# Patient Record
Sex: FEMALE | Race: WHITE | NOT HISPANIC OR LATINO | Employment: FULL TIME | URBAN - METROPOLITAN AREA
[De-identification: names, ages, dates, MRNs, and addresses within clinical notes are randomized per-mention and may not be internally consistent; named-entity substitution may affect disease eponyms.]

---

## 2017-01-29 ENCOUNTER — COMMUNICATION - HEALTHEAST (OUTPATIENT)
Dept: FAMILY MEDICINE | Facility: CLINIC | Age: 27
End: 2017-01-29

## 2017-01-29 DIAGNOSIS — G89.29 CHRONIC BACK PAIN: ICD-10-CM

## 2017-01-29 DIAGNOSIS — M54.9 CHRONIC BACK PAIN: ICD-10-CM

## 2017-02-03 ENCOUNTER — COMMUNICATION - HEALTHEAST (OUTPATIENT)
Dept: FAMILY MEDICINE | Facility: CLINIC | Age: 27
End: 2017-02-03

## 2017-02-13 ENCOUNTER — COMMUNICATION - HEALTHEAST (OUTPATIENT)
Dept: FAMILY MEDICINE | Facility: CLINIC | Age: 27
End: 2017-02-13

## 2017-02-17 ENCOUNTER — RECORDS - HEALTHEAST (OUTPATIENT)
Dept: GENERAL RADIOLOGY | Facility: CLINIC | Age: 27
End: 2017-02-17

## 2017-02-17 ENCOUNTER — OFFICE VISIT - HEALTHEAST (OUTPATIENT)
Dept: FAMILY MEDICINE | Facility: CLINIC | Age: 27
End: 2017-02-17

## 2017-02-17 DIAGNOSIS — M41.9 SCOLIOSIS OF THORACOLUMBAR SPINE: ICD-10-CM

## 2017-02-17 DIAGNOSIS — M54.9 BACK PAIN: ICD-10-CM

## 2017-02-17 DIAGNOSIS — M54.9 DORSALGIA, UNSPECIFIED: ICD-10-CM

## 2017-02-21 ENCOUNTER — OFFICE VISIT - HEALTHEAST (OUTPATIENT)
Dept: FAMILY MEDICINE | Facility: CLINIC | Age: 27
End: 2017-02-21

## 2017-02-21 DIAGNOSIS — R41.840 ATTENTION AND CONCENTRATION DEFICIT: ICD-10-CM

## 2017-02-21 DIAGNOSIS — F33.0 MILD EPISODE OF RECURRENT MAJOR DEPRESSIVE DISORDER (H): ICD-10-CM

## 2017-02-21 ASSESSMENT — MIFFLIN-ST. JEOR: SCORE: 1200.55

## 2017-02-21 NOTE — ASSESSMENT & PLAN NOTE
She tells me that Celexa, Lexapro and Cymbalta have all not worked for her.  I will order a psychology and psychiatry consultation since she has had some much trouble with medications.  She also has some attention issues for which they can evaluate her too.

## 2017-02-22 ENCOUNTER — COMMUNICATION - HEALTHEAST (OUTPATIENT)
Dept: FAMILY MEDICINE | Facility: CLINIC | Age: 27
End: 2017-02-22

## 2017-02-23 ENCOUNTER — COMMUNICATION - HEALTHEAST (OUTPATIENT)
Dept: FAMILY MEDICINE | Facility: CLINIC | Age: 27
End: 2017-02-23

## 2017-02-23 DIAGNOSIS — G89.29 CHRONIC BACK PAIN: ICD-10-CM

## 2017-02-23 DIAGNOSIS — M54.9 CHRONIC BACK PAIN: ICD-10-CM

## 2017-03-06 ENCOUNTER — COMMUNICATION - HEALTHEAST (OUTPATIENT)
Dept: FAMILY MEDICINE | Facility: CLINIC | Age: 27
End: 2017-03-06

## 2017-03-06 DIAGNOSIS — M54.50 CHRONIC BILATERAL LOW BACK PAIN WITHOUT SCIATICA: ICD-10-CM

## 2017-03-06 DIAGNOSIS — G89.29 CHRONIC BILATERAL LOW BACK PAIN WITHOUT SCIATICA: ICD-10-CM

## 2017-03-20 ENCOUNTER — COMMUNICATION - HEALTHEAST (OUTPATIENT)
Dept: FAMILY MEDICINE | Facility: CLINIC | Age: 27
End: 2017-03-20

## 2017-04-11 ENCOUNTER — COMMUNICATION - HEALTHEAST (OUTPATIENT)
Dept: SCHEDULING | Facility: CLINIC | Age: 27
End: 2017-04-11

## 2018-02-08 ENCOUNTER — OFFICE VISIT - HEALTHEAST (OUTPATIENT)
Dept: FAMILY MEDICINE | Facility: CLINIC | Age: 28
End: 2018-02-08

## 2018-02-08 DIAGNOSIS — F11.90 CHRONIC, CONTINUOUS USE OF OPIOIDS: ICD-10-CM

## 2018-02-08 DIAGNOSIS — M41.9 SCOLIOSIS OF THORACOLUMBAR SPINE, UNSPECIFIED SCOLIOSIS TYPE: ICD-10-CM

## 2018-02-08 RX ORDER — MONTELUKAST SODIUM 10 MG/1
10 TABLET ORAL PRN
Qty: 90 TABLET | Refills: 3 | Status: SHIPPED | OUTPATIENT
Start: 2018-02-08 | End: 2024-03-19

## 2018-02-08 ASSESSMENT — MIFFLIN-ST. JEOR: SCORE: 1156.55

## 2018-02-08 NOTE — ASSESSMENT & PLAN NOTE
With chronic pain.  Patient has had for quite some time.  Notes from when patient used to block the clinic were reviewed and there was no advancement of the scoliosis.  It has been stable.

## 2018-02-08 NOTE — ASSESSMENT & PLAN NOTE
Currently with pain contract Wilfredo Hawkins at [facility].  Discussed with patient that in order for us to establish care as wellas take over the pain contract, will need to get a drug screen prior, as well as retrieve the records from her current PCP for review.  Patient understands and she has been getting medication from her current PCP.  She will complete the test, NEISHA was completed,  was reviewed and noted that patient has been receiving 90 tabs approximately every 30 days from one provider and one pharmacy for the past year.    Also, patient is interested in weaning off and using different modalities for control of the pain.  She has not been to a pain clinic.  Consult was placed as well as packet given to complete to send back to the pain clinic for evaluation.

## 2018-07-11 ENCOUNTER — RECORDS - HEALTHEAST (OUTPATIENT)
Dept: LAB | Facility: CLINIC | Age: 28
End: 2018-07-11

## 2018-07-13 LAB
CODEINE-BY LC-MS/MS: NEGATIVE NG/ML
DIHYDROCODEINE-BY LC-MS/MS: 235 NG/ML
HYDROCODONE-BY LC-MS/MS: 1150 NG/ML
HYDROMORPHONE-BY LC MS/MS: 643 NG/ML
MORPHINE-BY LC MS/MS: NEGATIVE NG/ML
NALOXONE-BY LC-MS/MS: NEGATIVE NG/ML
NORHYDROCODONE-BY LC-MS/MS: 1842 NG/ML
NOROXYCODONE-BY LC-MS/MS: NEGATIVE NG/ML
NOROXYMORPHONE-BY LC-MS/MS: NEGATIVE NG/ML
OXYCODONE-BY LC MS/MS: NEGATIVE NG/ML
OXYMORPHONE-BY LC MS/MS: 32 NG/ML
TOXICOLOGIST REVIEW: NORMAL

## 2018-07-15 LAB
AMPHETAMINE GC/MS CONF: 32 NG/ML
INTERPRETATION: NORMAL
MDA (ECSTASY METABOLITE)-BY GC/MS: NEGATIVE NG/ML
MDMA (ECSTASY)-BY GC/MS: NEGATIVE NG/ML
METHAMPHETAMINE GC/MS CONF: NEGATIVE NG/ML
PHENTERMINE-BY GC/MS: NEGATIVE NG/ML
PSEUDOEPHEDRINE/EPHEDRINE-BY GC/MS: PRESENT NG/ML

## 2018-10-03 ENCOUNTER — AMBULATORY - HEALTHEAST (OUTPATIENT)
Dept: NURSING | Facility: CLINIC | Age: 28
End: 2018-10-03

## 2019-04-25 ENCOUNTER — AMBULATORY - HEALTHEAST (OUTPATIENT)
Dept: PALLIATIVE MEDICINE | Facility: OTHER | Age: 29
End: 2019-04-25

## 2019-04-25 DIAGNOSIS — G89.4 CHRONIC PAIN SYNDROME: ICD-10-CM

## 2019-05-16 ENCOUNTER — OFFICE VISIT - HEALTHEAST (OUTPATIENT)
Dept: FAMILY MEDICINE | Facility: CLINIC | Age: 29
End: 2019-05-16

## 2019-05-16 DIAGNOSIS — N30.00 ACUTE CYSTITIS WITHOUT HEMATURIA: ICD-10-CM

## 2019-05-16 LAB
ALBUMIN UR-MCNC: ABNORMAL MG/DL
APPEARANCE UR: ABNORMAL
BACTERIA #/AREA URNS HPF: ABNORMAL HPF
BILIRUB UR QL STRIP: ABNORMAL
COLOR UR AUTO: YELLOW
GLUCOSE UR STRIP-MCNC: NEGATIVE MG/DL
HGB UR QL STRIP: ABNORMAL
KETONES UR STRIP-MCNC: ABNORMAL MG/DL
LEUKOCYTE ESTERASE UR QL STRIP: ABNORMAL
NITRATE UR QL: NEGATIVE
PH UR STRIP: 6 [PH] (ref 5–8)
RBC #/AREA URNS AUTO: ABNORMAL HPF
SP GR UR STRIP: >1.03 (ref 1–1.03)
SQUAMOUS #/AREA URNS AUTO: ABNORMAL LPF
TRANS CELLS #/AREA URNS HPF: ABNORMAL LPF
UROBILINOGEN UR STRIP-ACNC: ABNORMAL
WBC #/AREA URNS AUTO: ABNORMAL HPF
WBC CLUMPS #/AREA URNS HPF: PRESENT /[HPF]

## 2019-05-16 RX ORDER — DEXTROAMPHETAMINE SACCHARATE, AMPHETAMINE ASPARTATE, DEXTROAMPHETAMINE SULFATE AND AMPHETAMINE SULFATE 2.5; 2.5; 2.5; 2.5 MG/1; MG/1; MG/1; MG/1
10 TABLET ORAL DAILY
Status: SHIPPED | COMMUNITY
Start: 2019-05-16 | End: 2024-03-19

## 2019-05-16 RX ORDER — DEXTROAMPHETAMINE SACCHARATE, AMPHETAMINE ASPARTATE MONOHYDRATE, DEXTROAMPHETAMINE SULFATE AND AMPHETAMINE SULFATE 7.5; 7.5; 7.5; 7.5 MG/1; MG/1; MG/1; MG/1
30 CAPSULE, EXTENDED RELEASE ORAL DAILY
Status: SHIPPED | COMMUNITY
Start: 2019-05-16 | End: 2024-03-19

## 2019-05-16 RX ORDER — METHADONE HYDROCHLORIDE 10 MG/1
100 TABLET ORAL DAILY
Status: SHIPPED | COMMUNITY
Start: 2019-05-16 | End: 2024-03-19

## 2019-05-16 RX ORDER — CITALOPRAM HYDROBROMIDE 10 MG/1
10 TABLET ORAL DAILY
Status: SHIPPED | COMMUNITY
Start: 2019-05-16 | End: 2024-03-19

## 2019-05-16 ASSESSMENT — MIFFLIN-ST. JEOR: SCORE: 1207.36

## 2019-05-17 ENCOUNTER — COMMUNICATION - HEALTHEAST (OUTPATIENT)
Dept: FAMILY MEDICINE | Facility: CLINIC | Age: 29
End: 2019-05-17

## 2019-05-17 LAB — BACTERIA SPEC CULT: NO GROWTH

## 2019-07-26 ENCOUNTER — RECORDS - HEALTHEAST (OUTPATIENT)
Dept: LAB | Facility: CLINIC | Age: 29
End: 2019-07-26

## 2019-07-31 LAB
AMPHET UR-MCNC: >5000 NG/ML
MDA UR-MCNC: <200 NG/ML
MDEA UR-MCNC: <200 NG/ML
MDMA UR-MCNC: <200 NG/ML
METHAMPHET UR-MCNC: <200 NG/ML
PHENTERMINE UR CFM-MCNC: <200 NG/ML

## 2019-11-27 ENCOUNTER — RECORDS - HEALTHEAST (OUTPATIENT)
Dept: LAB | Facility: CLINIC | Age: 29
End: 2019-11-27

## 2019-11-27 LAB
ALBUMIN SERPL-MCNC: 3.9 G/DL (ref 3.5–5)
ALP SERPL-CCNC: 80 U/L (ref 45–120)
ALT SERPL W P-5'-P-CCNC: 19 U/L (ref 0–45)
ANION GAP SERPL CALCULATED.3IONS-SCNC: 8 MMOL/L (ref 5–18)
AST SERPL W P-5'-P-CCNC: 20 U/L (ref 0–40)
BILIRUB SERPL-MCNC: 0.7 MG/DL (ref 0–1)
BUN SERPL-MCNC: 12 MG/DL (ref 8–22)
CALCIUM SERPL-MCNC: 9 MG/DL (ref 8.5–10.5)
CHLORIDE BLD-SCNC: 105 MMOL/L (ref 98–107)
CHOLEST SERPL-MCNC: 165 MG/DL
CO2 SERPL-SCNC: 27 MMOL/L (ref 22–31)
CREAT SERPL-MCNC: 0.73 MG/DL (ref 0.6–1.1)
FASTING STATUS PATIENT QL REPORTED: NORMAL
GFR SERPL CREATININE-BSD FRML MDRD: >60 ML/MIN/1.73M2
GLUCOSE BLD-MCNC: 87 MG/DL (ref 70–125)
HDLC SERPL-MCNC: 50 MG/DL
LDLC SERPL CALC-MCNC: 92 MG/DL
POTASSIUM BLD-SCNC: 3.9 MMOL/L (ref 3.5–5)
PROT SERPL-MCNC: 6.6 G/DL (ref 6–8)
SODIUM SERPL-SCNC: 140 MMOL/L (ref 136–145)
T4 FREE SERPL-MCNC: 0.8 NG/DL (ref 0.7–1.8)
TRIGL SERPL-MCNC: 114 MG/DL
TSH SERPL DL<=0.005 MIU/L-ACNC: 0.5 UIU/ML (ref 0.3–5)

## 2021-05-28 ENCOUNTER — RECORDS - HEALTHEAST (OUTPATIENT)
Dept: ADMINISTRATIVE | Facility: CLINIC | Age: 31
End: 2021-05-28

## 2021-05-28 NOTE — TELEPHONE ENCOUNTER
----- Message from Sang Redding MD sent at 5/17/2019  9:12 AM CDT -----  Please call patient and let him/her know the urine culture did not show any bacterial growth.  The symptoms are not related to a bacterial bladder infection.  I recommend that she drink fluids.  She can consider returning to clinic next week if her symptoms have continued.    Thank you  Sang

## 2021-05-28 NOTE — PROGRESS NOTES
Assessment/Plan:        Diagnoses and all orders for this visit:    Acute cystitis without hematuria: Symptoms and urinalysis are consistent with bladder infection.  Urine culture will be obtained to guide completion of therapy.  -     Urinalysis-UC if Indicated  -     nitrofurantoin, macrocrystal-monohydrate, (MACROBID) 100 MG capsule; Take 1 capsule (100 mg total) by mouth 2 (two) times a day for 5 days.  Dispense: 10 capsule; Refill: 0    Other orders  -     dextroamphetamine-amphetamine (ADDERALL XR) 30 MG 24 hr capsule; Take 30 mg by mouth daily.  -     dextroamphetamine-amphetamine (ADDERALL) 10 mg Tab tablet; Take 10 mg by mouth daily.  -     methadone (DOLOPHINE) 10 MG tablet; Take 100 mg by mouth daily.  -     citalopram (CELEXA) 10 MG tablet; Take 10 mg by mouth daily.          Subjective:    Patient ID: Savi Kramer is a 29 y.o. female.    Dysuria    This is a new problem. The current episode started 1 to 4 weeks ago. The problem occurs every urination. The problem has been gradually worsening. The quality of the pain is described as burning. The pain is moderate. There has been no fever. She is not sexually active. There is no history of pyelonephritis. Associated symptoms include frequency, hematuria and urgency. She has tried nothing for the symptoms. Her past medical history is significant for recurrent UTIs. There is no history of catheterization.       The following portions of the patient's history were reviewed and updated as appropriate: allergies, current medications, past medical history and problem list.    Review of Systems   Constitutional: Negative.    HENT: Negative.    Eyes: Negative.    Respiratory: Negative.    Cardiovascular: Negative.    Gastrointestinal: Negative.    Genitourinary: Positive for dysuria, frequency, hematuria and urgency.   Musculoskeletal: Negative.    Skin: Negative.    Neurological: Negative.              Objective:    Physical Exam   Constitutional: She appears  well-developed. No distress.   Eyes: No scleral icterus.   Cardiovascular: Normal rate and regular rhythm. Exam reveals no gallop and no friction rub.   No murmur heard.  Pulmonary/Chest: Effort normal and breath sounds normal. She has no wheezes. She has no rales.   Abdominal: Soft. Bowel sounds are normal. There is no tenderness. There is no rebound.   No CVA tenderness.   Musculoskeletal: She exhibits no edema.   Vitals reviewed.

## 2021-05-30 VITALS — WEIGHT: 111 LBS | HEIGHT: 64 IN | BODY MASS INDEX: 18.95 KG/M2

## 2021-05-30 VITALS — WEIGHT: 114.9 LBS

## 2021-05-31 ENCOUNTER — RECORDS - HEALTHEAST (OUTPATIENT)
Dept: ADMINISTRATIVE | Facility: CLINIC | Age: 31
End: 2021-05-31

## 2021-05-31 VITALS — WEIGHT: 101.3 LBS | HEIGHT: 64 IN | BODY MASS INDEX: 17.29 KG/M2

## 2021-06-02 VITALS — BODY MASS INDEX: 18.16 KG/M2 | HEIGHT: 65 IN | WEIGHT: 109 LBS

## 2021-06-08 NOTE — PROGRESS NOTES
Assessment:     1. Back pain  XR Lumbar Spine 2 or 3 VWS    XR Thoracic Spine 2 VWS   2. Scoliosis of thoracolumbar spine         Plan:     I recommended a referral to the [facility] for a formal consultation. I also discussed the use of pain medications. The patient would like to continue to try Tramadol, but will try to limit it to 2 daily.     Subjective:       26 y.o. female presents for evaluation of ongoing back pain. The patient has had this patient for the past about 5 years. She describes pain in her lower and mid back. She denies any radiation of the pain. She participated in physical therapy and had some good response with decreased pain, however, the pain has since gradually returned. She has been prescribed Tramadol for her pain as OTC pain medications were ineffective. She was advised to take this PRN initially, but found that taking 2 tablets twice daily helped with her pain. However, she started noticing that she felt nauseated the next morning. She stopped the Tramadol in the past week and the nausea has resolved. However, she is still having quite a bit of pain. She feels a lot of pain in the morning and then again later in the day.     The following portions of the patient's history were reviewed and updated as appropriate: allergies, current medications, past family history, past medical history, past social history, past surgical history and problem list.    Review of Systems  Pertinent items are noted in HPI.     History   Smoking Status     Current Every Day Smoker   Smokeless Tobacco     Never Used     Comment: 6 per day       Objective:        Visit Vitals     /88 (Patient Site: Left Arm, Patient Position: Sitting, Cuff Size: Adult Regular)     Pulse (!) 108     Temp 98.1  F (36.7  C) (Oral)     Resp 16     Wt 114 lb 14.4 oz (52.1 kg)     LMP 01/21/2017     General appearance: alert, appears stated age and cooperative  Back: mild tenderness over lower back with a curve noted in  the lower lumbar region     Xray thoracic and lumbar spine reviewed with patient and rather moderate curve of lumbar spine noted but with good preservation of joint spaces and no degenerative changes seen.     This note has been dictated using voice recognition software. Any grammatical or context distortions are unintentional and inherent to the software

## 2021-06-09 NOTE — PROGRESS NOTES
ASSESSMENT AND PLAN:  Problem List Items Addressed This Visit     Major Depression, Recurrent - Primary     She tells me that Celexa, Lexapro and Cymbalta have all not worked for her.  I will order a psychology and psychiatry consultation since she has had some much trouble with medications.  She also has some attention issues for which they can evaluate her too.         Relevant Orders    Ambulatory referral to Psychology    Ambulatory referral to Psychiatry    Attention and concentration deficit     She tells me that Celexa, Lexapro and Cymbalta have all not worked for her.  I will order a psychology and psychiatry consultation since she has had some much trouble with medications.  She also has some attention issues for which they can evaluate her too.           Relevant Orders    Ambulatory referral to Psychology    Ambulatory referral to Psychiatry         The following are part of a depression follow up plan for the patient: under care of mental health counselor      Chief Complaint   Patient presents with     ADHD     Patient was diagnosed with ADHD as a teenager, and now is struggling with these symptoms again.      HPI Comments: Savi Kramer is a 26 y.o. female comes in for concern that she might have attention deficit disorder.  She says that she was told that she might have this when she was 15 and went to [facility] in [place], but they never treated her and she is not sure they did a full evaluation.  She has been googling sx on web MD.     She also mentions that she has had several ssri meds that were not effective.  Celexa, lexapro and cymbalta.     She wonders what can be done.   She has three kids and is very busy as she is a single mom.   She states her brother has attention issues too.     She also mentions she smokes, but declined wanting to discuss quitting right now.    History   Smoking Status     Current Every Day Smoker   Smokeless Tobacco     Never Used     Comment: 6 per day  "     Current Outpatient Prescriptions   Medication Sig Dispense Refill     ibuprofen (ADVIL,MOTRIN) 800 MG tablet Take 1 tablet (800 mg total) by mouth every 8 (eight) hours as needed for pain. 60 tablet 0     No current facility-administered medications for this visit.      No Known Allergies  Review of Systems - 10 point ros is negative, except as noted above.       OBJECTIVE:   Visit Vitals     /70     Pulse (!) 120     Resp 16     Ht 5' 3.5\" (1.613 m)     Wt 111 lb (50.3 kg)     LMP 02/21/2017 (Exact Date)     Breastfeeding No     BMI 19.35 kg/m2     General: friendly 26 y.o. female.   Cv: regular rate and rhythm  Lungs: normal respiratory effort.   Psych: alert ,fluent    Little interest or pleasure in doing things: Several days  Feeling down, depressed, or hopeless: More than half the days  Trouble falling or staying asleep, or sleeping too much: Not at all  Feeling tired or having little energy: Several days  Poor appetite or overeating: Not at all  Feeling bad about yourself - or that you are a failure or have let yourself or your family down: Several days  Trouble concentrating on things, such as reading the newspaper or watching television: Nearly every day  Moving or speaking so slowly that other people could have noticed. Or the opposite - being so fidgety or restless that you have been moving around a lot more than usual: More than half the days  Thoughts that you would be better off dead, or of hurting yourself in some way: Not at all  PHQ-9 Total Score: 10         "

## 2021-06-15 PROBLEM — M41.9 SCOLIOSIS OF THORACOLUMBAR SPINE: Status: ACTIVE | Noted: 2017-02-22

## 2021-06-15 PROBLEM — R41.840 ATTENTION AND CONCENTRATION DEFICIT: Status: ACTIVE | Noted: 2017-02-21

## 2021-06-15 NOTE — PROGRESS NOTES
Assessment:     1. Scoliosis of thoracolumbar spine, unspecified scoliosis type  Drug Abuse 1+, Urine    Ambulatory referral to Pain Clinic   2. Chronic, continuous use of opioids  Drug Abuse 1+, Urine    Ambulatory referral to Pain Clinic       Return in about 2 weeks (around 2/22/2018) for Establish care.    Plan:     Headaches, sinus pressure  Patient has been receiving Singulair for the past year and it is actually improved the sinus pressure significantly.  She is low on the medication and requesting a refill.  Refill was sent to the pharmacy on file.    Scoliosis of thoracolumbar spine  With chronic pain.  Patient has had for quite some time.  Notes from when patient used to block the clinic were reviewed and there was no advancement of the scoliosis.  It has been stable.    Chronic, continuous use of opioids  Currently with pain contract through Dr. Hawkins at [facility].  Discussed with patient that in order for us to establish care as well as take over the pain contract, will need to get a drug screen prior, as well as retrieve the records from her current PCP for review.  Patient understands and she has been getting medication from her current PCP.  She will complete the test, NEISHA was completed,  was reviewed and noted that patient has been receiving 90 tabs approximately every 30 days from one provider and one pharmacy for the past year.      The following low BMI interventions were performed this visit: nutrition/feeding management    Subjective:       27 y.o. female presents for evaluation chronic pain, sinus pressure that is chronic, and possibly establish care.  Patient was under the care of this clinic just over a year ago.  At that time she chose to move to a different clinic for her primary care.  At the time she left this clinic she was taking tramadol for her chronic pain and attempting to bring it down from 3 times a day to 2 tabs per day.  She comes to the office today with her to mail  "toddler's.  Patient I were able to hold conversation, but it is noted that she was distracted on and off to the visit secondary to the children that did not want to sit still.  Her pain she states has been from her scoliosis.  It is constant in nature.  She denies any illicit drug use.  She does state she was come off the Vicodin as she states she has read about it and finds that is kind of a scary drug.  She has used ibuprofen in the past however it does not seem to keep the pain at a level that she can fully manage.  Denies any homicidal or suicidal ideation.  Denies any radiation of pain in the back.  It is noted that at the time she decided to depart from clinic, there was a pending order/referral for the spine clinic that was closed after the patient decided to change primary care providers.  Her chronic sinus pain/headaches have been around for a few years.  She has worked with ENT to try and minimize.  They found with the use of Singulair and actually brings the pressure down and decreases the number of infections.    The following portions of the patient's history were reviewed and updated as appropriate: allergies, current medications, past family history, past medical history, past social history, past surgical history and problem list.    Review of Systems  A 12 point comprehensive review of systems was negative except as noted.     History   Smoking Status     Current Every Day Smoker     Types: Cigarettes   Smokeless Tobacco     Never Used     Comment: 6 per day         Objective:      /76 (Patient Site: Left Arm, Patient Position: Sitting, Cuff Size: Adult Large)  Pulse 68  Temp 98.1  F (36.7  C) (Oral)   Resp 12  Ht 5' 3.5\" (1.613 m)  Wt 101 lb 4.8 oz (45.9 kg)  LMP 12/13/2017 (Exact Date)  Breastfeeding? No  BMI 17.66 kg/m2  General appearance: alert, appears stated age and cooperative  Head: Normocephalic, without obvious abnormality, atraumatic  Neurologic: Mental status: Alert, " oriented, thought content appropriate       This note has been dictated using voice recognition software. Any grammatical or context distortions are unintentional and inherent to the software

## 2021-06-16 PROBLEM — F11.90 CHRONIC, CONTINUOUS USE OF OPIOIDS: Status: ACTIVE | Noted: 2018-02-08

## 2021-07-03 NOTE — ADDENDUM NOTE
Addendum Note by Alex Samayoa RT (R) at 2/8/2018  5:20 PM     Author: Alxe Samayoa RT (R) Service: -- Author Type: Radiologic Technologist    Filed: 2/8/2018  5:20 PM Encounter Date: 2/8/2018 Status: Signed    : Alex Samayoa RT (R) (Radiologic Technologist)    Addended by: ALEX CAVAZOS on: 2/8/2018 05:20 PM        Modules accepted: Orders

## 2021-08-21 ENCOUNTER — HEALTH MAINTENANCE LETTER (OUTPATIENT)
Age: 31
End: 2021-08-21

## 2021-10-16 ENCOUNTER — HEALTH MAINTENANCE LETTER (OUTPATIENT)
Age: 31
End: 2021-10-16

## 2022-09-25 ENCOUNTER — HEALTH MAINTENANCE LETTER (OUTPATIENT)
Age: 32
End: 2022-09-25

## 2023-10-02 ENCOUNTER — OFFICE VISIT (OUTPATIENT)
Dept: FAMILY MEDICINE | Facility: CLINIC | Age: 33
End: 2023-10-02
Payer: COMMERCIAL

## 2023-10-02 VITALS
OXYGEN SATURATION: 96 % | BODY MASS INDEX: 22.57 KG/M2 | TEMPERATURE: 98.4 F | DIASTOLIC BLOOD PRESSURE: 85 MMHG | HEART RATE: 78 BPM | HEIGHT: 63 IN | WEIGHT: 127.4 LBS | SYSTOLIC BLOOD PRESSURE: 125 MMHG | RESPIRATION RATE: 16 BRPM

## 2023-10-02 DIAGNOSIS — B00.9 RECURRENT HERPES SIMPLEX: Primary | ICD-10-CM

## 2023-10-02 PROCEDURE — 99203 OFFICE O/P NEW LOW 30 MIN: CPT

## 2023-10-02 RX ORDER — VALACYCLOVIR HYDROCHLORIDE 1 G/1
1000 TABLET, FILM COATED ORAL DAILY
Qty: 90 TABLET | Refills: 1 | Status: SHIPPED | OUTPATIENT
Start: 2023-10-02 | End: 2023-10-02

## 2023-10-02 RX ORDER — GABAPENTIN 800 MG/1
300 TABLET ORAL DAILY
COMMUNITY
Start: 2023-09-07

## 2023-10-02 RX ORDER — VALACYCLOVIR HYDROCHLORIDE 1 G/1
1000 TABLET, FILM COATED ORAL DAILY
Qty: 90 TABLET | Refills: 1 | Status: SHIPPED | OUTPATIENT
Start: 2023-10-02 | End: 2024-07-16

## 2023-10-02 RX ORDER — MIRTAZAPINE 45 MG/1
45 TABLET, FILM COATED ORAL AT BEDTIME
COMMUNITY
Start: 2022-07-01

## 2023-10-02 RX ORDER — VALACYCLOVIR HYDROCHLORIDE 1 G/1
1000 TABLET, FILM COATED ORAL DAILY
COMMUNITY
Start: 2023-09-14 | End: 2023-10-02

## 2023-10-02 RX ORDER — BUPRENORPHINE HYDROCHLORIDE, NALOXONE HYDROCHLORIDE 12; 3 MG/1; MG/1
1 FILM, SOLUBLE BUCCAL; SUBLINGUAL 2 TIMES DAILY
COMMUNITY

## 2023-10-02 RX ORDER — VALACYCLOVIR HYDROCHLORIDE 500 MG/1
500 TABLET, FILM COATED ORAL 2 TIMES DAILY
Qty: 6 TABLET | Refills: 0 | Status: SHIPPED | OUTPATIENT
Start: 2023-10-02 | End: 2024-03-19

## 2023-10-02 ASSESSMENT — ANXIETY QUESTIONNAIRES
2. NOT BEING ABLE TO STOP OR CONTROL WORRYING: MORE THAN HALF THE DAYS
6. BECOMING EASILY ANNOYED OR IRRITABLE: SEVERAL DAYS
7. FEELING AFRAID AS IF SOMETHING AWFUL MIGHT HAPPEN: SEVERAL DAYS
1. FEELING NERVOUS, ANXIOUS, OR ON EDGE: SEVERAL DAYS
5. BEING SO RESTLESS THAT IT IS HARD TO SIT STILL: SEVERAL DAYS
GAD7 TOTAL SCORE: 9
4. TROUBLE RELAXING: SEVERAL DAYS
3. WORRYING TOO MUCH ABOUT DIFFERENT THINGS: MORE THAN HALF THE DAYS
GAD7 TOTAL SCORE: 9
IF YOU CHECKED OFF ANY PROBLEMS ON THIS QUESTIONNAIRE, HOW DIFFICULT HAVE THESE PROBLEMS MADE IT FOR YOU TO DO YOUR WORK, TAKE CARE OF THINGS AT HOME, OR GET ALONG WITH OTHER PEOPLE: SOMEWHAT DIFFICULT

## 2023-10-02 ASSESSMENT — PATIENT HEALTH QUESTIONNAIRE - PHQ9
SUM OF ALL RESPONSES TO PHQ QUESTIONS 1-9: 6
10. IF YOU CHECKED OFF ANY PROBLEMS, HOW DIFFICULT HAVE THESE PROBLEMS MADE IT FOR YOU TO DO YOUR WORK, TAKE CARE OF THINGS AT HOME, OR GET ALONG WITH OTHER PEOPLE: SOMEWHAT DIFFICULT
SUM OF ALL RESPONSES TO PHQ QUESTIONS 1-9: 6

## 2023-10-02 NOTE — PROGRESS NOTES
Assessment & Plan   Problem List Items Addressed This Visit          Infectious/Inflammatory    Recurrent herpes simplex - Primary     With recurrent symptoms, seems reasonable to switch to a suppressive therapy. Will have patient complete burst therapy and then start 1,000mg daily for suppression. 90 day supply. Discussed other supportive cares including immunity health and symptom management. Instructed patient to follow up with PCP if still not having success; could consider swab at that time or referral to Infectious Disease. Patient expressed an understanding of and agreement with this plan. All questions were answered.         Relevant Medications    valACYclovir (VALTREX) 500 MG tablet    valACYclovir (VALTREX) 1000 mg tablet      KATHLEEN Bal CNP  [facility] JANNET Hou is a 33 year old, presenting for the following health issues:    Mouth Lesions (2 months)        10/2/2023     1:29 PM   Additional Questions   Roomed by ac   Accompanied by self       Recurrent cold sores  Has had her current sore for 2 months   Has done a burst therapy one week ago. Minimal improvement  Wonders about suppressive therapy    History of Present Illness       Reason for visit:  Cold sore  Symptom onset:  More than a month  Symptom intensity:  Moderate  Symptom progression:  Staying the same  Had these symptoms before:  Yes  Has tried/received treatment for these symptoms:  Yes  Previous treatment was successful:  Yes    She eats 0-1 servings of fruits and vegetables daily.She consumes 3 sweetened beverage(s) daily.She exercises with enough effort to increase her heart rate 10 to 19 minutes per day.  She exercises with enough effort to increase her heart rate 3 or less days per week.   She is taking medications regularly.     Review of Systems   HENT:  Positive for mouth sores.           Objective    /85 (BP Location: Left arm, Patient Position: Sitting, Cuff Size: Adult  "Regular)   Pulse 78   Temp 98.4  F (36.9  C) (Oral)   Resp 16   Ht 1.594 m (5' 2.75\")   Wt 57.8 kg (127 lb 6.4 oz)   SpO2 96%   BMI 22.75 kg/m    Body mass index is 22.75 kg/m .    Physical Exam  Vitals and nursing note reviewed.   Constitutional:       General: She is not in acute distress.     Appearance: Normal appearance.   Skin:     Comments: Ulcerated lesion to right sided lower lip with no drainage or evidence of cellulitis   Neurological:      Mental Status: She is alert.                  "

## 2023-10-02 NOTE — COMMUNITY RESOURCES LIST (ENGLISH)
10/02/2023   [facility] - Outpatient Clinics  N/A  For additional resource needs, please contact your health insurance member services or your primary care team.  Phone: [phone]   Email: N/A   Address: [address] [place] [address]   Hours: N/A        Financial Stability       Rent and mortgage payment assistance  1  [place] [facility] Distance: 0.66 miles      [address] [place] [address]  Language: English, Togolese  Hours: Mon 9:30 AM - 11:30 AM , Tue 1:30 PM - 7:30 PM , Thu 9:00 AM - 7:00 PM , Fri 9:30 AM - 11:30 AM   Phone: (736) 864-4833 Email: info@[employer]ang[employer] Website: http://[employer].org/     2  [facility] Distance: 5.18 miles      In-Person, Phone/[employer]   [address] [place] [address]  Language: English, Togolese  Hours: Mon - Thu 8:00 AM - 4:00 PM  Fees: Free   Phone: (759) 335-8928 Email: center@[facility] Website: https://www.saintandrews.Jangl SMS          Food and Nutrition       Food pantry  3  [place] [facility] - Food Shelf Distance: 0.66 miles      [facility]   [address] [place] [address]  Language: English, Togolese  Hours: Mon 9:30 AM - 11:30 AM , Wed 9:30 AM - 11:30 AM , Thu 1:30 PM - 6:30 PM , Fri 9:30 AM - 11:30 AM  Fees: Free   Phone: (940) 361-1952 Email: info@[employer] Website: http://[employer].org/     4  [facility] [place] [facility] Distance: 2.04 miles      In-Person, Pickup   [address] [place] [address]  Language: English  Hours: Mon - Thu 9:00 AM - 10:00 AM  Fees: Free   Phone: (429) 271-9385 Email: communication@"[employer].org Website: http://www.[facility].org     SNAP application assistance  5  [employer] [place] Distance: 13.86 miles      Phone/[employer]   [address] [place] [address]  Language: English, Hmong, Greenlandic, Venezuelan, Togolese  Hours: Mon - Fri 8:30 AM - 4:30 PM  Fees: Free   Phone: (617) 117-2994 Email:  helpline@[employer]All [employer] Home.org Website: https://www.hungersoToroleoions.org/programs/[place]-food-helpline/     6  [facility] - Economic Support - [place] Distance: 2.05 miles      In-Person, Phone/Virtual   [address] [place] [address]  Language: English  Hours: Mon - Fri 8:00 AM - 4:30 PM  Fees: Free   Phone: (705) 262-8357 Email: sam@[facility]. Website: https://www.[facility]./787/Economic-Support     Soup kitchen or free meals  7  [facility] Family [facility] - Thursday Night Community Meal Distance: 5.18 miles      In-Person   [address] [place] [address]  Language: English, Italian  Hours: Thu 6:00 PM - 7:00 PM  Fees: Free   Phone: (396) 918-2639 Email: center@[employer] Website: https://www.saintandrews.org     8  [facility] - Homeless Outreach Services Team - Food Assistance Distance: 5.2 miles      Pickup   [address] [place] [address]  Language: English  Hours: Mon - Thu 9:30 AM - 3:30 PM  Fees: Free   Phone: (428) 892-6416 Email: office@saintE[employer] Website: https://www.saintandrewsNominum/community-resource-center/          Important Numbers & Websites       [address].org  Poison Control   (233) 861-9780 [place]  Suicide and Crisis Lifeline   [address] [place], [place] [address]line.org  Childhelp National Child Abuse Hotline   437.777.1477 Childhelphotline.org  National Sexual Assault Hotline   (214) 752-3635 ([place]) Rainn.org  National Runaway Safeline   (722) 585-6299 (RUNAWAY) [facility] Shawanorunaway.org  Pregnancy & Postpartum Support [place]   Call/text 303-724-6325 Ppsupportmn.org  Substance Abuse National Helpline ([facility]   640-581-HELP (4178) Findtreatment.gov  Emergency Services   911

## 2023-10-02 NOTE — ASSESSMENT & PLAN NOTE
With recurrent symptoms, seems reasonable to switch to a suppressive therapy. Will have patient complete burst therapy and then start 1,000mg daily for suppression. 90 day supply. Discussed other supportive cares including immunity health and symptom management. Instructed patient to follow up with PCP if still not having success; could consider swab at that time or referral to Infectious Disease. Patient expressed an understanding of and agreement with this plan. All questions were answered.

## 2023-10-02 NOTE — COMMUNITY RESOURCES LIST (ENGLISH)
10/02/2023   [facility]  N/A  For questions about this resource list or additional care needs, please contact your primary care clinic or care manager.  Phone: [phone]   Email: N/A   Address: [address] [place] [address]   Hours: N/A        Financial Stability       Rent and mortgage payment assistance  1  [place] [facility] Distance: 0.66 miles      [address] [place] [address]  Language: English, Yemeni  Hours: Mon 9:30 AM - 11:30 AM , Tue 1:30 PM - 7:30 PM , Thu 9:00 AM - 7:00 PM , Fri 9:30 AM - 11:30 AM   Phone: (320) 702-2543 Email: info@[employer] Website: http://Fetch MD.MyWebGrocer/     2  [facility] Distance: 5.18 miles      In-Person, Phone/[address] [address]  Language: English, Yemeni  Hours: Mon - Thu 8:00 AM - 4:00 PM  Fees: Free   Phone: (390) 728-8127 Email: center@[facility] Website: https://www.saintandrews.MyWebGrocer          Food and Nutrition       Food pantry  3  [place] [facility] - Food Shelf Distance: 0.66 miles      [facility]   [address] [place] [address]  Language: English, Yemeni  Hours: Mon 9:30 AM - 11:30 AM , Wed 9:30 AM - 11:30 AM , Thu 1:30 PM - 6:30 PM , Fri 9:30 AM - 11:30 AM  Fees: Free   Phone: (641) 590-1830 Email: info@[employer].MyWebGrocer Website: http://[facility]ch/     4  [facility] Distance: 2.04 miles      In-Person, Pickup   [address] [place] [address]  Language: English  Hours: Mon - Thu 9:00 AM - 10:00 AM  Fees: Free   Phone: (567) 751-9171 Email: communication@[employer].org Website: http://www.[facility].org     SNAP application assistance  5  [facility] - Economic Support - [place] Distance: 2.05 miles      In-Person, Phone/Virtual   [address] [address] [address]  Language: English  Hours: Mon - Fri 8:00 AM - 4:30 PM  Fees: Free   Phone: (973)  249-4889 Email: sam@[facility]. Website: https://www.[facility]/787/Economic-Support     6  [facility] - Economic Support [facility] Distance: 9.57 miles      In-Person, Phone/Virtual   [address] [place] [address]  Language: English  Hours: Mon - Fri 8:00 AM - 4:30 PM  Fees: Free   Phone: (687) 253-5229 Email: sam@[facility] Website: https://www.[facility]/787/Economic-Support     Soup kitchen or free meals  7  [facility] Family long-term - Thursday Night Community Meal Distance: 5.18 miles      In-Person   [address] [place] [address]  Language: English, Yakut  Hours: Thu 6:00 PM - 7:00 PM  Fees: Free   Phone: (881) 318-4827 Email: center@[employer] Website: https://www.saintandrews.org     8  [facility] - Homeless Outreach Services Team - Food Assistance Distance: 5.2 miles      Pickup   [address] [place] [address]  Language: English  Hours: Mon - Thu 9:30 AM - 3:30 PM  Fees: Free   Phone: (538) 864-1868 Email: office@[facility] Website: https://www.s[facility]andrewsSupplyBetter/community-resource-center/          Important Numbers & Websites       Emergency Services   [address] [place] [address]  Poison Control   (722) 356-3881  Suicide Prevention Lifeline   (635) 636-2976 (TALK)  Child Abuse Hotline   (236) 224-9543 (4-A-Child)  Sexual Assault Hotline   (291) 173-8663 (HOPE)  National Runaway Safeline   (516) 893-7452 (RUNAWAY)  All-Options Talkline   (792) 279-3153  Substance Abuse Referral   (648) 776-3292 (HELP)

## 2023-10-14 ENCOUNTER — HEALTH MAINTENANCE LETTER (OUTPATIENT)
Age: 33
End: 2023-10-14

## 2023-10-17 ENCOUNTER — NURSE TRIAGE (OUTPATIENT)
Dept: NURSING | Facility: CLINIC | Age: 33
End: 2023-10-17
Payer: COMMERCIAL

## 2023-10-17 NOTE — TELEPHONE ENCOUNTER
"Pt calls to report no success with recent herpes simplex treatment from recent OV of 10/2/23.  States \"It's just worsening.\"  Pt actually wants the provider \"to just send something else over to the pharmacy.\"  However, per provider advice from OV chart notes of 10/2/23, pt was advised as follows:  Instructed patient to follow up with PCP if still not having success; could consider swab at that time or referral to Infectious Disease. Patient expressed an understanding of and agreement with this plan.     Explained pt will require a fresh evaluation per provider advice given and current status of \"worsening\".  Pt reports having an upcoming appt to establish care with [facility], however this appt is reportedly in two weeks.  Therefore advised pt to seek urgent care eval.  Pt reports knowing a conveniently located urgent care in [place].    Port[facility] Nurse Advisor     Reason for Disposition   Follow-up information-only call to recent contact, no triage required    Protocols used: Information Only Call - No Triage-A-OH    "

## 2024-02-29 ENCOUNTER — TELEPHONE (OUTPATIENT)
Dept: FAMILY MEDICINE | Facility: CLINIC | Age: 34
End: 2024-02-29
Payer: COMMERCIAL

## 2024-02-29 NOTE — TELEPHONE ENCOUNTER
Detailed vm left for patient notifying [facility] clinic does not have any availability today. She should present to urgent care or contact PCP office to check their availability. Encouraged patient to contact clinic back with questions/concerns.     Yung BLACKWOOD RN  [facility]

## 2024-02-29 NOTE — TELEPHONE ENCOUNTER
Reason for Call:  Appointment Request    Patient requesting this type of appt: Chronic Diease Management/Medication/Follow-Up    Requested provider:  Any provider    Reason patient unable to be scheduled: Not within requested timeframe    When does patient want to be seen/preferred time: Same day    Comments: Patient was seen two days ago at outside organization for chest congestion and has been experiencing new shortness of breath symptoms and and continued chest congestion. She has some concern with pneumonia or bronchitis and would like to schedule an appointment for today if possible. Triage denied.     Could we send this information to you in [facility] or would you prefer to receive a phone call?:   Patient would prefer a phone call   Okay to leave a detailed message?: Yes at Home number on file [phone] (home)    Call taken on 2/29/2024 at 8:07 AM by Velma Frey

## 2024-03-14 ENCOUNTER — NURSE TRIAGE (OUTPATIENT)
Dept: NURSING | Facility: CLINIC | Age: 34
End: 2024-03-14

## 2024-03-14 NOTE — TELEPHONE ENCOUNTER
Pt calling that he is short of breath for a couple weeks and heart burn and wheezing.  Pt states shortness of breath is off and on. Pt does not want to be seen in ED.  Pt states she has anxiety which this is not.  Pt wanting to be seen at [facility] for heartburn and on and off short of breath; pt is aware to go to ED for respiratory distress.  Kindra Avery RN  [facility] Nurse Advisor    Reason for Disposition   MILD longstanding difficulty breathing (e.g., speaks in phrases, SOB even at rest, pulse 100-120) and SAME as normal    Additional Information   Negative: SEVERE difficulty breathing (e.g., struggling for each breath, speaks in single words, pulse > 120)   Negative: Breathing stopped and hasn't returned   Negative: Choking on something   Negative: Bluish (or gray) lips or face   Negative: Difficult to awaken or acting confused (e.g., disoriented, slurred speech)   Negative: Passed out (i.e., fainted, collapsed and was not responding)   Negative: Wheezing started suddenly after medicine, an allergic food, or bee sting   Negative: Stridor (harsh sound while breathing in)   Negative: Slow, shallow and weak breathing   Negative: Sounds like a life-threatening emergency to the triager   Negative: Chest pain   Negative: Wheezing (high pitched whistling sound) and previous asthma attacks or use of asthma medicines   Negative: Difficulty breathing and within 14 days of COVID-19 Exposure   Negative: Difficulty breathing and only present when coughing   Negative: Difficulty breathing and only from stuffy nose   Negative: Difficulty breathing and only from stuffy nose or runny nose from common cold   Negative: MODERATE difficulty breathing (e.g., speaks in phrases, SOB even at rest, pulse 100-120) of new-onset or worse than normal   Negative: Oxygen level (e.g., pulse oximetry) 90% or lower   Negative: Wheezing can be heard across the room   Negative: Drooling or spitting out saliva (because can't swallow)    "Negative: Any history of prior \"blood clot\" in leg or lungs   Negative: Illness requiring prolonged bedrest in past month (e.g., immobilization, long hospital stay)   Negative: Hip or leg fracture (broken bone) in past month (or had cast on leg or ankle in past month)   Negative: Major surgery in the past month   Negative: Long-distance travel in past month (e.g., car, bus, train, plane; with trip lasting 6 or more hours)   Negative: Cancer treatment in past six months (or has cancer now)   Negative: Extra heartbeats, irregular heart beating, or heart is beating very fast (i.e., 'palpitations')   Negative: Fever > 103 F (39.4 C)   Negative: Fever > 101 F (38.3 C) and over 60 years of age   Negative: Fever > 100.0 F (37.8 C) and bedridden (e.g., nursing home patient, stroke, chronic illness, recovering from surgery)   Negative: Fever > 100.0 F (37.8 C) and diabetes mellitus or weak immune system (e.g., HIV positive, cancer chemo, splenectomy, organ transplant, chronic steroids)   Negative: Periods where breathing stops and then resumes normally and bedridden (e.g., nursing home patient, CVA)   Negative: Pregnant or postpartum (from 0 to 6 weeks after delivery)   Negative: Patient sounds very sick or weak to the triager   Negative: MILD difficulty breathing (e.g., minimal/no SOB at rest, SOB with walking, pulse < 100) of new-onset or worse than normal   Negative: Longstanding difficulty breathing (e.g., CHF, COPD, emphysema) and worse than normal   Negative: Longstanding difficulty breathing and not responding to usual therapy   Negative: Continuous (nonstop) coughing   Negative: Patient wants to be seen   Negative: Oxygen level (e.g., pulse oximetry) 91 to 94%   Negative: MODERATE longstanding difficulty breathing (e.g., speaks in phrases, SOB even at rest, pulse 100-120) and SAME as normal    Protocols used: Breathing Difficulty-A-OH    "

## 2024-03-19 ENCOUNTER — OFFICE VISIT (OUTPATIENT)
Dept: FAMILY MEDICINE | Facility: CLINIC | Age: 34
End: 2024-03-19
Payer: COMMERCIAL

## 2024-03-19 VITALS
WEIGHT: 126.1 LBS | HEART RATE: 88 BPM | OXYGEN SATURATION: 97 % | BODY MASS INDEX: 22.34 KG/M2 | SYSTOLIC BLOOD PRESSURE: 128 MMHG | DIASTOLIC BLOOD PRESSURE: 82 MMHG | TEMPERATURE: 98.2 F | HEIGHT: 63 IN | RESPIRATION RATE: 16 BRPM

## 2024-03-19 DIAGNOSIS — R10.13 EPIGASTRIC PAIN: Primary | ICD-10-CM

## 2024-03-19 DIAGNOSIS — F11.20: ICD-10-CM

## 2024-03-19 LAB
ALBUMIN SERPL BCG-MCNC: 4.2 G/DL (ref 3.5–5.2)
ALP SERPL-CCNC: 50 U/L (ref 40–150)
ALT SERPL W P-5'-P-CCNC: 20 U/L (ref 0–50)
ANION GAP SERPL CALCULATED.3IONS-SCNC: 9 MMOL/L (ref 7–15)
AST SERPL W P-5'-P-CCNC: 21 U/L (ref 0–45)
BILIRUB SERPL-MCNC: 0.2 MG/DL
BUN SERPL-MCNC: 13.7 MG/DL (ref 6–20)
CALCIUM SERPL-MCNC: 8.9 MG/DL (ref 8.6–10)
CHLORIDE SERPL-SCNC: 106 MMOL/L (ref 98–107)
CREAT SERPL-MCNC: 0.67 MG/DL (ref 0.51–0.95)
DEPRECATED HCO3 PLAS-SCNC: 26 MMOL/L (ref 22–29)
EGFRCR SERPLBLD CKD-EPI 2021: >90 ML/MIN/1.73M2
ERYTHROCYTE [DISTWIDTH] IN BLOOD BY AUTOMATED COUNT: 12.7 % (ref 10–15)
GLUCOSE SERPL-MCNC: 93 MG/DL (ref 70–99)
HCT VFR BLD AUTO: 38.8 % (ref 35–47)
HGB BLD-MCNC: 11.9 G/DL (ref 11.7–15.7)
MCH RBC QN AUTO: 25.2 PG (ref 26.5–33)
MCHC RBC AUTO-ENTMCNC: 30.7 G/DL (ref 31.5–36.5)
MCV RBC AUTO: 82 FL (ref 78–100)
PLATELET # BLD AUTO: 223 10E3/UL (ref 150–450)
POTASSIUM SERPL-SCNC: 4.1 MMOL/L (ref 3.4–5.3)
PROT SERPL-MCNC: 6.7 G/DL (ref 6.4–8.3)
RBC # BLD AUTO: 4.73 10E6/UL (ref 3.8–5.2)
SODIUM SERPL-SCNC: 141 MMOL/L (ref 135–145)
WBC # BLD AUTO: 3.1 10E3/UL (ref 4–11)

## 2024-03-19 PROCEDURE — 85027 COMPLETE CBC AUTOMATED: CPT | Performed by: FAMILY MEDICINE

## 2024-03-19 PROCEDURE — 99214 OFFICE O/P EST MOD 30 MIN: CPT | Performed by: FAMILY MEDICINE

## 2024-03-19 PROCEDURE — 36415 COLL VENOUS BLD VENIPUNCTURE: CPT | Performed by: FAMILY MEDICINE

## 2024-03-19 PROCEDURE — 80053 COMPREHEN METABOLIC PANEL: CPT | Performed by: FAMILY MEDICINE

## 2024-03-19 RX ORDER — CITALOPRAM HYDROBROMIDE 40 MG/1
1 TABLET ORAL
COMMUNITY
Start: 2024-03-04 | End: 2024-04-23

## 2024-03-19 RX ORDER — SUCRALFATE 1 G/1
1 TABLET ORAL 4 TIMES DAILY PRN
Qty: 60 TABLET | Refills: 0 | Status: SHIPPED | OUTPATIENT
Start: 2024-03-19 | End: 2024-04-12

## 2024-03-19 RX ORDER — OMEPRAZOLE 40 MG/1
40 CAPSULE, DELAYED RELEASE ORAL DAILY
Qty: 30 CAPSULE | Refills: 1 | Status: SHIPPED | OUTPATIENT
Start: 2024-03-19 | End: 2024-04-12

## 2024-03-19 ASSESSMENT — PATIENT HEALTH QUESTIONNAIRE - PHQ9
SUM OF ALL RESPONSES TO PHQ QUESTIONS 1-9: 20
10. IF YOU CHECKED OFF ANY PROBLEMS, HOW DIFFICULT HAVE THESE PROBLEMS MADE IT FOR YOU TO DO YOUR WORK, TAKE CARE OF THINGS AT HOME, OR GET ALONG WITH OTHER PEOPLE: EXTREMELY DIFFICULT
SUM OF ALL RESPONSES TO PHQ QUESTIONS 1-9: 20

## 2024-03-19 NOTE — PROGRESS NOTES
"  Assessment & Plan   Problem List Items Addressed This Visit       Abstinence syndrome on maintenance opioid agonist therapy, no symptoms (H)    Epigastric pain - Primary     Patient presents with epigastric pain.  Worse with certain types of food.  Progressively getting worse.  She associates this with decreased appetite and weight loss.  History of substance abuse although she has been on a stable dose of Suboxone as prescribed by her maintenance absence therapy team for the past 40 days.  Exam is reassuring.  We will treat with PPI and Carafate as though this is gastritis.  If she responds this therapy she will decrease the dose of these medicines and taper over the next few months.  Check H. pylori.  If she does not respond I also ordered upper endoscopy to evaluate structural changes.  Labs as ordered.  Anticipate mild microcytic anemia.         Relevant Medications    omeprazole (PRILOSEC) 40 MG DR capsule    sucralfate (CARAFATE) 1 GM tablet    Other Relevant Orders    Comprehensive metabolic panel (BMP + Alb, Alk Phos, ALT, AST, Total. Bili, TP)    CBC with platelets    Helicobacter pylori Antigen Stool    Adult GI  Referral - Procedure Only      Subjective   Savi is a 34 year old, presenting for the following health issues:  Abdominal Pain (Stomach and throat pain with burning sensation for more than a month )        3/19/2024     7:21 AM   Additional Questions   Roomed by WILFRID     History of addiction.    Pills and then suboxone.  Now with pain in her stomach.  Appetite affected.   \"Something is going on.\"  No dark or bloody stools.       History of Present Illness       Reason for visit:  Stomach and throat pain  Symptom onset:  More than a month    She eats 0-1 servings of fruits and vegetables daily.She consumes 1 sweetened beverage(s) daily.She exercises with enough effort to increase her heart rate 9 or less minutes per day.  She exercises with enough effort to increase her heart rate 3 or " "less days per week.   She is taking medications regularly.        Objective    /82 (BP Location: Left arm, Patient Position: Sitting, Cuff Size: Adult Regular)   Pulse 88   Temp 98.2  F (36.8  C) (Oral)   Resp 16   Ht 1.594 m (5' 2.75\")   Wt 57.2 kg (126 lb 1.6 oz)   LMP  (LMP Unknown)   SpO2 97%   BMI 22.52 kg/m    Body mass index is 22.52 kg/m .  Physical Exam  Nursing note reviewed.   Constitutional:       General: She is not in acute distress.     Appearance: Normal appearance. She is not ill-appearing.   HENT:      Head: Normocephalic and atraumatic.   Eyes:      Extraocular Movements: Extraocular movements intact.      Conjunctiva/sclera: Conjunctivae normal.   Pulmonary:      Effort: Pulmonary effort is normal.   Abdominal:      General: There is no distension.      Palpations: Abdomen is soft. There is no mass.      Tenderness: There is abdominal tenderness. There is no guarding.   Neurological:      Mental Status: She is alert and oriented to person, place, and time.   Psychiatric:         Attention and Perception: Attention normal.         Mood and Affect: Mood normal.         Speech: Speech normal.         Thought Content: Thought content normal.           Signed Electronically by: Sang Redding MD    "

## 2024-03-19 NOTE — ASSESSMENT & PLAN NOTE
Patient presents with epigastric pain.  Worse with certain types of food.  Progressively getting worse.  She associates this with decreased appetite and weight loss.  History of substance abuse although she has been on a stable dose of Suboxone as prescribed by her maintenance absence therapy team for the past 40 days.  Exam is reassuring.  We will treat with PPI and Carafate as though this is gastritis.  If she responds this therapy she will decrease the dose of these medicines and taper over the next few months.  Check H. pylori.  If she does not respond I also ordered upper endoscopy to evaluate structural changes.  Labs as ordered.  Anticipate mild microcytic anemia.

## 2024-04-10 PROCEDURE — 87338 HPYLORI STOOL AG IA: CPT | Performed by: FAMILY MEDICINE

## 2024-04-11 ENCOUNTER — TELEPHONE (OUTPATIENT)
Dept: FAMILY MEDICINE | Facility: CLINIC | Age: 34
End: 2024-04-11
Payer: COMMERCIAL

## 2024-04-11 ENCOUNTER — MYC MEDICAL ADVICE (OUTPATIENT)
Dept: FAMILY MEDICINE | Facility: CLINIC | Age: 34
End: 2024-04-11
Payer: COMMERCIAL

## 2024-04-11 DIAGNOSIS — A04.8 H. PYLORI INFECTION: Primary | ICD-10-CM

## 2024-04-11 LAB — H PYLORI AG STL QL IA: POSITIVE

## 2024-04-11 RX ORDER — DOXYCYCLINE 100 MG/1
100 CAPSULE ORAL 2 TIMES DAILY
Qty: 28 CAPSULE | Refills: 0 | Status: SHIPPED | OUTPATIENT
Start: 2024-04-11 | End: 2024-04-25

## 2024-04-11 RX ORDER — BISMUTH SUBSALICYLATE 262 MG/1
1 TABLET, CHEWABLE ORAL
Qty: 56 TABLET | Refills: 0 | Status: SHIPPED | OUTPATIENT
Start: 2024-04-11 | End: 2024-04-19

## 2024-04-11 RX ORDER — BISMUTH SUBCITRATE POTASSIUM, METRONIDAZOLE, TETRACYCLINE HYDROCHLORIDE 140; 125; 125 MG/1; MG/1; MG/1
3 CAPSULE ORAL
Qty: 168 CAPSULE | Refills: 0 | Status: SHIPPED | OUTPATIENT
Start: 2024-04-11 | End: 2024-04-11

## 2024-04-11 RX ORDER — METRONIDAZOLE 500 MG/1
500 TABLET ORAL 3 TIMES DAILY
Qty: 42 TABLET | Refills: 0 | Status: SHIPPED | OUTPATIENT
Start: 2024-04-11 | End: 2024-04-25

## 2024-04-11 NOTE — TELEPHONE ENCOUNTER
Incoming call from [facility] pharmacy stating that they Pylera is not covered by patient's insurance and patient said she needs it as soon as possible. No time for PA , requesting alternative send to the pharmacy. Please advise

## 2024-04-12 ENCOUNTER — VIRTUAL VISIT (OUTPATIENT)
Dept: FAMILY MEDICINE | Facility: CLINIC | Age: 34
End: 2024-04-12
Payer: COMMERCIAL

## 2024-04-12 DIAGNOSIS — F33.9 EPISODE OF RECURRENT MAJOR DEPRESSIVE DISORDER, UNSPECIFIED DEPRESSION EPISODE SEVERITY (H): ICD-10-CM

## 2024-04-12 DIAGNOSIS — A04.8 H. PYLORI INFECTION: Primary | ICD-10-CM

## 2024-04-12 PROCEDURE — 99214 OFFICE O/P EST MOD 30 MIN: CPT | Mod: 95 | Performed by: FAMILY MEDICINE

## 2024-04-12 RX ORDER — CITALOPRAM HYDROBROMIDE 20 MG/1
30 TABLET ORAL DAILY
Qty: 45 TABLET | Refills: 0 | Status: SHIPPED | OUTPATIENT
Start: 2024-04-12 | End: 2024-05-05

## 2024-04-12 NOTE — ASSESSMENT & PLAN NOTE
Ongoing abdominal pain.  She submitted stool sample which was positive for H. pylori.  Over the past couple days, during my absence, my partners sent in a therapy regimen including doxycycline (presumably tetracycline not available), metronidazole, bismuth and 20 mg twice daily omeprazole.  The patient was able to  this regimen although she has not started yet.  She is concerned about the serotonergic effects of omeprazole on her regimen for mental health which include citalopram, mirtazapine.  - She does need treatment for H. pylori.  - Reduce temporarily citalopram dosing from 40 mg down to 30 mg.  This is managed by her psychiatrist in general.  This will mitigate the serotonergic concern.  We did review that her plan does have multiple serotonergic medications.  - If not improving, contact clinic.

## 2024-04-12 NOTE — PROGRESS NOTES
Savi is a 34 year old who is being evaluated via a billable video visit.    How would you like to obtain your AVS? MyChart  If the video visit is dropped, the invitation should be resent by: Send to e-mail at: [email or web address]  Will anyone else be joining your video visit? No      Assessment & Plan   Problem List Items Addressed This Visit       H. pylori infection - Primary     Ongoing abdominal pain.  She submitted stool sample which was positive for H. pylori.  Over the past couple days, during my absence, my partners sent in a therapy regimen including doxycycline (presumably tetracycline not available), metronidazole, bismuth and 20 mg twice daily omeprazole.  The patient was able to  this regimen although she has not started yet.  She is concerned about the serotonergic effects of omeprazole on her regimen for mental health which include citalopram, mirtazapine.  - She does need treatment for H. pylori.  - Reduce temporarily citalopram dosing from 40 mg down to 30 mg.  This is managed by her psychiatrist in general.  This will mitigate the serotonergic concern.  We did review that her plan does have multiple serotonergic medications.  - If not improving, contact clinic.         Major Depression, Recurrent    Relevant Medications    citalopram (CELEXA) 20 MG tablet       Subjective   Savi is a 34 year old, presenting for the following health issues:  RECHECK (Follow-up )        4/12/2024     6:52 AM   Additional Questions   Roomed by gladys     Via the Health Maintenance questionnaire, the patient has reported the following services have been completed -Cervical Cancer Screening, this information has been sent to the abstraction team.  Video Start Time: 7:05 AM    History of Present Illness       Reason for visit:  Positive h pylori test questions    She eats 0-1 servings of fruits and vegetables daily.She consumes 0 sweetened beverage(s) daily.She exercises with enough effort to increase her heart  rate 9 or less minutes per day.  She exercises with enough effort to increase her heart rate 3 or less days per week.   She is taking medications regularly.         Objective           Vitals:  No vitals were obtained today due to virtual visit.    Physical Exam   GENERAL: alert and no distress  EYES: Eyes grossly normal to inspection.  No discharge or erythema, or obvious scleral/conjunctival abnormalities.  RESP: No audible wheeze, cough, or visible cyanosis.    SKIN: Visible skin clear. No significant rash, abnormal pigmentation or lesions.  NEURO: Cranial nerves grossly intact.  Mentation and speech appropriate for age.  PSYCH: Appropriate affect, tone, and pace of words        Video-Visit Details    Type of service:  Video Visit   Video End Time:7:23 AM  Originating Location (pt. Location): Home    Distant Location (provider location):  On-site  Platform used for Video Visit: Matilda  Signed Electronically by: Sang Redding MD

## 2024-04-12 NOTE — TELEPHONE ENCOUNTER
Patient requesting change in Pepto Bismol prescription from chewable tabs to suspension.      Rx prepped below for review/approval, if appropriate.        Lynda Paulson RN  [facility]

## 2024-04-15 ENCOUNTER — NURSE TRIAGE (OUTPATIENT)
Dept: FAMILY MEDICINE | Facility: CLINIC | Age: 34
End: 2024-04-15
Payer: COMMERCIAL

## 2024-04-15 DIAGNOSIS — R82.998 DARK BROWN-COLORED URINE: Primary | ICD-10-CM

## 2024-04-15 NOTE — TELEPHONE ENCOUNTER
Called patient and relayed message from Dr. Redding as written below.  Patient appreciative of call back and agreeable to having labs done as recommended.  Patient scheduled for lab only appointment 4/16/24 at 9am, for recommended/ordered labs.  Patient had no further questions/concerns.      Lynda Paulson RN  [facility]

## 2024-04-15 NOTE — TELEPHONE ENCOUNTER
"Patient sent the following Backplane message today, 4/15/24 at 11:10am, as copied/pasted below from Backplane encounter dated 4/11/24:    \"Hey could you ask Dr. Reid if it's normal with my antibiotics that I'm taking to turn my urine like and orange orangeish light red color. Just wondering if I should be concerned thanks so much\"      Nurse Triage SBAR    Is this a 2nd Level Triage? YES, LICENSED PRACTITIONER REVIEW IS REQUIRED    Situation:   \"Hey could you ask Dr. Reid if it's normal with my antibiotics that I'm taking to turn my urine like and orange orangeish light red color. Just wondering if I should be concerned thanks so much\"    Background:   Patient currently on ATBx (doxycycline and metronidazole) for H.Pylori (started 4/11/24).  Over the weekend started noticing discolored urine, more-so in the mornings describing urine as \"orangish red tint\" as well as feelings of achiness and \"sluggish\" off/on since yesterday.      Assessment:   Patient reports over the weekend started noticing discolored urine, more-so in the mornings describing urine as \"orangish red tint\" as well as feelings of achiness and \"sluggish\" off/on since yesterday.  States last night felt \"shivery\" and has had times she feels feverish, \"really hot and weird shivering feeling\" but unsure if that was more due to her decreased citalopram dose from \"45 mg to 30 mg daily, stating \"I'm sensitive to medications.\"      This morning urine was an orange/red tint - denies burning, pain on voiding.  Endorses some lower abdominal cramping, \"not pain, but it comes before I urinate, more in the morning.\"  Denies flank/low back pain.  Not currently menstruating.      RN concerned about possible UTI.      Protocol Recommended Disposition:   See in Office Today or Tomorrow    Recommendation:     Patient requesting Dr. Reid input \"I trust him and feel comfortable with his opinion; I only want to see him.\"  Asking if she can come in for a lab " appointment for UA/UC only?    Routed to provider    Does the patient meet one of the following criteria for ADS visit consideration? 16+ years old, with an [facility] PCP     TIP  Providers, please consider if this condition is appropriate for management at one of our Acute and Diagnostic Services sites.     If patient is a good candidate, please use dotphrase <dot>triageresponse and select Refer to ADS to document.       Lynda Paulson RN  [facility]     Reason for Disposition   All other patients with blood in urine  (Exception: Could be normal menstrual bleeding.)    Additional Information   Negative: Shock suspected (e.g., cold/pale/clammy skin, too weak to stand, low BP, rapid pulse)   Negative: Sounds like a life-threatening emergency to the triager   Negative: Urinary catheter, questions about   Negative: Recent back or abdominal injury   Negative: Recent genital injury   Negative: Unable to urinate (or only a few drops) > 4 hours and bladder feels very full (e.g., palpable bladder or strong urge to urinate)   Negative: Diffuse (all over) muscle pains in the shoulders, arms, legs, and back and dark (cola or tea-colored) or red-colored urine   Negative: Passing pure blood or large blood clots (i.e., larger than a dime or grape)   Negative: Fever > 100.4 F (38.0 C)   Negative: Shock suspected (e.g., cold/pale/clammy skin, too weak to stand, low BP, rapid pulse)   Negative: Sounds like a life-threatening emergency to the triager   Negative: Followed a female genital area injury (e.g., labia, vagina, vulva)   Negative: Followed a male genital area injury (penis, scrotum)   Negative: Vaginal discharge   Negative: Pus (white, yellow) or bloody discharge from end of penis   Negative: Pain or burning with passing urine (urination) and pregnant   Negative: Patient sounds very sick or weak to the triager   Negative: Known sickle cell disease   Negative: Taking Coumadin (warfarin) or other  strong blood thinner, or known bleeding disorder (e.g., thrombocytopenia)   Negative: Side (flank) or back pain present   Negative: Pain or burning with passing urine (urination)   Negative: Patient wants to be seen    Protocols used: Urinary Symptoms-A-OH, Urine - Blood In-A-OH

## 2024-04-15 NOTE — TELEPHONE ENCOUNTER
Metronidazole does have the potential to cause discolored urine.  Given that she is currently on antibiotics, any urinalysis and urine culture would be unreliable.  Recommend that she continue to observe and continue therapy.  I would check a comprehensive metabolic panel blood test to make sure liver labs are normal.

## 2024-04-16 ENCOUNTER — LAB (OUTPATIENT)
Dept: LAB | Facility: CLINIC | Age: 34
End: 2024-04-16
Payer: COMMERCIAL

## 2024-04-16 DIAGNOSIS — R82.998 DARK BROWN-COLORED URINE: ICD-10-CM

## 2024-04-16 LAB
ERYTHROCYTE [DISTWIDTH] IN BLOOD BY AUTOMATED COUNT: 13.9 % (ref 10–15)
HCT VFR BLD AUTO: 37.7 % (ref 35–47)
HGB BLD-MCNC: 11.6 G/DL (ref 11.7–15.7)
MCH RBC QN AUTO: 24.9 PG (ref 26.5–33)
MCHC RBC AUTO-ENTMCNC: 30.8 G/DL (ref 31.5–36.5)
MCV RBC AUTO: 81 FL (ref 78–100)
PLATELET # BLD AUTO: 246 10E3/UL (ref 150–450)
RBC # BLD AUTO: 4.66 10E6/UL (ref 3.8–5.2)
WBC # BLD AUTO: 2.7 10E3/UL (ref 4–11)

## 2024-04-16 PROCEDURE — 85027 COMPLETE CBC AUTOMATED: CPT

## 2024-04-16 PROCEDURE — 36415 COLL VENOUS BLD VENIPUNCTURE: CPT

## 2024-04-16 PROCEDURE — 80053 COMPREHEN METABOLIC PANEL: CPT

## 2024-04-16 NOTE — TELEPHONE ENCOUNTER
Appears that the triage was completed on 4/15/24 and patient came in for labs this morning 4/16/24

## 2024-04-16 NOTE — TELEPHONE ENCOUNTER
Left message to call back for: Savi  Information to relay to patient: Please schedule with Dr Reid or transfer to RN.

## 2024-04-17 LAB
ALBUMIN SERPL BCG-MCNC: 4.2 G/DL (ref 3.5–5.2)
ALP SERPL-CCNC: 49 U/L (ref 40–150)
ALT SERPL W P-5'-P-CCNC: 17 U/L (ref 0–50)
ANION GAP SERPL CALCULATED.3IONS-SCNC: 9 MMOL/L (ref 7–15)
AST SERPL W P-5'-P-CCNC: 19 U/L (ref 0–45)
BILIRUB SERPL-MCNC: 0.4 MG/DL
BUN SERPL-MCNC: 10.7 MG/DL (ref 6–20)
CALCIUM SERPL-MCNC: 8.9 MG/DL (ref 8.6–10)
CHLORIDE SERPL-SCNC: 103 MMOL/L (ref 98–107)
CREAT SERPL-MCNC: 0.68 MG/DL (ref 0.51–0.95)
DEPRECATED HCO3 PLAS-SCNC: 26 MMOL/L (ref 22–29)
EGFRCR SERPLBLD CKD-EPI 2021: >90 ML/MIN/1.73M2
GLUCOSE SERPL-MCNC: 89 MG/DL (ref 70–99)
POTASSIUM SERPL-SCNC: 4.2 MMOL/L (ref 3.4–5.3)
PROT SERPL-MCNC: 6.6 G/DL (ref 6.4–8.3)
SODIUM SERPL-SCNC: 138 MMOL/L (ref 135–145)

## 2024-04-18 ENCOUNTER — MYC MEDICAL ADVICE (OUTPATIENT)
Dept: FAMILY MEDICINE | Facility: CLINIC | Age: 34
End: 2024-04-18

## 2024-04-19 ENCOUNTER — VIRTUAL VISIT (OUTPATIENT)
Dept: FAMILY MEDICINE | Facility: CLINIC | Age: 34
End: 2024-04-19
Payer: COMMERCIAL

## 2024-04-19 ENCOUNTER — MYC MEDICAL ADVICE (OUTPATIENT)
Dept: FAMILY MEDICINE | Facility: CLINIC | Age: 34
End: 2024-04-19

## 2024-04-19 ENCOUNTER — LAB (OUTPATIENT)
Dept: LAB | Facility: CLINIC | Age: 34
End: 2024-04-19
Payer: COMMERCIAL

## 2024-04-19 DIAGNOSIS — R30.0 DYSURIA: ICD-10-CM

## 2024-04-19 DIAGNOSIS — A04.8 H. PYLORI INFECTION: ICD-10-CM

## 2024-04-19 DIAGNOSIS — R30.0 DYSURIA: Primary | ICD-10-CM

## 2024-04-19 LAB
ALBUMIN UR-MCNC: NEGATIVE MG/DL
APPEARANCE UR: CLEAR
BACTERIA #/AREA URNS HPF: ABNORMAL /HPF
BILIRUB UR QL STRIP: ABNORMAL
COLOR UR AUTO: YELLOW
GLUCOSE UR STRIP-MCNC: NEGATIVE MG/DL
HGB UR QL STRIP: ABNORMAL
KETONES UR STRIP-MCNC: NEGATIVE MG/DL
LEUKOCYTE ESTERASE UR QL STRIP: ABNORMAL
NITRATE UR QL: NEGATIVE
PH UR STRIP: 6.5 [PH] (ref 5–8)
RBC #/AREA URNS AUTO: ABNORMAL /HPF
SP GR UR STRIP: 1.02 (ref 1–1.03)
SQUAMOUS #/AREA URNS AUTO: ABNORMAL /LPF
UROBILINOGEN UR STRIP-ACNC: 1 E.U./DL
WBC #/AREA URNS AUTO: ABNORMAL /HPF

## 2024-04-19 PROCEDURE — 81001 URINALYSIS AUTO W/SCOPE: CPT

## 2024-04-19 PROCEDURE — 87086 URINE CULTURE/COLONY COUNT: CPT

## 2024-04-19 PROCEDURE — 99214 OFFICE O/P EST MOD 30 MIN: CPT | Mod: 95 | Performed by: FAMILY MEDICINE

## 2024-04-19 RX ORDER — PHENAZOPYRIDINE HYDROCHLORIDE 200 MG/1
200 TABLET, FILM COATED ORAL 3 TIMES DAILY PRN
Qty: 30 TABLET | Refills: 1 | Status: SHIPPED | OUTPATIENT
Start: 2024-04-19 | End: 2024-04-23

## 2024-04-19 NOTE — PROGRESS NOTES
Savi is a 34 year old who is being evaluated via a billable video visit.    How would you like to obtain your AVS? MyChart  If the video visit is dropped, the invitation should be resent by: Text to cell phone: [phone]  Will anyone else be joining your video visit? No      Assessment & Plan   Problem List Items Addressed This Visit       H. pylori infection     Ongoing abdominal discomfort now with urinary burning.  I suspect that the urinary symptoms are actually a consequence of the antibiotics that she has been taking.  She did not take the bismuth initially.  This makes me wonder if she is struggling with symptoms of gastritis.  - Continue antibiotics.  - Urine: Markers of contamination.  Will proceed with urine culture.  We discussed that the urine culture is potentially unreliable given that she is currently on antibiotics.  - Increase bismuth.  Take omeprazole twice daily as prescribed.  - If not dramatically improved by early/mid next week we will plan to refer to gastroenterology.         Relevant Orders    Adult GI  Referral - Consult Only     Other Visit Diagnoses       Dysuria    -  Primary    Relevant Medications    phenazopyridine (PYRIDIUM) 200 MG tablet    Other Relevant Orders    UA Macroscopic with reflex to Microscopic and Culture (Completed)    Urine Culture Aerobic Bacterial - lab collect             Subjective   Savi is a 34 year old, presenting for the following health issues:  Results (Dx with H. Pylori. Discuss RX changes.) and UTI (C/o UTI sx-burning with urination and pressure in lower abdomen started 4-5 days ago. No fever but does not feel good.)        4/12/2024     6:52 AM   Additional Questions   Roomed by gladys     Via the Health Maintenance questionnaire, the patient has reported the following services have been completed -Cervical Cancer Screening, this information has been sent to the abstraction team.    Video Start Time:  10:17    Abdominal pain:   - she wonders  "about UTI.     - she is in quadruple therapy for h. Pylori.     - she has burning sensation.  \"Heart burn in my belly.\"     - BMs: no change.      History of Present Illness       Reason for visit:  Positive h pylori test questions    She eats 0-1 servings of fruits and vegetables daily.She consumes 0 sweetened beverage(s) daily.She exercises with enough effort to increase her heart rate 9 or less minutes per day.  She exercises with enough effort to increase her heart rate 3 or less days per week.   She is taking medications regularly.           Objective           Vitals:  No vitals were obtained today due to virtual visit.    Physical Exam  Nursing note reviewed.   Constitutional:       General: She is not in acute distress.     Appearance: Normal appearance. She is not ill-appearing.   HENT:      Head: Normocephalic and atraumatic.   Eyes:      Extraocular Movements: Extraocular movements intact.      Conjunctiva/sclera: Conjunctivae normal.   Pulmonary:      Effort: Pulmonary effort is normal.   Neurological:      Mental Status: She is alert and oriented to person, place, and time.   Psychiatric:         Attention and Perception: Attention normal.         Mood and Affect: Mood normal.         Speech: Speech normal.         Thought Content: Thought content normal.                Video-Visit Details    Type of service:  Video Visit   Video End Time:10:29 AM  Originating Location (pt. Location): Home    Distant Location (provider location):  On-site  Platform used for Video Visit: Matilda  Signed Electronically by: Sang Redding MD    "

## 2024-04-19 NOTE — PROGRESS NOTES
Savi is a 34 year old who is being evaluated via a billable video visit.    How would you like to obtain your AVS? MyChart  If the video visit is dropped, the invitation should be resent by: Text to cell phone: [phone]  Will anyone else be joining your video visit? No  {If patient encounters technical issues they should call [phone] :130407}    {PROVIDER CHARTING PREFERENCE:933271}    Subjective   Savi is a 34 year old, presenting for the following health issues:  Results (Dx with H. Pylori.) and UTI (C/o UTI sx-burning with urination and pressure in lower abdomen started 4-5 days ago. No fever but does not feel good.)  {(!) Visit Details have not yet been documented.  Please enter Visit Details and then use this list to pull in documentation. (Optional):176001}  Via the Health Maintenance questionnaire, the patient has reported the following services have been completed -Cervical Cancer Screening, this information has been sent to the abstraction team.    Video Start Time: {video visit start/end time for provider to select:794156}    UTI    History of Present Illness       Reason for visit:  Positive h pylori test questions    She eats 0-1 servings of fruits and vegetables daily.She consumes 0 sweetened beverage(s) daily.She exercises with enough effort to increase her heart rate 9 or less minutes per day.  She exercises with enough effort to increase her heart rate 3 or less days per week.   She is taking medications regularly.       {SUPERLIST (Optional):355273}  {additonal problems for provider to add (Optional):809544}    {ROS Picklists (Optional):613615}      Objective           Vitals:  No vitals were obtained today due to virtual visit.    Physical Exam   {video visit exam brief selected:906416}    {Diagnostic Test Results (Optional):667262}      Video-Visit Details    Type of service:  Video Visit   Video End Time:{video visit start/end time for provider to select:538828}  Originating Location  "(pt. Location): {video visit patient location:015664::\"Home\"}  {PROVIDER LOCATION On-site should be selected for visits conducted from your clinic location or adjoining [facility] hospital, academic office, or other nearby [facility] building. Off-site should be selected for all other provider locations, including home:726551}  Distant Location (provider location):  {virtual location provider:744044}  Platform used for Video Visit: {Virtual Visit Platforms:103309::\"Green Clean\"}  Signed Electronically by: Sang Redding MD  {Email feedback regarding this note to [email or web address]   :936802}  "

## 2024-04-19 NOTE — CONFIDENTIAL NOTE
Please call patient.  I sent her a note with her results.  Her urine sample was contaminated with vaginal secretions.  It is not consistent with UTI.  I did ask the lab to run a urine culture.

## 2024-04-19 NOTE — TELEPHONE ENCOUNTER
"Left message to call back for: Savi  Information to relay to patient: Please relay message below form Dr Redding. (Patient also told on voicemail to review provider comments in MyChart results.section.)     Dr Redding' request:  \"  Sang Redding MD7 minutes ago (1:36 PM)     Please call patient.  I sent her a note with her results.  Her urine sample was contaminated with vaginal secretions.  It is not consistent with UTI.  I did ask the lab to run a urine culture.      \"      "

## 2024-04-19 NOTE — ASSESSMENT & PLAN NOTE
Ongoing abdominal discomfort now with urinary burning.  I suspect that the urinary symptoms are actually a consequence of the antibiotics that she has been taking.  She did not take the bismuth initially.  This makes me wonder if she is struggling with symptoms of gastritis.  - Continue antibiotics.  - Urine: Markers of contamination.  Will proceed with urine culture.  We discussed that the urine culture is potentially unreliable given that she is currently on antibiotics.  - Increase bismuth.  Take omeprazole twice daily as prescribed.  - If not dramatically improved by early/mid next week we will plan to refer to gastroenterology.

## 2024-04-21 LAB — BACTERIA UR CULT: NO GROWTH

## 2024-04-23 ENCOUNTER — OFFICE VISIT (OUTPATIENT)
Dept: FAMILY MEDICINE | Facility: CLINIC | Age: 34
End: 2024-04-23
Payer: COMMERCIAL

## 2024-04-23 ENCOUNTER — TELEPHONE (OUTPATIENT)
Dept: FAMILY MEDICINE | Facility: CLINIC | Age: 34
End: 2024-04-23

## 2024-04-23 VITALS
OXYGEN SATURATION: 98 % | WEIGHT: 123 LBS | BODY MASS INDEX: 21.79 KG/M2 | HEART RATE: 99 BPM | RESPIRATION RATE: 18 BRPM | HEIGHT: 63 IN | TEMPERATURE: 98.4 F | DIASTOLIC BLOOD PRESSURE: 97 MMHG | SYSTOLIC BLOOD PRESSURE: 144 MMHG

## 2024-04-23 DIAGNOSIS — R10.9 ABDOMINAL DISCOMFORT: Primary | ICD-10-CM

## 2024-04-23 DIAGNOSIS — A04.8 H. PYLORI INFECTION: ICD-10-CM

## 2024-04-23 PROCEDURE — 99214 OFFICE O/P EST MOD 30 MIN: CPT | Performed by: FAMILY MEDICINE

## 2024-04-23 ASSESSMENT — PATIENT HEALTH QUESTIONNAIRE - PHQ9
SUM OF ALL RESPONSES TO PHQ QUESTIONS 1-9: 24
10. IF YOU CHECKED OFF ANY PROBLEMS, HOW DIFFICULT HAVE THESE PROBLEMS MADE IT FOR YOU TO DO YOUR WORK, TAKE CARE OF THINGS AT HOME, OR GET ALONG WITH OTHER PEOPLE: EXTREMELY DIFFICULT
SUM OF ALL RESPONSES TO PHQ QUESTIONS 1-9: 24

## 2024-04-23 NOTE — PROGRESS NOTES
"  Assessment & Plan   Problem List Items Addressed This Visit          Infectious/Inflammatory    H. pylori infection     H. Pylori infection dx 4/10/2024, she has been treated for that.   Now feels general stomach upset, also taking suboxone (was over using it)   Abdominal exam reveals general abdominal tenderness and suspect constipation.   She does endorse hard stool and unsatisfactory stools.   Tests reviewed ua, cbc and cmp were unremarkable for cause.   Recommend xr abdomen and on exam her exam is consistent with constipation.   Incase her sx do not get better contingency plan is gi referral which was placed today to reassure her that she has back up plan.           Other Visit Diagnoses       Abdominal discomfort    -  Primary    Relevant Orders    Adult GI  Referral - Consult Only    XR Abdomen Upright Only           Subjective   Savi is a 34 year old, presenting for the following health issues:  Mouth/Lip Problem and h-pylori    Mouth/Lip Problem           Objective    BP (!) 144/97 (BP Location: Left arm, Patient Position: Left side, Cuff Size: Adult Regular)   Pulse 99   Temp 98.4  F (36.9  C) (Oral)   Resp 18   Ht 1.594 m (5' 2.75\")   Wt 55.8 kg (123 lb)   LMP  (LMP Unknown)   SpO2 98%   BMI 21.96 kg/m    Body mass index is 21.96 kg/m .  Physical Exam  Constitutional:       Appearance: Normal appearance.   HENT:      Head: Normocephalic and atraumatic.      Mouth/Throat:      Comments: No sign of thrush.   Cardiovascular:      Rate and Rhythm: Normal rate and regular rhythm.      Heart sounds: Normal heart sounds.   Pulmonary:      Effort: Pulmonary effort is normal.      Breath sounds: Normal breath sounds.   Abdominal:      General: Bowel sounds are normal. There is distension (she does appear bloated.).      Palpations: Abdomen is soft. There is no mass.      Tenderness: There is abdominal tenderness (diffuse tenderness, more in the periumbilical area than other areas.). There is no " right CVA tenderness, left CVA tenderness, guarding or rebound.      Hernia: No hernia is present.   Musculoskeletal:         General: Normal range of motion.      Cervical back: Normal range of motion and neck supple.   Neurological:      General: No focal deficit present.      Mental Status: She is alert and oriented to person, place, and time.                Signed Electronically by: Armida Cordero MD  Answers submitted by the patient for this visit:  Patient Health Questionnaire (Submitted on 4/23/2024)  If you checked off any problems, how difficult have these problems made it for you to do your work, take care of things at home, or get along with other people?: Extremely difficult  PHQ9 TOTAL SCORE: 24

## 2024-04-23 NOTE — ASSESSMENT & PLAN NOTE
H. Pylori infection dx 4/10/2024, she has been treated for that.   Now feels general stomach upset, also taking suboxone (was over using it)   Abdominal exam reveals general abdominal tenderness and suspect constipation.   She does endorse hard stool and unsatisfactory stools.   Tests reviewed ua, cbc and cmp were unremarkable for cause.   Recommend xr abdomen and on exam her exam is consistent with constipation.   Incase her sx do not get better contingency plan is gi referral which was placed today to reassure her that she has back up plan.

## 2024-04-24 ENCOUNTER — MYC MEDICAL ADVICE (OUTPATIENT)
Dept: FAMILY MEDICINE | Facility: CLINIC | Age: 34
End: 2024-04-24
Payer: COMMERCIAL

## 2024-04-24 NOTE — TELEPHONE ENCOUNTER
General Call    Contacts         Type Contact Phone/Fax    04/23/2024 06:04 PM CDT Phone (Incoming) Savi Karmer (Self) [phone] (M)          Reason for Call:  Order Update    What are your questions or concerns:  Patient presents at clinic to request XR abdomen order be sent to [facility]. Fax number [phone].    Date of last appointment with provider: Today    Could we send this information to you in [employer] or would you prefer to receive a phone call?:   Patient would prefer a phone call   Okay to leave a detailed message?: Yes at Cell number on file:    Telephone Information:   Mobile [phone]       
Order needs to be physically signed for outside imaging to accept it. Printed and placed in provider mailbox for signature.  
Signed and faxed  
none

## 2024-04-25 ENCOUNTER — TELEPHONE (OUTPATIENT)
Dept: GASTROENTEROLOGY | Facility: CLINIC | Age: 34
End: 2024-04-25

## 2024-04-25 NOTE — TELEPHONE ENCOUNTER
[facility] Call Center    Phone Message    May a detailed message be left on voicemail: Yes    Reason for Call: Other: Patient is currently scheduled on 06/05/2024, as visit type New GI Urgent. This is outside the expected timeline for this referral. Patient has been added to the waitlist.      Action Taken: Message routed to:  Other: GI REFERRAL TRIAGE POOL     Travel Screening: Not Applicable

## 2024-04-29 ENCOUNTER — MYC MEDICAL ADVICE (OUTPATIENT)
Dept: FAMILY MEDICINE | Facility: CLINIC | Age: 34
End: 2024-04-29

## 2024-04-29 DIAGNOSIS — A04.8 H. PYLORI INFECTION: ICD-10-CM

## 2024-04-29 DIAGNOSIS — B37.31 VAGINAL CANDIDIASIS: Primary | ICD-10-CM

## 2024-04-29 RX ORDER — FLUCONAZOLE 150 MG/1
150 TABLET ORAL ONCE
Qty: 1 TABLET | Refills: 0 | Status: SHIPPED | OUTPATIENT
Start: 2024-04-29 | End: 2024-04-29

## 2024-05-02 ENCOUNTER — MYC MEDICAL ADVICE (OUTPATIENT)
Dept: FAMILY MEDICINE | Facility: CLINIC | Age: 34
End: 2024-05-02
Payer: COMMERCIAL

## 2024-05-03 ENCOUNTER — E-VISIT (OUTPATIENT)
Dept: FAMILY MEDICINE | Facility: CLINIC | Age: 34
End: 2024-05-03
Payer: COMMERCIAL

## 2024-05-03 ENCOUNTER — MYC MEDICAL ADVICE (OUTPATIENT)
Dept: FAMILY MEDICINE | Facility: CLINIC | Age: 34
End: 2024-05-03

## 2024-05-03 DIAGNOSIS — F33.9 EPISODE OF RECURRENT MAJOR DEPRESSIVE DISORDER, UNSPECIFIED DEPRESSION EPISODE SEVERITY (H): ICD-10-CM

## 2024-05-03 DIAGNOSIS — F17.200 NICOTINE DEPENDENCE, UNCOMPLICATED, UNSPECIFIED NICOTINE PRODUCT TYPE: Primary | ICD-10-CM

## 2024-05-03 PROCEDURE — 99421 OL DIG E/M SVC 5-10 MIN: CPT | Performed by: FAMILY MEDICINE

## 2024-05-03 RX ORDER — NICOTINE 21 MG/24HR
1 PATCH, TRANSDERMAL 24 HOURS TRANSDERMAL EVERY 24 HOURS
Qty: 42 PATCH | Refills: 0 | Status: SHIPPED | OUTPATIENT
Start: 2024-05-03 | End: 2024-06-14

## 2024-05-03 RX ORDER — NICOTINE 21 MG/24HR
1 PATCH, TRANSDERMAL 24 HOURS TRANSDERMAL EVERY 24 HOURS
Qty: 14 PATCH | Refills: 0 | Status: SHIPPED | OUTPATIENT
Start: 2024-06-14 | End: 2024-05-14

## 2024-05-03 NOTE — PATIENT INSTRUCTIONS
Nicotine Transdermal System   Habitrol, Nicoderm C-Q    Uses  For quitting smoking.    Instructions  DO NOT take this medicine by mouth.    Avoid placing the patch near the breast.    Remove the patch after 24 hours.    Keep the medicine at room temperature. Avoid heat and direct light.    This patch should not be cut.    Wash your hands before and after handling this medicine.    Remove old patch before applying new one. Change the location of the new patch.    If you have vivid dreams or trouble sleeping, you may remove the patch before going to sleep.    Ask your doctor or pharmacist about locations on your body where this patch can be used.    Remove the plastic liner that protects the sticky side of the patch before applying to the skin.    Be sure the area of skin is clean and dry before putting on a new patch.    Apply the patch to a clean, dry, hairless area.    Press the patch firmly for a few seconds to make sure it stays in place.    After removing the patch, fold it together and discard it out of reach of children and pets.    Please ask your doctor or pharmacist how you can safely dispose of used patches.    If the skin under the patch becomes irritated, remove the patch. Do not apply a new patch to the area until the skin feels better.    To avoid irritating your skin, use a different location for a new patch.    Apply the patch only to normal looking skin. Avoid areas of the skin that are red, have scrapes, or damaged.    If the patch falls off, apply a new a patch on a different location of the body.    Please tell your doctor and pharmacist about all the medicines you take. Include both prescription and over-the-counter medicines. Also tell them about any vitamins, herbal medicines, or anything else you take for your health.    If you need to stop this medicine, your doctor may wish to gradually reduce the dosage before stopping.    Do not use more than 1 patch at any one time.    Cautions  Tell  your doctor and pharmacist if you ever had an allergic reaction to a medicine. Symptoms of an allergic reaction can include trouble breathing, skin rash, itching, swelling, or severe dizziness.    Do not use the medication any more than instructed.    Avoid smoking while on this medicine. Smoking may increase your risk for stroke, heart attack, blood clots, high blood pressure, and other diseases of the heart and blood vessels.    Tell the doctor or pharmacist if you are pregnant, planning to be pregnant, or breastfeeding.    Ask your pharmacist if this medicine can interact with any of your other medicines. Be sure to tell them about all the medicines you take.    Please tell all your doctors and dentists that you are on this medicine before they provide care.    Side Effects  The following is a list of some common side effects from this medicine. Please speak with your doctor about what you should do if you experience these or other side effects.    skin irritation where medicine is applied    If you have any of the following side effects, you may be getting too much medicine. Please contact your doctor to let them know about these side effects.    diarrhea  dizziness  nausea  rapid heartbeat  vomiting    A few people may have an allergic reaction to this medicine. Symptoms can include difficulty breathing, skin rash, itching, swelling, or severe dizziness. If you notice any of these symptoms, seek medical help quickly.    Extra  Please speak with your doctor, nurse, or pharmacist if you have any questions about this medicine.      [email or web address]  IMPORTANT NOTE: This document tells you briefly how to take your medicine, but it does not tell you all there is to know about it. Your doctor or pharmacist may give you other documents about your medicine. Please talk to them if you have any questions. Always follow their advice. There is a more complete description of this medicine  available in English. Scan this code on your smartphone or tablet or use the web address below. You can also ask your pharmacist for a printout. If you have any questions, please ask your pharmacist.   2021 [employer].      9619-2598 [employer]. All rights reserved. This information is not intended as a substitute for professional medical care. Always follow your healthcare professional's instructions.    I sent NicoDerm patches to your pharmacy with a taper.  You should do a few weeks of 21 mg per 24 hours and then decrease to 14 and later to 7 mg.    Please reply/call with questions.    Sang Redding MD    (The above note was created using dictation software.  Despite my best efforts, irregularities of text may appear in my messages.  Please excuse these errors.  The software will frequently confuse pronouns such as he, she, you, etc.)

## 2024-05-05 RX ORDER — CITALOPRAM HYDROBROMIDE 20 MG/1
30 TABLET ORAL DAILY
Qty: 45 TABLET | Refills: 0 | Status: SHIPPED | OUTPATIENT
Start: 2024-05-05

## 2024-05-09 ENCOUNTER — VIRTUAL VISIT (OUTPATIENT)
Dept: FAMILY MEDICINE | Facility: CLINIC | Age: 34
End: 2024-05-09
Payer: COMMERCIAL

## 2024-05-09 DIAGNOSIS — A04.8 H. PYLORI INFECTION: Primary | ICD-10-CM

## 2024-05-09 PROCEDURE — 99213 OFFICE O/P EST LOW 20 MIN: CPT | Mod: 95

## 2024-05-09 RX ORDER — FLUCONAZOLE 150 MG/1
TABLET ORAL
COMMUNITY
Start: 2024-04-29 | End: 2024-05-14

## 2024-05-09 RX ORDER — CHOLECALCIFEROL (VITAMIN D3) 50 MCG
1 TABLET ORAL
COMMUNITY
Start: 2023-12-12 | End: 2024-05-14

## 2024-05-09 ASSESSMENT — ENCOUNTER SYMPTOMS: DIARRHEA: 1

## 2024-05-09 NOTE — PROGRESS NOTES
Savi is a 34 year old who is being evaluated via a billable video visit.    How would you like to obtain your AVS? MyChart  If the video visit is dropped, the invitation should be resent by: Text to cell phone: [phone]  Will anyone else be joining your video visit? No      Assessment & Plan   Problem List Items Addressed This Visit       H. pylori infection - Primary     Patient presents via virtual visit for follow up on her recent H. pylori infection.  She was diagnosed 4/10/2024 and began treatment 4/12/2024, with the last day of antibiotics being 4/26/2024.  We discussed that clearance needs to be tested at minimum 4 weeks after completion of therapy, so she should not to have repeat H. pylori antigen completed prior to/20 4/2024.  She does have an active order in the system and I instructed her to stop by the clinic to obtain a kit which she can bring back for testing.  Symptoms have improved today, which is reassuring.  She was requesting continuing omeprazole, which seems reasonable.  I would like her to decrease her dose to 20 mg at some point with continued improvement in symptoms.  She should then transition to famotidine to avoid rebound reflux.  Should consider referral to gastroenterology if symptoms return for antigen returns positive.  Patient expressed understanding of and agreement with this plan.  All questions were answered.         Relevant Medications    omeprazole (PRILOSEC) 20 MG DR sunil Stokes   Savi is a 34 year old, presenting for the following health issues:  Diarrhea (Has cleared up and wants to make sure it is gone request re test)        5/9/2024    12:36 PM   Additional Questions   Roomed by ac   Accompanied by self     Video Start Time: 12:45 PM    Requesting re-testing for H Pylori  Last day of treatment was 4/26. She tolerated treatment fine. Is interested in continuing with omeprazole as she has found it quite helpful with reflux  Has lost weight with the  infection. Working with nutrition to improve this. Motivated.  Within the last few days she feels like symptoms have turned a corner. Appetite and tolerance of PO intake has improved. Abdominal pain has improved.    History of Present Illness       Reason for visit:  Diarrhea    She eats 2-3 servings of fruits and vegetables daily.She consumes 1 sweetened beverage(s) daily.She exercises with enough effort to increase her heart rate 20 to 29 minutes per day.  She exercises with enough effort to increase her heart rate 4 days per week.   She is taking medications regularly.         Objective    Vitals - Patient Reported  Weight (Patient Reported): 52.2 kg (115 lb)    Physical Exam   GENERAL: alert and no distress  EYES: Eyes grossly normal to inspection.  No discharge or erythema, or obvious scleral/conjunctival abnormalities.  RESP: No audible wheeze, cough, or visible cyanosis.    SKIN: Visible skin clear. No significant rash, abnormal pigmentation or lesions.  NEURO: Cranial nerves grossly intact.  Mentation and speech appropriate for age.  PSYCH: Appropriate affect, tone, and pace of words      Video-Visit Details    Type of service:  Video Visit   Video End Time:12:54 PM  Originating Location (pt. Location): Home    Distant Location (provider location):  On-site  Platform used for Video Visit: Matilda  Signed Electronically by: KATHLEEN Bal CNP

## 2024-05-09 NOTE — ASSESSMENT & PLAN NOTE
Patient presents via virtual visit for follow up on her recent H. pylori infection.  She was diagnosed 4/10/2024 and began treatment 4/12/2024, with the last day of antibiotics being 4/26/2024.  We discussed that clearance needs to be tested at minimum 4 weeks after completion of therapy, so she should not to have repeat H. pylori antigen completed prior to/20 4/2024.  She does have an active order in the system and I instructed her to stop by the clinic to obtain a kit which she can bring back for testing.  Symptoms have improved today, which is reassuring.  She was requesting continuing omeprazole, which seems reasonable.  I would like her to decrease her dose to 20 mg at some point with continued improvement in symptoms.  She should then transition to famotidine to avoid rebound reflux.  Should consider referral to gastroenterology if symptoms return for antigen returns positive.  Patient expressed understanding of and agreement with this plan.  All questions were answered.

## 2024-05-10 ENCOUNTER — MYC MEDICAL ADVICE (OUTPATIENT)
Dept: FAMILY MEDICINE | Facility: CLINIC | Age: 34
End: 2024-05-10
Payer: COMMERCIAL

## 2024-05-10 DIAGNOSIS — R11.0 NAUSEA: Primary | ICD-10-CM

## 2024-05-10 DIAGNOSIS — A04.8 H. PYLORI INFECTION: ICD-10-CM

## 2024-05-10 RX ORDER — SUCRALFATE 1 G/1
1 TABLET ORAL 4 TIMES DAILY
Qty: 60 TABLET | Refills: 0 | Status: SHIPPED | OUTPATIENT
Start: 2024-05-10 | End: 2024-05-14

## 2024-05-10 NOTE — TELEPHONE ENCOUNTER
"Per Virtual Visit note from 5/9/24:    \"H. pylori infection - Primary        Patient presents via virtual visit for follow up on her recent H. pylori infection.  She was diagnosed 4/10/2024 and began treatment 4/12/2024, with the last day of antibiotics being 4/26/2024.  We discussed that clearance needs to be tested at minimum 4 weeks after completion of therapy, so she should not to have repeat H. pylori antigen completed prior to/20 4/2024.  She does have an active order in the system and I instructed her to stop by the clinic to obtain a kit which she can bring back for testing.  Symptoms have improved today, which is reassuring.  She was requesting continuing omeprazole, which seems reasonable.  I would like her to decrease her dose to 20 mg at some point with continued improvement in symptoms.  She should then transition to famotidine to avoid rebound reflux.  Should consider referral to gastroenterology if symptoms return for antigen returns positive.  Patient expressed understanding of and agreement with this plan.  All questions were answered.           Relevant Medications     omeprazole (PRILOSEC) 20 MG DR capsule\"         Lynda Paulson RN  [facility]   "

## 2024-05-14 ENCOUNTER — VIRTUAL VISIT (OUTPATIENT)
Dept: FAMILY MEDICINE | Facility: CLINIC | Age: 34
End: 2024-05-14
Payer: COMMERCIAL

## 2024-05-14 DIAGNOSIS — F17.200 NICOTINE DEPENDENCE, UNCOMPLICATED, UNSPECIFIED NICOTINE PRODUCT TYPE: ICD-10-CM

## 2024-05-14 DIAGNOSIS — A04.8 H. PYLORI INFECTION: Primary | ICD-10-CM

## 2024-05-14 DIAGNOSIS — F11.20: ICD-10-CM

## 2024-05-14 PROCEDURE — 99214 OFFICE O/P EST MOD 30 MIN: CPT | Mod: 95 | Performed by: FAMILY MEDICINE

## 2024-05-14 RX ORDER — ESOMEPRAZOLE MAGNESIUM 40 MG/1
40 CAPSULE, DELAYED RELEASE ORAL 2 TIMES DAILY
Qty: 90 CAPSULE | Refills: 0 | Status: SHIPPED | OUTPATIENT
Start: 2024-05-14 | End: 2024-06-26

## 2024-05-14 RX ORDER — NICOTINE 21 MG/24HR
1 PATCH, TRANSDERMAL 24 HOURS TRANSDERMAL EVERY 24 HOURS
Qty: 28 PATCH | Refills: 1 | Status: SHIPPED | OUTPATIENT
Start: 2024-06-14 | End: 2024-07-30

## 2024-05-14 NOTE — PROGRESS NOTES
Savi is a 34 year old who is being evaluated via a billable video visit.    How would you like to obtain your AVS? MyChart  If the video visit is dropped, the invitation should be resent by: Text to cell phone: [phone]  Will anyone else be joining your video visit? No      Assessment & Plan   Problem List Items Addressed This Visit          Infectious/Inflammatory    H. pylori infection - Primary     H. Pylori - she is having increased gerd, so endoscopy ordered.   Tums recommended, to help with gerd sx.   Continue omeprazole 20 mg po bid.   She was very concerned about recognize if the H. pylori was worsening.  I told her to look for coffee-ground emesis or black tarry stools which could indicate GI bleed.  She will contact us if any of these type of symptoms are current.  She has appoint with a GI doctor on June 5.         Relevant Medications    esomeprazole (NEXIUM) 40 MG DR capsule    Other Relevant Orders    Adult GI  Referral - Procedure Only       Other    Abstinence syndrome on maintenance opioid agonist therapy, no symptoms (H)     She is quitting cigarettes and has been off cigarettes for 11 days.           Other Visit Diagnoses       Nicotine dependence, uncomplicated, unspecified nicotine product type        Relevant Medications    nicotine (NICODERM CQ) 14 MG/24HR 24 hr patch (Start on 6/14/2024)             Subjective   Savi is a 34 year old, presenting for the following health issues:  h pylori questions (Was feeling better but started vaping again and sx got worse/)      Video Start Time: 5:15 PM    History of Present Illness       Reason for visit:  Diarrhea    She eats 2-3 servings of fruits and vegetables daily.She consumes 1 sweetened beverage(s) daily.She exercises with enough effort to increase her heart rate 20 to 29 minutes per day.  She exercises with enough effort to increase her heart rate 4 days per week.   She is taking medications regularly.           Objective            Vitals:  No vitals were obtained today due to virtual visit.    Physical Exam   GENERAL: alert and no distress  EYES: Eyes grossly normal to inspection.  No discharge or erythema, or obvious scleral/conjunctival abnormalities.  RESP: No audible wheeze, cough, or visible cyanosis.    SKIN: Visible skin clear. No significant rash, abnormal pigmentation or lesions.  NEURO: Cranial nerves grossly intact.  Mentation and speech appropriate for age.  PSYCH: Appropriate affect, tone, and pace of words          Video-Visit Details    Type of service:  Video Visit   Video End Time:5:16 PM  Originating Location (pt. Location): Home  Distant Location (provider location):  On-site  Platform used for Video Visit: Matilda  Signed Electronically by: Armida Cordero MD

## 2024-05-14 NOTE — ASSESSMENT & PLAN NOTE
H. Pylori - she is having increased gerd, so endoscopy ordered.   Tums recommended, to help with gerd sx.   Continue omeprazole 20 mg po bid.   She was very concerned about recognize if the H. pylori was worsening.  I told her to look for coffee-ground emesis or black tarry stools which could indicate GI bleed.  She will contact us if any of these type of symptoms are current.  She has appoint with a GI doctor on June 5.

## 2024-05-31 ENCOUNTER — MYC MEDICAL ADVICE (OUTPATIENT)
Dept: FAMILY MEDICINE | Facility: CLINIC | Age: 34
End: 2024-05-31
Payer: COMMERCIAL

## 2024-05-31 DIAGNOSIS — F17.200 NICOTINE DEPENDENCE, UNCOMPLICATED, UNSPECIFIED NICOTINE PRODUCT TYPE: Primary | ICD-10-CM

## 2024-06-05 ENCOUNTER — VIRTUAL VISIT (OUTPATIENT)
Dept: GASTROENTEROLOGY | Facility: CLINIC | Age: 34
End: 2024-06-05
Attending: FAMILY MEDICINE
Payer: COMMERCIAL

## 2024-06-05 DIAGNOSIS — K59.00 CONSTIPATION, UNSPECIFIED CONSTIPATION TYPE: ICD-10-CM

## 2024-06-05 DIAGNOSIS — A04.8 H. PYLORI INFECTION: Primary | ICD-10-CM

## 2024-06-05 DIAGNOSIS — R10.9 ABDOMINAL DISCOMFORT: ICD-10-CM

## 2024-06-05 DIAGNOSIS — R11.0 NAUSEA: ICD-10-CM

## 2024-06-05 PROCEDURE — 99204 OFFICE O/P NEW MOD 45 MIN: CPT | Mod: 95 | Performed by: INTERNAL MEDICINE

## 2024-06-05 RX ORDER — POLYETHYLENE GLYCOL 3350 17 G/17G
1 POWDER, FOR SOLUTION ORAL 2 TIMES DAILY
Qty: 578 G | Refills: 3 | Status: SHIPPED | OUTPATIENT
Start: 2024-06-05

## 2024-06-05 NOTE — PATIENT INSTRUCTIONS
Please start taking miralax two times a day. If that isn't working let me know and we can try something else.   Please call [phone] to schedule an endoscopy. We should hold the esomeprazole for 5-7 days before the procedure - you can use tums, mylanta or gaviscon as needed during that time.

## 2024-06-05 NOTE — PROGRESS NOTES
"Virtual Visit Details    Type of service:  Video Visit   Video Start Time: {video visit start/end time for provider to select:462968}  Video End Time:{video visit start/end time for provider to select:978848}    Originating Location (pt. Location): {video visit patient location:607843::\"Home\"}  {PROVIDER LOCATION On-site should be selected for visits conducted from your clinic location or adjoining [facility] hospital, academic office, or other nearby [facility] building. Off-site should be selected for all other provider locations, including home:294843}  Distant Location (provider location):  {virtual location provider:544120}  Platform used for Video Visit: {Virtual Visit Platforms:748379::\"What They Like\"}    "

## 2024-06-05 NOTE — NURSING NOTE
Is the patient currently in the state of [place]? YES    Visit mode:VIDEO    If the visit is dropped, the patient can be reconnected by: VIDEO VISIT: Text to cell phone:   Telephone Information:   Mobile [phone]       Will anyone else be joining the visit? NO  (If patient encounters technical issues they should call [phone])    How would you like to obtain your AVS? MyChart    Are changes needed to the allergy or medication list? No    Are refills needed on medications prescribed by this physician? NO    Reason for visit: Consult    Ev HENDERSON

## 2024-06-05 NOTE — PROGRESS NOTES
HPI:    Savi presents today for a video visit to discuss abdominal pain, acid reflux and weight loss.  Was found to have H. Pylori - was treated and abdominal pain and weight loss improved but heartburn has persisted.  Has also developed a pressure sensation in her abdomen that happens after eating or drinking coffee - feels it in her lower stomach and then goes all the way down to her lower abdomen. Also feels like she maybe constipated - has bowel movements but has a lot of pressure with them - this persists even after she goes.  Is taking stool softeners to have a bowel movement every day after imaging shows she was constipated but isn't working    Hasn't been checked for eradication of H. Pylori - whenever she comes off her nexium she gets sick.    Did have x-rays which showed large stool burden - as did CT scan in 2017.    Thinks she has been gaining weight now after treatment for H. Pylori    Is very worried she might have cancer - understandably has a lot of anxiety over her recent illness    Social History     Tobacco Use    Smoking status: Former     Types: Cigarettes    Smokeless tobacco: Never    Tobacco comments:     6 per day   Substance Use Topics    Alcohol use: Not on file        O:    Gen: no acute distress  HEENT: NCAT  Neck: normal ROM  Resp: nonlabored breathing  Neuro: no gross deficits  Psych: appropriate mood and affect    Assessment and Plan:    # GERD, epigastric pain, history of H. Pylori   - Somewhat improved but persistent despite treatment for H. Pylori.  Will plan for EGD to better assess - can continue esomeprazole for now but would hold for a few days prior to EGD    # constipation, generalized abdominal discomfort   - will start miralax BID - if no improvement - will have patient reach out and can try something different    # weight loss   - resolved at present - now gaining - if returns and EGD unrevealing, consider cross sectional imaging    RTC 3 months with GREG    Jyoti  DO Katerin     Video-Visit Details     Type of service:  Video Visit     Video Start Time: 8:30 AM  Video End Time (time video stopped): 8:43 AM    Originating Location (pt. Location): car (not driving)     Distant Location (provider location):  remote     Mode of Communication:  Video Conference via Atilekt

## 2024-06-06 ENCOUNTER — TELEPHONE (OUTPATIENT)
Dept: GASTROENTEROLOGY | Facility: CLINIC | Age: 34
End: 2024-06-06
Payer: COMMERCIAL

## 2024-06-06 ENCOUNTER — HOSPITAL ENCOUNTER (OUTPATIENT)
Facility: AMBULATORY SURGERY CENTER | Age: 34
End: 2024-06-06
Attending: INTERNAL MEDICINE
Payer: COMMERCIAL

## 2024-06-06 NOTE — TELEPHONE ENCOUNTER
"Endoscopy Scheduling Screen    Have you had a positive Covid test in the last 14 days?  No    What is your communication preference for Instructions and/or Bowel Prep?   MyChart    What insurance is in the chart?  Other:      Ordering/Referring Provider: Jyoti Conklin   (If ordering provider performs procedure, schedule with ordering provider unless otherwise instructed. )    BMI: Estimated body mass index is 21.96 kg/m  as calculated from the following:    Height as of 4/23/24: 1.594 m (5' 2.75\").    Weight as of 4/23/24: 55.8 kg (123 lb).     Sedation Ordered  MAC/deep sedation.   BMI<= 45 45 < BMI <= 48 48 < BMI < = 50  BMI > 50   No Restrictions No [facility]  No [facility]  [facility] with exceptions Hospital Only OR Only       Do you have a history of malignant hyperthermia?  No    (Females) Are you currently pregnant?   No     Have you been diagnosed or told you have pulmonary hypertension?   No    Do you have an LVAD?  No    Have you been told you have moderate to severe sleep apnea?  No    Have you been told you have COPD, asthma, or any other lung disease?  Yes     What breathing problems do you have?  Asthma     Do you use home oxygen?  No    Have your breathing problems required an ED visit or hospitalization in the last year?  No    Do you have any heart conditions?  No     Have you ever had or are you waiting for an organ transplant?  No. Continue scheduling, no site restrictions.    Have you had a stroke or transient ischemic attack (TIA aka \"mini stroke\" in the last 6 months?   No    Have you been diagnosed with or been told you have cirrhosis of the liver?   No    Are you currently on dialysis?   No    Do you need assistance transferring?   No    BMI: Estimated body mass index is 21.96 kg/m  as calculated from the following:    Height as of 4/23/24: 1.594 m (5' 2.75\").    Weight as of 4/23/24: 55.8 kg (123 lb).     Is patients BMI > 40 and scheduling location [facility]?  No    Do you take an injectable " medication for weight loss or diabetes (excluding insulin)?  No    Do you take the medication Naltrexone?  No    Do you take blood thinners?  No       Prep   Are you currently on dialysis or do you have chronic kidney disease?  No    Do you have a diagnosis of diabetes?  No    Do you have a diagnosis of cystic fibrosis (CF)?  No    On a regular basis do you go 3 -5 days between bowel movements?  No    BMI > 40?  No    Preferred Pharmacy:    [id number] - [place], [place] - [address] AT [facility] [address]  [address]  [facility] [address]  Phone: [phone] Fax: [phone]    [id number] - [place], [place] - [address]  [address] [facility] [address]  Phone: [phone] Fax: [phone]      Final Scheduling Details     Procedure scheduled  Upper endoscopy (EGD)    Surgeon:  Katerin     Date of procedure:  10/29     Pre-OP / PAC:   No - Not required for this site.    Location  [facility] - [facility] - Per order.    Sedation   MAC/Deep Sedation - Per order.      Patient Reminders:   You will receive a call from a Nurse to review instructions and health history.  This assessment must be completed prior to your procedure.  Failure to complete the Nurse assessment may result in the procedure being cancelled.      On the day of your procedure, please designate an adult(s) who can drive you home stay with you for the next 24 hours. The medicines used in the exam will make you sleepy. You will not be able to drive.      You cannot take public transportation, ride share services, or non-medical taxi service without a responsible caregiver.  Medical transport services are allowed with the requirement that a responsible caregiver will receive you at your destination.  We require that drivers and caregivers are confirmed prior to your procedure.

## 2024-06-17 ENCOUNTER — MYC MEDICAL ADVICE (OUTPATIENT)
Dept: GASTROENTEROLOGY | Facility: CLINIC | Age: 34
End: 2024-06-17
Payer: COMMERCIAL

## 2024-06-17 DIAGNOSIS — R10.9 ABDOMINAL DISCOMFORT: Primary | ICD-10-CM

## 2024-06-18 NOTE — TELEPHONE ENCOUNTER
Jyoti Conklin DO Heckt, MACKENZIE Stafford  Phone Number: [phone]     Stomach is still healing. She can trial gaviscon.  Start miralax every other day. Should keep upcoming appointment with Yue Ramirez and for EGD in October.  If interested - could see health psychology      MyChart message sent to patient with the above info from provider.    Nydia Marx RN

## 2024-06-18 NOTE — TELEPHONE ENCOUNTER
Jyoti Conklin DO Heckt, Angie, RN  Phone Number: [phone]     Yes - its a great sign that she has gained the weight back. Can you put in a referral to health psychology?    MyChart message sent to patient with the above from provider. Referral entered.    Nydia Marx RN

## 2024-06-24 NOTE — TELEPHONE ENCOUNTER
Jyoti Conklin DO Heckt, Angie, RN  Phone Number: [phone]     I don't see any indication to order blood work at this time, her normal labs 2 months ago are reassuring - it is normal to not be feeling 100% back to normal as I suspect she has some underlying irritable bowel syndrome.      [employer] message sent to patient with the above from provider.  Nydia Marx RN

## 2024-06-25 NOTE — TELEPHONE ENCOUNTER
Jyoti Conklin DO Heckt, MACKENZIE Stafford  Phone Number: [phone]     It can - it is unlikely and she has been treated properly for it - we have also already planned for follow-up testing with the EGD she has scheduled. I think she will greatly benefit from seeing health psychology so would encourage her again to make an appointment.      [employer] message sent to patient with the above info from provider.  Nydia Marx RN

## 2024-06-26 ENCOUNTER — MYC REFILL (OUTPATIENT)
Dept: FAMILY MEDICINE | Facility: CLINIC | Age: 34
End: 2024-06-26
Payer: COMMERCIAL

## 2024-06-26 DIAGNOSIS — A04.8 H. PYLORI INFECTION: ICD-10-CM

## 2024-06-26 RX ORDER — ESOMEPRAZOLE MAGNESIUM 40 MG/1
40 CAPSULE, DELAYED RELEASE ORAL 2 TIMES DAILY
Qty: 90 CAPSULE | Refills: 0 | Status: SHIPPED | OUTPATIENT
Start: 2024-06-26

## 2024-06-26 NOTE — CONFIDENTIAL NOTE
Notes reviewed from gastroenterology virtual visit dated 6/5.  There is a plan for EGD.  This has been scheduled for October.  Will refill.

## 2024-06-27 NOTE — TELEPHONE ENCOUNTER
Jyoti Conklin DO Heckt, MACKENZIE Stafford  Phone Number: [phone]     I think she should discuss with her PCP, doesn't sound GI related    MyChart message sent to patient with the above from provider.    Nydia Marx, MACKENZIE

## 2024-06-28 ENCOUNTER — OFFICE VISIT (OUTPATIENT)
Dept: FAMILY MEDICINE | Facility: CLINIC | Age: 34
End: 2024-06-28
Payer: COMMERCIAL

## 2024-06-28 VITALS
DIASTOLIC BLOOD PRESSURE: 86 MMHG | OXYGEN SATURATION: 99 % | WEIGHT: 127.1 LBS | HEART RATE: 74 BPM | TEMPERATURE: 98.6 F | BODY MASS INDEX: 22.52 KG/M2 | SYSTOLIC BLOOD PRESSURE: 132 MMHG | RESPIRATION RATE: 14 BRPM | HEIGHT: 63 IN

## 2024-06-28 DIAGNOSIS — N39.45 CONTINUOUS LEAKAGE OF URINE: Primary | ICD-10-CM

## 2024-06-28 DIAGNOSIS — R14.0 ABDOMINAL BLOATING: ICD-10-CM

## 2024-06-28 PROCEDURE — 99214 OFFICE O/P EST MOD 30 MIN: CPT | Performed by: FAMILY MEDICINE

## 2024-06-28 RX ORDER — AMOXICILLIN 500 MG/1
500 CAPSULE ORAL 3 TIMES DAILY
Qty: 30 CAPSULE | Refills: 0 | Status: SHIPPED | OUTPATIENT
Start: 2024-06-28 | End: 2024-07-08

## 2024-06-28 ASSESSMENT — PATIENT HEALTH QUESTIONNAIRE - PHQ9
SUM OF ALL RESPONSES TO PHQ QUESTIONS 1-9: 8
10. IF YOU CHECKED OFF ANY PROBLEMS, HOW DIFFICULT HAVE THESE PROBLEMS MADE IT FOR YOU TO DO YOUR WORK, TAKE CARE OF THINGS AT HOME, OR GET ALONG WITH OTHER PEOPLE: EXTREMELY DIFFICULT
SUM OF ALL RESPONSES TO PHQ QUESTIONS 1-9: 8

## 2024-06-28 NOTE — PROGRESS NOTES
Assessment & Plan   Problem List Items Addressed This Visit          Other    Abdominal bloating     Savi complains of 2 weeks of pelvic abdominal bloating and she is also had some urinary leakage which is new for the past 2 weeks.  She feels some genital discomfort as well.  UA/UC is ordered today.  The patient is unable to leave a sample.  She is treated empirically with amoxicillin due to symptoms.  She states she will come back in on Monday and leave a sample.    I did inform her that would be ideal to get the urine before she starts the antibiotic and she understands.  She also complains of constipation which does put her at higher risk of urinary tract infection as well, making this diagnosis more likely.    If her bloating continues despite antibiotics I would recommend consideration of pelvic ultrasound.         Relevant Orders    Wet prep - Clinic Collect     Other Visit Diagnoses       Continuous leakage of urine    -  Primary    Relevant Medications    amoxicillin (AMOXIL) 500 MG capsule    Other Relevant Orders    UA Macroscopic with reflex to Microscopic and Culture - Lab Collect    Urine Culture Aerobic Bacterial - lab collect               Subjective   Savi is a 34 year old, presenting for the following health issues:  Symptoms (Had H. Pyloric about two months ago, now having pressure on pelvic area and pelvic area due to severe constipation. ) and Imm/Inj (Declined all vaccines today. )  No fever chills, sweats or flank tenderness      6/28/2024     5:19 PM   Additional Questions   Roomed by Gregor Cuellar MA   Accompanied by Self         6/28/2024     5:19 PM   Patient Reported Additional Medications   Patient reports taking the following new medications None     History of Present Illness       Reason for visit:  Bladder pressure concern    She eats 0-1 servings of fruits and vegetables daily.She consumes 3 sweetened beverage(s) daily.She exercises with enough effort to increase her heart rate 9 or  "less minutes per day.  She exercises with enough effort to increase her heart rate 3 or less days per week.   She is taking medications regularly.           Review of Systems  Constitutional, neuro, ENT, endocrine, pulmonary, cardiac, gastrointestinal, genitourinary, musculoskeletal, integument and psychiatric systems are negative, except as otherwise noted.      Objective    /86 (BP Location: Left arm, Patient Position: Sitting, Cuff Size: Adult Regular)   Pulse 74   Temp 98.6  F (37  C) (Oral)   Resp 14   Ht 1.594 m (5' 2.75\")   Wt 57.7 kg (127 lb 1.6 oz)   SpO2 99%   BMI 22.69 kg/m    Body mass index is 22.69 kg/m .  Physical Exam  Constitutional:       Appearance: Normal appearance.   HENT:      Head: Normocephalic and atraumatic.   Cardiovascular:      Rate and Rhythm: Normal rate and regular rhythm.      Heart sounds: Normal heart sounds.   Pulmonary:      Effort: Pulmonary effort is normal.      Breath sounds: Normal breath sounds.   Abdominal:      General: There is distension (pelvic bloting and discomfort).      Tenderness: There is no right CVA tenderness or left CVA tenderness.   Musculoskeletal:         General: Normal range of motion.      Cervical back: Normal range of motion and neck supple.   Neurological:      General: No focal deficit present.      Mental Status: She is alert and oriented to person, place, and time.        UA UC and wet prep are pending          Signed Electronically by: Armida Cordero MD    "

## 2024-06-28 NOTE — ASSESSMENT & PLAN NOTE
Savi complains of 2 weeks of pelvic abdominal bloating and she is also had some urinary leakage which is new for the past 2 weeks.  She feels some genital discomfort as well.  UA/UC is ordered today.  The patient is unable to leave a sample.  She is treated empirically with amoxicillin due to symptoms.  She states she will come back in on Monday and leave a sample.    I did inform her that would be ideal to get the urine before she starts the antibiotic and she understands.  She also complains of constipation which does put her at higher risk of urinary tract infection as well, making this diagnosis more likely.    If her bloating continues despite antibiotics I would recommend consideration of pelvic ultrasound.

## 2024-07-16 ENCOUNTER — MYC REFILL (OUTPATIENT)
Dept: FAMILY MEDICINE | Facility: CLINIC | Age: 34
End: 2024-07-16
Payer: COMMERCIAL

## 2024-07-16 DIAGNOSIS — B00.9 RECURRENT HERPES SIMPLEX: ICD-10-CM

## 2024-07-16 RX ORDER — VALACYCLOVIR HYDROCHLORIDE 1 G/1
1000 TABLET, FILM COATED ORAL DAILY
Qty: 90 TABLET | Refills: 2 | Status: SHIPPED | OUTPATIENT
Start: 2024-07-16

## 2024-07-18 ENCOUNTER — OFFICE VISIT (OUTPATIENT)
Dept: FAMILY MEDICINE | Facility: CLINIC | Age: 34
End: 2024-07-18
Payer: COMMERCIAL

## 2024-07-18 VITALS
HEART RATE: 86 BPM | BODY MASS INDEX: 22.53 KG/M2 | TEMPERATURE: 98.9 F | HEIGHT: 63 IN | DIASTOLIC BLOOD PRESSURE: 82 MMHG | RESPIRATION RATE: 16 BRPM | SYSTOLIC BLOOD PRESSURE: 122 MMHG | OXYGEN SATURATION: 97 % | WEIGHT: 127.13 LBS

## 2024-07-18 DIAGNOSIS — N39.45 CONTINUOUS LEAKAGE OF URINE: ICD-10-CM

## 2024-07-18 DIAGNOSIS — A04.8 H. PYLORI INFECTION: Primary | ICD-10-CM

## 2024-07-18 LAB
ALBUMIN UR-MCNC: NEGATIVE MG/DL
APPEARANCE UR: CLEAR
BILIRUB UR QL STRIP: NEGATIVE
COLOR UR AUTO: YELLOW
GLUCOSE UR STRIP-MCNC: NEGATIVE MG/DL
HGB UR QL STRIP: NEGATIVE
KETONES UR STRIP-MCNC: NEGATIVE MG/DL
LEUKOCYTE ESTERASE UR QL STRIP: NEGATIVE
NITRATE UR QL: NEGATIVE
PH UR STRIP: 7.5 [PH] (ref 5–8)
SP GR UR STRIP: 1.01 (ref 1–1.03)
UROBILINOGEN UR STRIP-ACNC: 1 E.U./DL

## 2024-07-18 PROCEDURE — 87086 URINE CULTURE/COLONY COUNT: CPT

## 2024-07-18 PROCEDURE — 99213 OFFICE O/P EST LOW 20 MIN: CPT

## 2024-07-18 PROCEDURE — 81003 URINALYSIS AUTO W/O SCOPE: CPT

## 2024-07-18 RX ORDER — SUCRALFATE 1 G/1
1 TABLET ORAL 4 TIMES DAILY
Qty: 30 TABLET | Refills: 1 | Status: SHIPPED | OUTPATIENT
Start: 2024-07-18 | End: 2024-07-30

## 2024-07-18 NOTE — PROGRESS NOTES
Assessment & Plan   Problem List Items Addressed This Visit       H. pylori infection - Primary     Patient presents today with exacerbation of GERD symptoms after treatment for H Pylori earlier this year. These symptoms seem to have correlated with her decreasing her gabapentin dose. Currently she is taking high dose esomeprazole and has an upcoming EGD scheduling with gastroenterology. She has been seen by gastroenterology regarding her symptoms and H Pylori. She is extremely anxious in clinic and is repeating many of the same questions I can see she has asked other providers such as concerns for gastric cancer. Her symptoms do seem quite exaggerated for simple gastritis, but she has no red flag symptoms. I believe she needs an endoscopy but I encouraged her to inquire about moving this appointment up as it is scheduled >3 months out at this point. Considered outside procedural referral to [facility], however she has already established with  GI and I feel this may complicate things. We will add on carafate and I will have her watch symptoms over the next week, especially given they seemed to correlate with her other medication changes. I would recommend she follow up with gastroenterology.         Relevant Medications    sucralfate (CARAFATE) 1 GM tablet     Other Visit Diagnoses       Continuous leakage of urine               Subjective   Savi is a 34 year old, presenting for the following health issues:  Abdominal Pain (H/o H. Pylori. Sx back again.)        7/18/2024     1:44 PM   Additional Questions   Roomed by sac   Accompanied by self         7/18/2024     1:44 PM   Patient Reported Additional Medications   Patient reports taking the following new medications no     Follow up on abdominal pain  Hx of H Pylori infection. Treated. Has seen GI in the interim. Has EGD scheduled in October.   Noted that reflux was doing quite well for a period of weeks, however about 3-4 days ago after weaning herself down to  "300mg of gabapentin.  She's having some burning sensations, worse at night, which then makes her feel like she can't breathe.  She has not tested for clearance yet     History of Present Illness       Reason for visit:  Stomach burning bad again    She eats 0-1 servings of fruits and vegetables daily.She consumes 3 sweetened beverage(s) daily.She exercises with enough effort to increase her heart rate 9 or less minutes per day.  She exercises with enough effort to increase her heart rate 3 or less days per week.   She is taking medications regularly.         Objective    /82 (BP Location: Left arm, Patient Position: Sitting, Cuff Size: Adult Regular)   Pulse 86   Temp 98.9  F (37.2  C) (Oral)   Resp 16   Ht 1.593 m (5' 2.7\")   Wt 57.7 kg (127 lb 2 oz)   LMP  (LMP Unknown)   SpO2 97%   BMI 22.74 kg/m    Body mass index is 22.74 kg/m .    Physical Exam  Vitals and nursing note reviewed.   Constitutional:       Appearance: Normal appearance.   Cardiovascular:      Rate and Rhythm: Normal rate and regular rhythm.   Pulmonary:      Effort: Pulmonary effort is normal. No respiratory distress.   Neurological:      Mental Status: She is alert.   Psychiatric:         Mood and Affect: Mood is anxious.         Speech: Speech is rapid and pressured.         Behavior: Behavior normal.         Thought Content: Thought content normal.            Signed Electronically by: KATHLEEN Bal CNP    "

## 2024-07-18 NOTE — ASSESSMENT & PLAN NOTE
Patient presents today with exacerbation of GERD symptoms after treatment for H Pylori earlier this year. These symptoms seem to have correlated with her decreasing her gabapentin dose. Currently she is taking high dose esomeprazole and has an upcoming EGD scheduling with gastroenterology. She has been seen by gastroenterology regarding her symptoms and H Pylori. She is extremely anxious in clinic and is repeating many of the same questions I can see she has asked other providers such as concerns for gastric cancer. Her symptoms do seem quite exaggerated for simple gastritis, but she has no red flag symptoms. I believe she needs an endoscopy but I encouraged her to inquire about moving this appointment up as it is scheduled >3 months out at this point. Considered outside procedural referral to [facility], however she has already established with [facility] and I feel this may complicate things. We will add on carafate and I will have her watch symptoms over the next week, especially given they seemed to correlate with her other medication changes. I would recommend she follow up with gastroenterology.

## 2024-07-19 PROCEDURE — 87338 HPYLORI STOOL AG IA: CPT

## 2024-07-20 LAB — BACTERIA UR CULT: NO GROWTH

## 2024-07-23 LAB — H PYLORI AG STL QL IA: NEGATIVE

## 2024-07-29 ENCOUNTER — MYC MEDICAL ADVICE (OUTPATIENT)
Dept: GASTROENTEROLOGY | Facility: CLINIC | Age: 34
End: 2024-07-29
Payer: COMMERCIAL

## 2024-07-30 ENCOUNTER — OFFICE VISIT (OUTPATIENT)
Dept: FAMILY MEDICINE | Facility: CLINIC | Age: 34
End: 2024-07-30
Payer: COMMERCIAL

## 2024-07-30 VITALS
TEMPERATURE: 98.6 F | DIASTOLIC BLOOD PRESSURE: 78 MMHG | HEIGHT: 63 IN | RESPIRATION RATE: 16 BRPM | HEART RATE: 75 BPM | SYSTOLIC BLOOD PRESSURE: 117 MMHG | OXYGEN SATURATION: 96 % | BODY MASS INDEX: 23.4 KG/M2 | WEIGHT: 132.06 LBS

## 2024-07-30 DIAGNOSIS — R39.89 BLADDER PAIN: Primary | ICD-10-CM

## 2024-07-30 PROCEDURE — 99213 OFFICE O/P EST LOW 20 MIN: CPT

## 2024-07-30 RX ORDER — PHENAZOPYRIDINE HYDROCHLORIDE 200 MG/1
200 TABLET, FILM COATED ORAL 3 TIMES DAILY PRN
Qty: 20 TABLET | Refills: 0 | Status: SHIPPED | OUTPATIENT
Start: 2024-07-30

## 2024-07-30 ASSESSMENT — ENCOUNTER SYMPTOMS: DYSURIA: 1

## 2024-07-30 NOTE — PROGRESS NOTES
Assessment & Plan   Problem List Items Addressed This Visit       Bladder pain - Primary     Patient presents today with concerns for ongoing bladder pain and dysuria after an H. pylori diagnosis earlier this year.  She has had negative UTI and vaginal infection screenings per her report.  She has no vaginal symptoms, hematuria, fevers or other concerning symptoms.  Discussed that I am unsure if it has any relation to her H. pylori diagnosis.  That diagnosis and its treatment seems to have affected patient's health quite significantly as she has been doctoring on a frequent basis since then with various physical concerns. Given that she has had infections ruled out, we discussed the potential for bladder pain syndrome. She has a history of herpes simplex virus and is concerned that this is related, however she has no active sores. She will treat with episodic treatment on top of her suppressive treatment in the interim. I will also send in pyridium for symptoms. Expected therapeutic effects and potential side effects were discussed. We also discussed a bladder diet and avoidance of irritants. If she continues to have symptoms, I would recommend consultation with urology. Discussed reasons to return to care. Patient expressed understanding of and agreement with this plan. All questions were answered.         Relevant Medications    phenazopyridine (PYRIDIUM) 200 MG tablet    Other Relevant Orders    Adult Urology  Referral        Subjective   Savi is a 34 year old, presenting for the following health issues:  Dysuria (Continues. Started after H.Pylori. H/o herpes.)    Bladder pain  Radiates from pelvis upwards  Present ever since H Pylori  Had UTI and vaginal infections ruled out previously   Symptoms have not changed   No hematuria, vaginal discharge, cloudy urine, changes to bowel movements.    History of Present Illness       Reason for visit:  Stomach burning bad again    She eats 0-1 servings of  "fruits and vegetables daily.She consumes 3 sweetened beverage(s) daily.She exercises with enough effort to increase her heart rate 9 or less minutes per day.  She exercises with enough effort to increase her heart rate 3 or less days per week.   She is taking medications regularly.         Objective    /78 (BP Location: Left arm, Patient Position: Sitting, Cuff Size: Adult Regular)   Pulse 75   Temp 98.6  F (37  C) (Oral)   Resp 16   Ht 1.593 m (5' 2.7\")   Wt 59.9 kg (132 lb 1 oz)   LMP  (LMP Unknown)   SpO2 96%   BMI 23.62 kg/m    Body mass index is 23.62 kg/m .    Physical Exam  Vitals and nursing note reviewed.   Constitutional:       General: She is not in acute distress.     Appearance: Normal appearance.   Cardiovascular:      Rate and Rhythm: Normal rate and regular rhythm.   Pulmonary:      Effort: Pulmonary effort is normal.   Neurological:      Mental Status: She is alert.   Psychiatric:         Mood and Affect: Mood normal.         Behavior: Behavior normal.         Thought Content: Thought content normal.              Signed Electronically by: KATHLEEN Bal CNP    "

## 2024-07-30 NOTE — ASSESSMENT & PLAN NOTE
Patient presents today with concerns for ongoing bladder pain and dysuria after an H. pylori diagnosis earlier this year.  She has had negative UTI and vaginal infection screenings per her report.  She has no vaginal symptoms, hematuria, fevers or other concerning symptoms.  Discussed that I am unsure if it has any relation to her H. pylori diagnosis.  That diagnosis and its treatment seems to have affected patient's health quite significantly as she has been doctoring on a frequent basis since then with various physical concerns. Given that she has had infections ruled out, we discussed the potential for bladder pain syndrome. She has a history of herpes simplex virus and is concerned that this is related, however she has no active sores. She will treat with episodic treatment on top of her suppressive treatment in the interim. I will also send in pyridium for symptoms. Expected therapeutic effects and potential side effects were discussed. We also discussed a bladder diet and avoidance of irritants. If she continues to have symptoms, I would recommend consultation with urology. Discussed reasons to return to care. Patient expressed understanding of and agreement with this plan. All questions were answered.

## 2024-08-01 ENCOUNTER — TELEPHONE (OUTPATIENT)
Dept: FAMILY MEDICINE | Facility: CLINIC | Age: 34
End: 2024-08-01

## 2024-08-01 NOTE — TELEPHONE ENCOUNTER
Spoke with patient she is frustrated with the ongoing bladder/super pubic pain and pressure she is constantly experiencing.  She has canceled her appointment today.  Wondering if referral to uro/gyn specialist would be her best best to figure out what is going on.   Please advise on orders for patient

## 2024-08-01 NOTE — TELEPHONE ENCOUNTER
Uro/Gyn is not available with  [facility]. Order, face sheet and clinic notes faxed to [facility] Confirmation received.    Left message to call back for: Patient  Information to relay to patient: Order has been sent to [facility]. She can call [phone] to schedule at any of their locations.

## 2024-08-01 NOTE — TELEPHONE ENCOUNTER
Jyoti Conklin DO Heckt, MACKENZIE Stafford  Phone Number: [phone]     It is possible the test was falsely negative if she was on PPI at the time - we will be able to biopsy for it at the time of her EGD or if she would like, she could hold the nexium for 2 weeks and submit a new sample for an accurate result  H. Pylori would not be causing issues with her bladder      MyChart message sent with the above from provider.    Nydia Marx RN

## 2024-08-01 NOTE — TELEPHONE ENCOUNTER
Order was placed at our visit Tuesday. She can call to schedule. I did switch to priority referral. [phone]. Thank you!

## 2024-08-01 NOTE — TELEPHONE ENCOUNTER
Joyti Conklin DO Heckt, Angie, RN  Phone Number: [phone]     She can take over the counter antacids like gaviscon.      iCatapult message sent with the above info from provider.    Nydia Marx RN

## 2024-08-03 ENCOUNTER — MYC MEDICAL ADVICE (OUTPATIENT)
Dept: GASTROENTEROLOGY | Facility: CLINIC | Age: 34
End: 2024-08-03
Payer: COMMERCIAL

## 2024-08-04 ENCOUNTER — MYC MEDICAL ADVICE (OUTPATIENT)
Dept: GASTROENTEROLOGY | Facility: CLINIC | Age: 34
End: 2024-08-04
Payer: COMMERCIAL

## 2024-08-05 NOTE — TELEPHONE ENCOUNTER
Forwarded previous [employer] messages to provider addressing her concerns from multiple messages.    Nydia Marx RN

## 2024-08-05 NOTE — TELEPHONE ENCOUNTER
Jyoti Conklin DO Heckt, MACEKNZIE Stafford  Phone Number: [phone]     Reflux and h. Pylori would not be causing symptoms to her vagina nor would reflux. H. Pylori is not responsible for her hair loss either.  She can take pepcid (famotidine) 20 mg up to twice daily as needed but needs to be stopped 2-3 days prior to stool sample for H. Pylori.      CopaCast message sent to patient with the above from provider.    Nydia Marx RN

## 2024-08-12 ENCOUNTER — MYC MEDICAL ADVICE (OUTPATIENT)
Dept: GASTROENTEROLOGY | Facility: CLINIC | Age: 34
End: 2024-08-12
Payer: COMMERCIAL

## 2024-08-12 DIAGNOSIS — A04.8 H. PYLORI INFECTION: Primary | ICD-10-CM

## 2024-08-14 ENCOUNTER — TRANSFERRED RECORDS (OUTPATIENT)
Dept: HEALTH INFORMATION MANAGEMENT | Facility: CLINIC | Age: 34
End: 2024-08-14
Payer: COMMERCIAL

## 2024-08-14 ENCOUNTER — LAB REQUISITION (OUTPATIENT)
Dept: LAB | Facility: CLINIC | Age: 34
End: 2024-08-14
Payer: COMMERCIAL

## 2024-08-14 ENCOUNTER — LAB (OUTPATIENT)
Dept: LAB | Facility: CLINIC | Age: 34
End: 2024-08-14
Payer: COMMERCIAL

## 2024-08-14 DIAGNOSIS — A04.8 H. PYLORI INFECTION: ICD-10-CM

## 2024-08-14 DIAGNOSIS — L65.9 NONSCARRING HAIR LOSS, UNSPECIFIED: ICD-10-CM

## 2024-08-14 LAB — TSH SERPL DL<=0.005 MIU/L-ACNC: 0.91 UIU/ML (ref 0.3–4.2)

## 2024-08-14 PROCEDURE — 84443 ASSAY THYROID STIM HORMONE: CPT | Performed by: OBSTETRICS & GYNECOLOGY

## 2024-08-15 PROCEDURE — 87338 HPYLORI STOOL AG IA: CPT

## 2024-08-16 LAB — H PYLORI AG STL QL IA: NEGATIVE

## 2024-08-20 ENCOUNTER — VIRTUAL VISIT (OUTPATIENT)
Dept: GASTROENTEROLOGY | Facility: CLINIC | Age: 34
End: 2024-08-20
Attending: INTERNAL MEDICINE
Payer: COMMERCIAL

## 2024-08-20 DIAGNOSIS — R12 HEARTBURN: Primary | ICD-10-CM

## 2024-08-20 DIAGNOSIS — R11.10 REGURGITATION OF STOMACH CONTENTS: ICD-10-CM

## 2024-08-20 PROCEDURE — 99215 OFFICE O/P EST HI 40 MIN: CPT | Mod: 95 | Performed by: PHYSICIAN ASSISTANT

## 2024-08-20 RX ORDER — RABEPRAZOLE SODIUM 20 MG/1
20 TABLET, DELAYED RELEASE ORAL 2 TIMES DAILY
Qty: 60 TABLET | Refills: 2 | Status: SHIPPED | OUTPATIENT
Start: 2024-08-20

## 2024-08-20 RX ORDER — FAMOTIDINE 20 MG/1
20 TABLET, FILM COATED ORAL 2 TIMES DAILY
Qty: 60 TABLET | Refills: 2 | Status: SHIPPED | OUTPATIENT
Start: 2024-08-20

## 2024-08-20 NOTE — LETTER
"8/20/2024      Savi Kramer  [address]  [place] [address]      Dear Colleague,    Thank you for referring your patient, Savi Kramer, to the [facility] [place]. Please see a copy of my visit note below.    Virtual Visit Details    Type of service:  Video Visit   Joined the call at 8/20/2024, 8:54:14 am.  Left the call at 8/20/2024, 9:18:16 am.  You were on the call for 24 minutes 2 seconds .    Originating Location (pt. Location): Other in [place]    Distant Location (provider location):  Off-site  Platform used for Video Visit: [facility]            GI CLINIC VISIT    HPI: 34 year old female with PMH of anxiety/depression presenting to GI clinic for abd pain, reflux, and weight loss  Last seen with Gen GI with Dr Jyoti Conklin 6/2024, see note below    6/2024 appt  Savi presents today for a video visit to discuss abdominal pain, acid reflux and weight loss.  Was found to have H. Pylori - was treated and abdominal pain and weight loss improved but heartburn has persisted.  Has also developed a pressure sensation in her abdomen that happens after eating or drinking coffee - feels it in her lower stomach and then goes all the way down to her lower abdomen. Also feels like she maybe constipated - has bowel movements but has a lot of pressure with them - this persists even after she goes.  Is taking stool softeners to have a bowel movement every day after imaging shows she was constipated but isn't working    Hasn't been checked for eradication of H. Pylori - whenever she comes off her nexium she gets sick.    Did have x-rays which showed large stool burden - as did CT scan in 2017.    Thinks she has been gaining weight now after treatment for H. Pylori    Is very worried she might have cancer - understandably has a lot of anxiety over her recent illness    Today 8/20/24    She is still having GI symptoms - states saw a urologist and reports \"the acid is going down " "to my bladder.\" States when she has bad acid reflux, her bladder burning pain also flares. This is an external [facility] - ?interstitial cystitis. States she was given a uro med but did not want to start it yet as she wanted to target the acid reflux and try for better control  Acid reflux is largest issue, daily breakthrough despite Nexium 40 mg BID + PRN gaviscon (not daily but does help). Tums does not help. Not on pepcid   -will have occ nausea with severe heartburn but denies emesis  -no dysphagia, odynphagia, melena, abdominal pain, unintentional weight loss   -drinks 2 cups of coffee daily - admits this does flare her acid sx  -tries to eat 3 meals daily, eating PB and J sandwiches only  -She feels stress is related to her reflux as well, single mom with 3 kids   -Sleeping upright for past 4 months otherwise she gets a rush of acid coming up throat (regurg)  -not on NSAIDs but did use excedrin daily x 2 years that she stopped 4-5 mo ago  -no etoh (last use 5y ago). No smoking (stopped in past 3-4mo)  -No known FHX gastric Ca, eso Ca  She feels her abd pain is better, not having \"rawness\" like previously.  She is pleased to report this.   Stooling - having 1 BM every 1-2d, endorses incomplete evacuation. Does strain. Not on bowel regiment.   She gained weight, up to 130#, was 107# previously. This is her normal weight.   Her largest goal today is to symptomatically manage her acid reflux  She has a EGD with Dr Conklin 10/29/24. A recent H.Pylori stool antigen was neg (she reports PPI was held 14d prior to this test).     ROS: 10pt ROS performed and otherwise negative.    PAST MEDICAL HISTORY:  No past medical history on file.    PREVIOUS ABDOMINAL/GYNECOLOGIC SURGERIES:  No past surgical history on file.      PERTINENT MEDICATIONS:  Current Outpatient Medications   Medication Sig Dispense Refill     citalopram (CELEXA) 20 MG tablet Take 1.5 tablets (30 mg) by mouth daily (Increase dose once you are able to " lower your omeprazole). (Patient taking differently: Take 20 mg by mouth daily (Increase dose once you are able to lower your omeprazole).) 45 tablet 0     esomeprazole (NEXIUM) 40 MG DR capsule Take 1 capsule (40 mg) by mouth 2 times daily Take 30-60 minutes before eating. 90 capsule 0     gabapentin (NEURONTIN) 800 MG tablet Take 300 mg by mouth daily       levonorgestrel (MIRENA) 20 mcg/24 hr (5 years) IUD [LEVONORGESTREL (MIRENA) 20 MCG/24 HR (5 YEARS) IUD] 1 each by Intrauterine route.       mirtazapine (REMERON) 45 MG tablet Take 45 mg by mouth At Bedtime       phenazopyridine (PYRIDIUM) 200 MG tablet Take 1 tablet (200 mg) by mouth 3 times daily as needed for irritation 20 tablet 0     polyethylene glycol (MIRALAX) 17 GM/Dose powder Take 17 g (1 Capful) by mouth 2 times daily 578 g 3     SUBOXONE 12-3 MG FILM per film Place 1 Film under the tongue 2 times daily       valACYclovir (VALTREX) 1000 mg tablet Take 1 tablet (1,000 mg) by mouth daily 90 tablet 2       SOCIAL HISTORY:    Social History     Socioeconomic History     Marital status: Single     Spouse name: Not on file     Number of children: Not on file     Years of education: Not on file     Highest education level: Not on file   Occupational History     Not on file   Tobacco Use     Smoking status: Former     Types: Cigarettes     Smokeless tobacco: Never     Tobacco comments:     6 per day   Vaping Use     Vaping status: Former   Substance and Sexual Activity     Alcohol use: Not on file     Drug use: Not on file     Sexual activity: Not on file   Other Topics Concern     Not on file   Social History Narrative     Not on file     Social Determinants of Health     Financial Resource Strain: Not At Risk (2/14/2024)    [facility]    Financial [facility] Strain      Is it hard for you to pay for the very basics like food, housing, medical care or heating?: No   Food Insecurity: Not At Risk (2/14/2024)    Received from  [facility]    Food Insecurity      Does your food run out before you have the money to buy more?: No   Transportation Needs: Not At Risk (2/14/2024)    Received from [facility]    Transportation Needs      Does a lack of transportation keep you from your medical appointments or from getting your medications?: No   Physical Activity: Not on file   Stress: Not on file   Social Connections: Not on file   Interpersonal Safety: Low Risk  (4/23/2024)    Interpersonal Safety      Do you feel physically and emotionally safe where you currently live?: Yes      Within the past 12 months, have you been hit, slapped, kicked or otherwise physically hurt by someone?: No      Within the past 12 months, have you been humiliated or emotionally abused in other ways by your partner or ex-partner?: No   Housing Stability: High Risk (10/2/2023)    Housing Stability      Do you have housing? : Yes      Are you worried about losing your housing?: Yes       FAMILY HISTORY:  No colon/panc/esophageal/other GI CA, no other Perez or other HPS-related Jess. No IBD/celiac, no other AI/liver/thyroid disease.  No family history on file.    PHYSICAL EXAMINATION:  Vitals reviewed: LMP  (LMP Unknown)   Wt:   Wt Readings from Last 2 Encounters:   07/30/24 59.9 kg (132 lb 1 oz)   07/18/24 57.7 kg (127 lb 2 oz)      Video physical exam  General: Patient appears well in no acute distress.   Skin: No visualized rash or lesions on visualized skin  Eyes: EOMI, no erythema, sclera icterus or discharge noted  Resp: Appears to be breathing comfortably without accessory muscle usage, speaking in full sentences, no cough  MSK: Appears to have normal range of motion based on visualized movements  Neurologic: No apparent tremors, facial movements symmetric  Psych: affect normal, alert and oriented    PERTINENT STUDIES - Reviewed in EMR     Lab Results   Component Value Date    WBC 2.7 (L) 04/16/2024    WBC 3.1 (L) 03/19/2024     HGB 11.6 (L) 04/16/2024    HGB 11.9 03/19/2024     04/16/2024     03/19/2024    CHOL 165 11/27/2019    TRIG 114 11/27/2019    HDL 50 11/27/2019    ALT 17 04/16/2024    ALT 20 03/19/2024    ALT 19 11/27/2019    AST 19 04/16/2024    AST 21 03/19/2024    AST 20 11/27/2019     04/16/2024     03/19/2024     11/27/2019    BUN 10.7 04/16/2024    BUN 13.7 03/19/2024    BUN 12 11/27/2019    CO2 26 04/16/2024    CO2 26 03/19/2024    CO2 27 11/27/2019    TSH 0.91 08/14/2024    TSH 0.50 11/27/2019        Liver Function Studies -   Recent Labs   Lab Test 04/16/24  0851   PROTTOTAL 6.6   ALBUMIN 4.2   BILITOTAL 0.4   ALKPHOS 49   AST 19   ALT 17        PREVIOUS ENDOSCOPY    No results found for this or any previous visit.    ASSESSMENT/PLAN:  34 year old female with PMH of anxiety/depression presenting to GI clinic for acid reflux, h/o weight loss and abd pain.     # reflux/ heartburn  #regurg  #h/o abd pain, now much improved   #h/o weight loss, resolved   Sx are suggestive of unmanaged GERD / NERD likely compounded by undertreated constipation. Ddx includes comorbid functional disease, resolving gastritis (thought NSAID induced), gastroparesis, gastric outlet obstruction, atypical biliary disease vs other     Sx are improved since her last visit with us, only remaining sx include daily heartburn/reflux and regurg. No longer with abd pain and weight had increased.     plan  -switch PPI to rabeprazole 20 mg BID. If not covered, consider prevacid 30 mg BID + pepcid 20 mg BID .   -asked she start scheduled gaviscon extra strength TID given sx of regurg  -cont to avoid gastric irritants, advised to limit coffee   -start daily fiber, per AVS  -EGD scheduled with Dr Conklin 10/2024, asked she hold PPI 10-14d prior to this scope     Future consideration  -alternate PPI - dexilant; PCAB  -vonoprazan  -addition of alginate  -GI RD consult  -GES     #h.pylori infection, h/o   Confirmed eradicated, she  reports PPI was held 14d prior to stool test 8/15/24    RTC 1-2 weeks after EGD    Thank you for this consultation. It was a pleasure to participate in the care of this patient; please contact us with any further questions.    Yue Ramirez PA-C    Follow up: As planned above. Today, I personally spent 24 minutes in direct face to face time with the patient, of which greater than 50% of the time was spent in patient education and counseling as described above. Approximately 16 minutes were spent on indirect care associated with the patient's consultation including but not limited to review of: patient medical records to date, clinic visits, hospital records, lab results, imaging studies, procedural documentation, and coordinating care with other providers. The findings from this review are summarized in the above note. All of the above accounted for a cumulative time of 40 minutes and was performed on the date of service.       Again, thank you for allowing me to participate in the care of your patient.        Sincerely,        Yue Ramirez PA-C

## 2024-08-20 NOTE — PATIENT INSTRUCTIONS
It was a pleasure taking care of you today.  I've included a brief summary of our discussion and care plan from today's visit below.  Please review this information with your primary care provider.  _______________________________________________________________________    My recommendations are summarized as follows:    plan  -switch nexium to rabeprazole 20 mg twice daily (this was sent to pharmacy).   --If not covered, consider prevacid 30 mg twice daily + pepcid 20 mg twice daily (I sent this to pharmacy today too) . Let us know if pharmacy says anything about coverage  -start scheduled gaviscon extra strength three times daily with meals   -continue to avoid gastric irritants, advised to limit coffee   -EGD scheduled with Dr Conklin 10/29/2024, asked she hold proton pump inhibitor 10-14d prior to this scope   -start daily fiber such as citrucel 1 tsp daily x 2 weeks. Increase by a tsp weekly for a goal of 1-2 tbsp in a day. This is to help evacuate the stools which I think will help with the reflux as well     Please call our clinic or send a MyChart message to us if you have any questions or concerns. MyChart messages are answered by your nurse or doctor typically within 24 hours.  Please allow extra time on weekends and holidays    Return to GI Clinic in 1-2 weeks after EGD months to review your progress.    _______________________________________________________________________    How do I schedule labs, imaging studies, or procedures that were ordered in clinic today?      Labs: To schedule lab appointment at the [facility], use my chart or call [phone]. If you have a [place] lab closer to home where you are regularly seen you can give them a call.      Procedures: If a colonoscopy, upper endoscopy, breath test, esophageal manometry, or pH impedence was ordered today, our endoscopy team will call you to schedule this. If you have not heard from our endoscopy team within a week, please  call [phone] option 2 to schedule.      Imaging Studies: If you were scheduled for a CT scan, X-ray, MRI, ultrasound, HIDA scan, EKG or other imaging study, please call [phone] to have this scheduled.      Referral: If a referral to another specialty was ordered, expect a phone call or follow instructions above. If you have not heard from anyone regarding your referral in a week, please call our clinic to check the status.      Who do I call with any questions after my visit?  Please be in touch if there are any further questions that arise following today's visit.  There are multiple ways to contact your gastroenterology care team.       During business hours, you may reach a Gastroenterology nurse at [phone]     To schedule or reschedule an appointment, please call [phone].      You can always send a secure message through in3Dgallery.  in3Dgallery messages are answered by your nurse or doctor typically within 24 hours.  Please allow extra time on weekends and holidays.       For urgent/emergent questions after business hours, you may reach the on-call GI Fellow by contacting the [facility]  at [phone].     How will I get the results of any tests ordered?    You will receive all of your results.  If you have signed up for [employer], any tests ordered at your visit will be available to you after your physician reviews them.  Typically this takes 1-2 weeks.  If there are urgent results that require a change in your care plan, your physician or nurse will call you to discuss the next steps.       What is in3Dgallery?  in3Dgallery is a secure way for you to access all of your healthcare records from the [facility].  It is a web based computer program, so you can sign on to it from any location.  It also allows you to send secure messages to your care team.  I recommend signing up for in3Dgallery access if you have not already done so and are comfortable with using a computer.        How to I schedule a follow-up visit?  If you did not schedule a follow-up visit today, please call [phone] to schedule a follow-up office visit.      Sincerely,    Yue Ramirez PA-C  Gastroenterology

## 2024-08-20 NOTE — PROGRESS NOTES
"Virtual Visit Details    Type of service:  Video Visit   Joined the call at 8/20/2024, 8:54:14 am.  Left the call at 8/20/2024, 9:18:16 am.  You were on the call for 24 minutes 2 seconds .    Originating Location (pt. Location): Other in [place]    Distant Location (provider location):  Off-site  Platform used for Video Visit: [facility]            GI CLINIC VISIT    HPI: 34 year old female with PMH of anxiety/depression presenting to GI clinic for abd pain, reflux, and weight loss  Last seen with Gen GI with Dr Jyoti Conklin 6/2024, see note below    6/2024 appt  Savi presents today for a video visit to discuss abdominal pain, acid reflux and weight loss.  Was found to have H. Pylori - was treated and abdominal pain and weight loss improved but heartburn has persisted.  Has also developed a pressure sensation in her abdomen that happens after eating or drinking coffee - feels it in her lower stomach and then goes all the way down to her lower abdomen. Also feels like she maybe constipated - has bowel movements but has a lot of pressure with them - this persists even after she goes.  Is taking stool softeners to have a bowel movement every day after imaging shows she was constipated but isn't working    Hasn't been checked for eradication of H. Pylori - whenever she comes off her nexium she gets sick.    Did have x-rays which showed large stool burden - as did CT scan in 2017.    Thinks she has been gaining weight now after treatment for H. Pylori    Is very worried she might have cancer - understandably has a lot of anxiety over her recent illness    Today 8/20/24    She is still having GI symptoms - states saw a urologist and reports \"the acid is going down to my bladder.\" States when she has bad acid reflux, her bladder burning pain also flares. This is an external [facility] - ?interstitial cystitis. States she was given a uro med but did not want to start it yet as she wanted to target the acid reflux and try for " "better control  Acid reflux is largest issue, daily breakthrough despite Nexium 40 mg BID + PRN gaviscon (not daily but does help). Tums does not help. Not on pepcid   -will have occ nausea with severe heartburn but denies emesis  -no dysphagia, odynphagia, melena, abdominal pain, unintentional weight loss   -drinks 2 cups of coffee daily - admits this does flare her acid sx  -tries to eat 3 meals daily, eating PB and J sandwiches only  -She feels stress is related to her reflux as well, single mom with 3 kids   -Sleeping upright for past 4 months otherwise she gets a rush of acid coming up throat (regurg)  -not on NSAIDs but did use excedrin daily x 2 years that she stopped 4-5 mo ago  -no etoh (last use 5y ago). No smoking (stopped in past 3-4mo)  -No known FHX gastric Ca, eso Ca  She feels her abd pain is better, not having \"rawness\" like previously.  She is pleased to report this.   Stooling - having 1 BM every 1-2d, endorses incomplete evacuation. Does strain. Not on bowel regiment.   She gained weight, up to 130#, was 107# previously. This is her normal weight.   Her largest goal today is to symptomatically manage her acid reflux  She has a EGD with Dr Conklin 10/29/24. A recent H.Pylori stool antigen was neg (she reports PPI was held 14d prior to this test).     ROS: 10pt ROS performed and otherwise negative.    PAST MEDICAL HISTORY:  No past medical history on file.    PREVIOUS ABDOMINAL/GYNECOLOGIC SURGERIES:  No past surgical history on file.      PERTINENT MEDICATIONS:  Current Outpatient Medications   Medication Sig Dispense Refill    citalopram (CELEXA) 20 MG tablet Take 1.5 tablets (30 mg) by mouth daily (Increase dose once you are able to lower your omeprazole). (Patient taking differently: Take 20 mg by mouth daily (Increase dose once you are able to lower your omeprazole).) 45 tablet 0    esomeprazole (NEXIUM) 40 MG DR capsule Take 1 capsule (40 mg) by mouth 2 times daily Take 30-60 minutes before " eating. 90 capsule 0    gabapentin (NEURONTIN) 800 MG tablet Take 300 mg by mouth daily      levonorgestrel (MIRENA) 20 mcg/24 hr (5 years) IUD [LEVONORGESTREL (MIRENA) 20 MCG/24 HR (5 YEARS) IUD] 1 each by Intrauterine route.      mirtazapine (REMERON) 45 MG tablet Take 45 mg by mouth At Bedtime      phenazopyridine (PYRIDIUM) 200 MG tablet Take 1 tablet (200 mg) by mouth 3 times daily as needed for irritation 20 tablet 0    polyethylene glycol (MIRALAX) 17 GM/Dose powder Take 17 g (1 Capful) by mouth 2 times daily 578 g 3    SUBOXONE 12-3 MG FILM per film Place 1 Film under the tongue 2 times daily      valACYclovir (VALTREX) 1000 mg tablet Take 1 tablet (1,000 mg) by mouth daily 90 tablet 2       SOCIAL HISTORY:    Social History     Socioeconomic History    Marital status: Single     Spouse name: Not on file    Number of children: Not on file    Years of education: Not on file    Highest education level: Not on file   Occupational History    Not on file   Tobacco Use    Smoking status: Former     Types: Cigarettes    Smokeless tobacco: Never    Tobacco comments:     6 per day   Vaping Use    Vaping status: Former   Substance and Sexual Activity    Alcohol use: Not on file    Drug use: Not on file    Sexual activity: Not on file   Other Topics Concern    Not on file   Social History Narrative    Not on file     Social Determinants of Health     Financial Resource Strain: Not At Risk (2/14/2024)    [facility] [employer]    Financial Resource Strain     Is it hard for you to pay for the very basics like food, housing, medical care or heating?: No   Food Insecurity: Not At Risk (2/14/2024)    [facility] [employer], [facility]    Food Insecurity     Does your food run out before you have the money to buy more?: No   Transportation Needs: Not At Risk (2/14/2024)    [facility] [employer]    Transportation Needs     Does a lack of transportation keep you from your  medical appointments or from getting your medications?: No   Physical Activity: Not on file   Stress: Not on file   Social Connections: Not on file   Interpersonal Safety: Low Risk  (4/23/2024)    Interpersonal Safety     Do you feel physically and emotionally safe where you currently live?: Yes     Within the past 12 months, have you been hit, slapped, kicked or otherwise physically hurt by someone?: No     Within the past 12 months, have you been humiliated or emotionally abused in other ways by your partner or ex-partner?: No   Housing Stability: High Risk (10/2/2023)    Housing Stability     Do you have housing? : Yes     Are you worried about losing your housing?: Yes       FAMILY HISTORY:  No colon/panc/esophageal/other GI CA, no other Perez or other HPS-related Jess. No IBD/celiac, no other AI/liver/thyroid disease.  No family history on file.    PHYSICAL EXAMINATION:  Vitals reviewed: LMP  (LMP Unknown)   Wt:   Wt Readings from Last 2 Encounters:   07/30/24 59.9 kg (132 lb 1 oz)   07/18/24 57.7 kg (127 lb 2 oz)      Video physical exam  General: Patient appears well in no acute distress.   Skin: No visualized rash or lesions on visualized skin  Eyes: EOMI, no erythema, sclera icterus or discharge noted  Resp: Appears to be breathing comfortably without accessory muscle usage, speaking in full sentences, no cough  MSK: Appears to have normal range of motion based on visualized movements  Neurologic: No apparent tremors, facial movements symmetric  Psych: affect normal, alert and oriented    PERTINENT STUDIES - Reviewed in EMR     Lab Results   Component Value Date    WBC 2.7 (L) 04/16/2024    WBC 3.1 (L) 03/19/2024    HGB 11.6 (L) 04/16/2024    HGB 11.9 03/19/2024     04/16/2024     03/19/2024    CHOL 165 11/27/2019    TRIG 114 11/27/2019    HDL 50 11/27/2019    ALT 17 04/16/2024    ALT 20 03/19/2024    ALT 19 11/27/2019    AST 19 04/16/2024    AST 21 03/19/2024    AST 20 11/27/2019      04/16/2024     03/19/2024     11/27/2019    BUN 10.7 04/16/2024    BUN 13.7 03/19/2024    BUN 12 11/27/2019    CO2 26 04/16/2024    CO2 26 03/19/2024    CO2 27 11/27/2019    TSH 0.91 08/14/2024    TSH 0.50 11/27/2019        Liver Function Studies -   Recent Labs   Lab Test 04/16/24  0851   PROTTOTAL 6.6   ALBUMIN 4.2   BILITOTAL 0.4   ALKPHOS 49   AST 19   ALT 17        PREVIOUS ENDOSCOPY    No results found for this or any previous visit.    ASSESSMENT/PLAN:  34 year old female with PMH of anxiety/depression presenting to GI clinic for acid reflux, h/o weight loss and abd pain.     # reflux/ heartburn  #regurg  #h/o abd pain, now much improved   #h/o weight loss, resolved   Sx are suggestive of unmanaged GERD / NERD likely compounded by undertreated constipation. Ddx includes comorbid functional disease, resolving gastritis (thought NSAID induced), gastroparesis, gastric outlet obstruction, atypical biliary disease vs other     Sx are improved since her last visit with us, only remaining sx include daily heartburn/reflux and regurg. No longer with abd pain and weight had increased.     plan  -switch PPI to rabeprazole 20 mg BID. If not covered, consider prevacid 30 mg BID + pepcid 20 mg BID .   -asked she start scheduled gaviscon extra strength TID given sx of regurg  -cont to avoid gastric irritants, advised to limit coffee   -start daily fiber, per AVS  -EGD scheduled with Dr Conklin 10/2024, asked she hold PPI 10-14d prior to this scope     Future consideration  -alternate PPI - dexilant; PCAB  -vonoprazan  -addition of alginate  -GI RD consult  -GES     #h.pylori infection, h/o   Confirmed eradicated, she reports PPI was held 14d prior to stool test 8/15/24    RTC 1-2 weeks after EGD    Thank you for this consultation. It was a pleasure to participate in the care of this patient; please contact us with any further questions.    Yue Ramirez PA-C    Follow up: As planned above. Today, I personally  spent 24 minutes in direct face to face time with the patient, of which greater than 50% of the time was spent in patient education and counseling as described above. Approximately 16 minutes were spent on indirect care associated with the patient's consultation including but not limited to review of: patient medical records to date, clinic visits, hospital records, lab results, imaging studies, procedural documentation, and coordinating care with other providers. The findings from this review are summarized in the above note. All of the above accounted for a cumulative time of 40 minutes and was performed on the date of service.

## 2024-08-20 NOTE — NURSING NOTE
Current patient location: [address] [place] [address] [address]  [facility] [address]    Is the patient currently in the state of [place]? YES    Visit mode:VIDEO    If the visit is dropped, the patient can be reconnected by: VIDEO VISIT: Text to cell phone:   Telephone Information:   Mobile [phone]       Will anyone else be joining the visit? NO  (If patient encounters technical issues they should call [phone])    How would you like to obtain your AVS? MyChart    Are changes needed to the allergy or medication list? No    Are refills needed on medications prescribed by this physician? NO    Rooming Documentation:  Not applicable      Reason for visit: RECHECK    Ev FORRESTERF

## 2024-08-29 ENCOUNTER — TELEPHONE (OUTPATIENT)
Dept: GASTROENTEROLOGY | Facility: CLINIC | Age: 34
End: 2024-08-29
Payer: COMMERCIAL

## 2024-08-29 NOTE — TELEPHONE ENCOUNTER
Patient confirmed scheduled appointment:  Date: 11/19/24  Time: 2:30 pm  Visit type: return gi  Provider: Yue Ramirez  Location: virtual  Testing/imaging: NA  Additional notes: NA

## 2024-09-26 ENCOUNTER — LAB (OUTPATIENT)
Dept: LAB | Facility: CLINIC | Age: 34
End: 2024-09-26
Payer: COMMERCIAL

## 2024-09-26 ENCOUNTER — TELEPHONE (OUTPATIENT)
Dept: GASTROENTEROLOGY | Facility: CLINIC | Age: 34
End: 2024-09-26

## 2024-09-26 DIAGNOSIS — R10.9 ABDOMINAL DISCOMFORT: ICD-10-CM

## 2024-09-26 LAB
BASOPHILS # BLD AUTO: 0 10E3/UL (ref 0–0.2)
BASOPHILS NFR BLD AUTO: 0 %
EOSINOPHIL # BLD AUTO: 0.1 10E3/UL (ref 0–0.7)
EOSINOPHIL NFR BLD AUTO: 2 %
ERYTHROCYTE [DISTWIDTH] IN BLOOD BY AUTOMATED COUNT: 15.1 % (ref 10–15)
HCT VFR BLD AUTO: 35 % (ref 35–47)
HGB BLD-MCNC: 10.7 G/DL (ref 11.7–15.7)
IMM GRANULOCYTES # BLD: 0 10E3/UL
IMM GRANULOCYTES NFR BLD: 0 %
LYMPHOCYTES # BLD AUTO: 0.8 10E3/UL (ref 0.8–5.3)
LYMPHOCYTES NFR BLD AUTO: 19 %
MCH RBC QN AUTO: 23.7 PG (ref 26.5–33)
MCHC RBC AUTO-ENTMCNC: 30.6 G/DL (ref 31.5–36.5)
MCV RBC AUTO: 78 FL (ref 78–100)
MONOCYTES # BLD AUTO: 0.3 10E3/UL (ref 0–1.3)
MONOCYTES NFR BLD AUTO: 7 %
NEUTROPHILS # BLD AUTO: 2.9 10E3/UL (ref 1.6–8.3)
NEUTROPHILS NFR BLD AUTO: 72 %
PLATELET # BLD AUTO: 251 10E3/UL (ref 150–450)
RBC # BLD AUTO: 4.51 10E6/UL (ref 3.8–5.2)
WBC # BLD AUTO: 4.1 10E3/UL (ref 4–11)

## 2024-09-26 PROCEDURE — 85025 COMPLETE CBC W/AUTO DIFF WBC: CPT

## 2024-09-26 PROCEDURE — 83690 ASSAY OF LIPASE: CPT

## 2024-09-26 PROCEDURE — 36415 COLL VENOUS BLD VENIPUNCTURE: CPT

## 2024-09-26 PROCEDURE — 80053 COMPREHEN METABOLIC PANEL: CPT

## 2024-09-26 NOTE — TELEPHONE ENCOUNTER
Rescheduled: Yes,   Procedure: Upper Endoscopy [EGD]    Date: 11/14/2024   Location: [facility] [place]; [address], [place] [address]    Surgeon: PORFIRIO   Sedation Level Scheduled  MAC ,  Reason for Sedation Level PER ORDER  Instructions updated and sent: VIA [employer]     Does patient need PAC or Pre -Op Rescheduled? : NO       Did you cancel or rescheduled an EUS procedure? No.

## 2024-09-26 NOTE — TELEPHONE ENCOUNTER
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 No.

## 2024-09-27 ENCOUNTER — TELEPHONE (OUTPATIENT)
Dept: GASTROENTEROLOGY | Facility: CLINIC | Age: 34
End: 2024-09-27

## 2024-09-27 ENCOUNTER — DOCUMENTATION ONLY (OUTPATIENT)
Dept: GASTROENTEROLOGY | Facility: CLINIC | Age: 34
End: 2024-09-27

## 2024-09-27 ENCOUNTER — LAB (OUTPATIENT)
Dept: LAB | Facility: CLINIC | Age: 34
End: 2024-09-27
Payer: COMMERCIAL

## 2024-09-27 DIAGNOSIS — R10.9 ABDOMINAL DISCOMFORT: Primary | ICD-10-CM

## 2024-09-27 DIAGNOSIS — R10.9 ABDOMINAL DISCOMFORT: ICD-10-CM

## 2024-09-27 LAB
ALBUMIN SERPL BCG-MCNC: 4.2 G/DL (ref 3.5–5.2)
ALP SERPL-CCNC: 57 U/L (ref 40–150)
ALT SERPL W P-5'-P-CCNC: 15 U/L (ref 0–50)
ANION GAP SERPL CALCULATED.3IONS-SCNC: 14 MMOL/L (ref 7–15)
AST SERPL W P-5'-P-CCNC: 23 U/L (ref 0–45)
BILIRUB SERPL-MCNC: 0.4 MG/DL
BUN SERPL-MCNC: 11 MG/DL (ref 6–20)
CALCIUM SERPL-MCNC: 8.7 MG/DL (ref 8.8–10.4)
CALCIUM SERPL-MCNC: 9.1 MG/DL (ref 8.8–10.4)
CHLORIDE SERPL-SCNC: 102 MMOL/L (ref 98–107)
CREAT SERPL-MCNC: 0.59 MG/DL (ref 0.51–0.95)
EGFRCR SERPLBLD CKD-EPI 2021: >90 ML/MIN/1.73M2
ERYTHROCYTE [DISTWIDTH] IN BLOOD BY AUTOMATED COUNT: 15.1 % (ref 10–15)
FERRITIN SERPL-MCNC: 7 NG/ML (ref 6–175)
GLUCOSE SERPL-MCNC: 96 MG/DL (ref 70–99)
HCO3 SERPL-SCNC: 22 MMOL/L (ref 22–29)
HCT VFR BLD AUTO: 37.6 % (ref 35–47)
HGB BLD-MCNC: 11.6 G/DL (ref 11.7–15.7)
HOLD SPECIMEN: NORMAL
HOLD SPECIMEN: NORMAL
IRON BINDING CAPACITY (ROCHE): 406 UG/DL (ref 240–430)
IRON SATN MFR SERPL: 7 % (ref 15–46)
IRON SERPL-MCNC: 30 UG/DL (ref 37–145)
LIPASE SERPL-CCNC: 24 U/L (ref 13–60)
MCH RBC QN AUTO: 23.9 PG (ref 26.5–33)
MCHC RBC AUTO-ENTMCNC: 30.9 G/DL (ref 31.5–36.5)
MCV RBC AUTO: 77 FL (ref 78–100)
PLATELET # BLD AUTO: 250 10E3/UL (ref 150–450)
POTASSIUM SERPL-SCNC: 4 MMOL/L (ref 3.4–5.3)
PROT SERPL-MCNC: 6.9 G/DL (ref 6.4–8.3)
RBC # BLD AUTO: 4.86 10E6/UL (ref 3.8–5.2)
SODIUM SERPL-SCNC: 138 MMOL/L (ref 135–145)
WBC # BLD AUTO: 3.8 10E3/UL (ref 4–11)

## 2024-09-27 PROCEDURE — 85027 COMPLETE CBC AUTOMATED: CPT

## 2024-09-27 PROCEDURE — 83550 IRON BINDING TEST: CPT

## 2024-09-27 PROCEDURE — 36415 COLL VENOUS BLD VENIPUNCTURE: CPT

## 2024-09-27 PROCEDURE — 83540 ASSAY OF IRON: CPT

## 2024-09-27 PROCEDURE — 82728 ASSAY OF FERRITIN: CPT

## 2024-09-27 PROCEDURE — 82310 ASSAY OF CALCIUM: CPT

## 2024-09-27 NOTE — TELEPHONE ENCOUNTER
Patient confirmed scheduled appointment:  Date: 9/30/24  Time: 12 pm  Visit type: RET GI  Provider: Yue Ramirez  Location: virtual  Testing/imaging: n/a  Additional notes: taylor AQUINO

## 2024-09-27 NOTE — PROGRESS NOTES
This patient was seen today for a lab only appointment and does not have any orders available. Please place future orders if necessary. Please note, patient has been drawn.    Thanks,   Vanessa Cuellar

## 2024-09-30 ENCOUNTER — DOCUMENTATION ONLY (OUTPATIENT)
Dept: GASTROENTEROLOGY | Facility: CLINIC | Age: 34
End: 2024-09-30

## 2024-09-30 ENCOUNTER — VIRTUAL VISIT (OUTPATIENT)
Dept: GASTROENTEROLOGY | Facility: CLINIC | Age: 34
End: 2024-09-30
Payer: COMMERCIAL

## 2024-09-30 DIAGNOSIS — D50.9 IRON DEFICIENCY ANEMIA, UNSPECIFIED IRON DEFICIENCY ANEMIA TYPE: Primary | ICD-10-CM

## 2024-09-30 DIAGNOSIS — R12 HEARTBURN: ICD-10-CM

## 2024-09-30 DIAGNOSIS — Z87.898 HISTORY OF WEIGHT LOSS: ICD-10-CM

## 2024-09-30 DIAGNOSIS — R10.13 EPIGASTRIC PAIN: ICD-10-CM

## 2024-09-30 PROCEDURE — 99215 OFFICE O/P EST HI 40 MIN: CPT | Mod: 95 | Performed by: PHYSICIAN ASSISTANT

## 2024-09-30 RX ORDER — FERROUS GLUCONATE 324(38)MG
324 TABLET ORAL EVERY OTHER DAY
Qty: 45 TABLET | Refills: 1 | Status: SHIPPED | OUTPATIENT
Start: 2024-09-30

## 2024-09-30 NOTE — PATIENT INSTRUCTIONS
It was a pleasure taking care of you today.  I've included a brief summary of our discussion and care plan from today's visit below.  Please review this information with your primary care provider.  _______________________________________________________________________    My recommendations are summarized as follows:    Continue the meds as we discussed today. Try to hold the aciphex and famotidine about 10-14 days before the upper endoscopy. If you have very bad symptoms, OK to reintroduce famotidine 20 mg twice a day   Start iron every other day. I sent it to your pharmacy. Make an appt with your primary to further assess the iron deficiency.   Ordering CT scan - please call below imaging number to schedule  Ordering a colonoscopy - please call below procedures line to schedule. I've reached out to endoscopy to see if the EGD can be moved up.     Please call our clinic or send a [employer] message to us if you have any questions or concerns. [employer] messages are answered by your nurse or doctor typically within 24 hours.  Please allow extra time on weekends and holidays    Return to GI Clinic in 4 wk to review your progress.    _______________________________________________________________________    How do I schedule labs, imaging studies, or procedures that were ordered in clinic today?      Labs: To schedule lab appointment at the [facility], use my chart or call [phone]. If you have a [facility] lab closer to home where you are regularly seen you can give them a call.      Procedures: If a colonoscopy, upper endoscopy, breath test, esophageal manometry, or pH impedence was ordered today, our endoscopy team will call you to schedule this. If you have not heard from our endoscopy team within a week, please call [phone] option 2 to schedule.      Imaging Studies: If you were scheduled for a CT scan, X-ray, MRI, ultrasound, HIDA scan, EKG or other imaging study, please call [phone] to  have this scheduled.      Referral: If a referral to another specialty was ordered, expect a phone call or follow instructions above. If you have not heard from anyone regarding your referral in a week, please call our clinic to check the status.      Who do I call with any questions after my visit?  Please be in touch if there are any further questions that arise following today's visit.  There are multiple ways to contact your gastroenterology care team.       During business hours, you may reach a Gastroenterology nurse at [phone]     To schedule or reschedule an appointment, please call [phone].      You can always send a secure message through [employer].  [employer] messages are answered by your nurse or doctor typically within 24 hours.  Please allow extra time on weekends and holidays.       For urgent/emergent questions after business hours, you may reach the on-call GI Fellow by contacting the [facility]  at [phone].     How will I get the results of any tests ordered?    You will receive all of your results.  If you have signed up for expresscoint, any tests ordered at your visit will be available to you after your physician reviews them.  Typically this takes 1-2 weeks.  If there are urgent results that require a change in your care plan, your physician or nurse will call you to discuss the next steps.       What is [employer]?  [employer] is a secure way for you to access all of your healthcare records from the [facility].  It is a web based computer program, so you can sign on to it from any location.  It also allows you to send secure messages to your care team.  I recommend signing up for [employer] access if you have not already done so and are comfortable with using a computer.       How to I schedule a follow-up visit?  If you did not schedule a follow-up visit today, please call [phone] to schedule a follow-up office visit.      Sincerely,    Yue Ramirez,  PA-DWAINE  Gastroenterology

## 2024-09-30 NOTE — PROGRESS NOTES
Virtual Visit Details    Type of service:  Video Visit   Joined the call at 9/30/2024, 12:01:47 pm.  Left the call at 9/30/2024, 12:31:02 pm.  You were on the call for 29 minutes 15 seconds    Originating Location (pt. Location): Other in [place]    Distant Location (provider location):  Off-site  Platform used for Video Visit: [facility]      GI CLINIC VISIT    HPI: 34 year old female with PMH of anxiety/depression presenting to GI clinic for abd pain, reflux, and weight loss  Last seen with [facility] with Dr Jyoti Conklin 6/2024, see note below    6/2024 appt  Savi presents today for a video visit to discuss abdominal pain, acid reflux and weight loss.  Was found to have H. Pylori - was treated and abdominal pain and weight loss improved but heartburn has persisted.  Has also developed a pressure sensation in her abdomen that happens after eating or drinking coffee - feels it in her lower stomach and then goes all the way down to her lower abdomen. Also feels like she maybe constipated - has bowel movements but has a lot of pressure with them - this persists even after she goes.  Is taking stool softeners to have a bowel movement every day after imaging shows she was constipated but isn't working    Hasn't been checked for eradication of H. Pylori - whenever she comes off her nexium she gets sick.    Did have x-rays which showed large stool burden - as did CT scan in 2017.    Thinks she has been gaining weight now after treatment for H. Pylori    Is very worried she might have cancer - understandably has a lot of anxiety over her recent illness    Today 9/30/24  She had sent a [email or web address] message last week requesting blood work as she continues to feel unwell.  A CBC with differential, CMP and lipase were checked.  Calcium was slightly low and hemoglobin level had dropped to the mid tens.  On a follow-up telephone call 9/26 she informed me that the burning upper abdominal pain has improved however still continues.  She did not  "have any more of the significant severe abdominal pain.  Her weight continues to uptrend.  Appetite has been stable.  No nausea or vomiting.  She denied bleeding in her stools or black stools.  She reported regular bowel movements.  We repeated the blood work to be drawn on 9/27 and included iron panels, ferritin and a repeat calcium.  She presents today to discuss these results  She reports she has been iron deficient in the past and states she was recommended to start iron however did not start it. She is on a very limited diet, eating PB sandwiches or junk food and drinking water.  She wonders if nutrition has a role to play in this?  -Does have IUD and so no periods or very scant bleeding    -she did have a nose ring and kept hitting it and so she had multiple nosebleeds.  She has removed the ring.   -she is fatigued with low energy and is losing hair .  No chest pain, shortness of breath.  She has stopped smoking/vaping for past 4 months   Weight is up. She is at 135#.   She is taking aciphex 20 mg BID and started famotidine 20 mg BID.  Continues with Gaviscon as needed which she feels is very helpful. The heartburn continues but she just started the famotidine the other day too.   No etoh or drugs. She is on suboxone   EGD got rescheduled to November.  She is very concerned she has cancer.     8/20/24    She is still having GI symptoms - states saw a urologist and reports \"the acid is going down to my bladder.\" States when she has bad acid reflux, her bladder burning pain also flares. This is an external [facility] - ?interstitial cystitis. States she was given a uro med but did not want to start it yet as she wanted to target the acid reflux and try for better control  Acid reflux is largest issue, daily breakthrough despite Nexium 40 mg BID + PRN gaviscon (not daily but does help). Tums does not help. Not on pepcid   -will have occ nausea with severe heartburn but denies emesis  -no dysphagia, odynphagia, " "melena, abdominal pain, unintentional weight loss   -drinks 2 cups of coffee daily - admits this does flare her acid sx  -tries to eat 3 meals daily, eating PB and J sandwiches only  -She feels stress is related to her reflux as well, single mom with 3 kids   -Sleeping upright for past 4 months otherwise she gets a rush of acid coming up throat (regurg)  -not on NSAIDs but did use excedrin daily x 2 years that she stopped 4-5 mo ago  -no etoh (last use 5y ago). No smoking (stopped in past 3-4mo)  -No known FHX gastric Ca, eso Ca  She feels her abd pain is better, not having \"rawness\" like previously.  She is pleased to report this.   Stooling - having 1 BM every 1-2d, endorses incomplete evacuation. Does strain. Not on bowel regiment.   She gained weight, up to 130#, was 107# previously. This is her normal weight.   Her largest goal today is to symptomatically manage her acid reflux  She has a EGD with Dr Conklin 10/29/24. A recent H.Pylori stool antigen was neg (she reports PPI was held 14d prior to this test).     ROS: 10pt ROS performed and otherwise negative.    PAST MEDICAL HISTORY:  No past medical history on file.    PREVIOUS ABDOMINAL/GYNECOLOGIC SURGERIES:  No past surgical history on file.      PERTINENT MEDICATIONS:  Current Outpatient Medications   Medication Sig Dispense Refill    ferrous gluconate (FERGON) 324 (38 Fe) MG tablet Take 1 tablet (324 mg) by mouth every other day. 45 tablet 1    citalopram (CELEXA) 20 MG tablet Take 1.5 tablets (30 mg) by mouth daily (Increase dose once you are able to lower your omeprazole). (Patient taking differently: Take 20 mg by mouth daily (Increase dose once you are able to lower your omeprazole).) 45 tablet 0    esomeprazole (NEXIUM) 40 MG DR capsule Take 1 capsule (40 mg) by mouth 2 times daily Take 30-60 minutes before eating. 90 capsule 0    famotidine (PEPCID) 20 MG tablet Take 1 tablet (20 mg) by mouth 2 times daily 60 tablet 2    gabapentin (NEURONTIN) 800 MG " tablet Take 300 mg by mouth daily      levonorgestrel (MIRENA) 20 mcg/24 hr (5 years) IUD [LEVONORGESTREL (MIRENA) 20 MCG/24 HR (5 YEARS) IUD] 1 each by Intrauterine route.      mirtazapine (REMERON) 45 MG tablet Take 45 mg by mouth At Bedtime      phenazopyridine (PYRIDIUM) 200 MG tablet Take 1 tablet (200 mg) by mouth 3 times daily as needed for irritation 20 tablet 0    polyethylene glycol (MIRALAX) 17 GM/Dose powder Take 17 g (1 Capful) by mouth 2 times daily 578 g 3    RABEprazole (ACIPHEX) 20 MG EC tablet Take 1 tablet (20 mg) by mouth 2 times daily 60 tablet 2    SUBOXONE 12-3 MG FILM per film Place 1 Film under the tongue 2 times daily      valACYclovir (VALTREX) 1000 mg tablet Take 1 tablet (1,000 mg) by mouth daily 90 tablet 2       SOCIAL HISTORY:    Social History     Socioeconomic History    Marital status: Single     Spouse name: Not on file    Number of children: Not on file    Years of education: Not on file    Highest education level: Not on file   Occupational History    Not on file   Tobacco Use    Smoking status: Former     Types: Cigarettes    Smokeless tobacco: Never    Tobacco comments:     6 per day   Vaping Use    Vaping status: Former   Substance and Sexual Activity    Alcohol use: Not on file    Drug use: Not on file    Sexual activity: Not on file   Other Topics Concern    Not on file   Social History Narrative    Not on file     Social Determinants of Health     Financial Resource Strain: Not At Risk (2/14/2024)    Received from [employer]    Financial Resource Strain     Is it hard for you to pay for the very basics like food, housing, medical care or heating?: No   Food Insecurity: Not At Risk (2/14/2024)    Received from [facility]    Food Insecurity     Does your food run out before you have the money to buy more?: No   Transportation Needs: Not At Risk (2/14/2024)    Received from [facility]    Transportation Needs     Does  a lack of transportation keep you from your medical appointments or from getting your medications?: No   Physical Activity: Not on file   Stress: Not on file   Social Connections: Not on file   Interpersonal Safety: Low Risk  (4/23/2024)    Interpersonal Safety     Do you feel physically and emotionally safe where you currently live?: Yes     Within the past 12 months, have you been hit, slapped, kicked or otherwise physically hurt by someone?: No     Within the past 12 months, have you been humiliated or emotionally abused in other ways by your partner or ex-partner?: No   Housing Stability: High Risk (10/2/2023)    Housing Stability     Do you have housing? : Yes     Are you worried about losing your housing?: Yes       FAMILY HISTORY:  No colon/panc/esophageal/other GI CA, no other Perez or other HPS-related Jess. No IBD/celiac, no other AI/liver/thyroid disease.  No family history on file.    PHYSICAL EXAMINATION:  Vitals reviewed: There were no vitals taken for this visit.  Wt:   Wt Readings from Last 2 Encounters:   07/30/24 59.9 kg (132 lb 1 oz)   07/18/24 57.7 kg (127 lb 2 oz)      Video physical exam  General: Patient appears anxious  Skin: No visualized rash or lesions on visualized skin  Eyes: EOMI, no erythema, sclera icterus or discharge noted  Resp: Appears to be breathing comfortably without accessory muscle usage, speaking in full sentences, no cough  MSK: Appears to have normal range of motion based on visualized movements  Neurologic: No apparent tremors, facial movements symmetric  Psych: affect anxious, alert and oriented    PERTINENT STUDIES - Reviewed in EMR     Lab Results   Component Value Date    WBC 3.8 (L) 09/27/2024    WBC 4.1 09/26/2024    WBC 2.7 (L) 04/16/2024    HGB 11.6 (L) 09/27/2024    HGB 10.7 (L) 09/26/2024    HGB 11.6 (L) 04/16/2024     09/27/2024     09/26/2024     04/16/2024    CHOL 165 11/27/2019    TRIG 114 11/27/2019    HDL 50 11/27/2019    ALT 15  09/26/2024    ALT 17 04/16/2024    ALT 20 03/19/2024    AST 23 09/26/2024    AST 19 04/16/2024    AST 21 03/19/2024     09/26/2024     04/16/2024     03/19/2024    BUN 11.0 09/26/2024    BUN 10.7 04/16/2024    BUN 13.7 03/19/2024    CO2 22 09/26/2024    CO2 26 04/16/2024    CO2 26 03/19/2024    TSH 0.91 08/14/2024    TSH 0.50 11/27/2019        Liver Function Studies -   Recent Labs   Lab Test 04/16/24  0851   PROTTOTAL 6.6   ALBUMIN 4.2   BILITOTAL 0.4   ALKPHOS 49   AST 19   ALT 17        PREVIOUS ENDOSCOPY    No results found for this or any previous visit.    ASSESSMENT/PLAN:  34 year old female with PMH of anxiety/depression presenting to GI clinic for acid reflux, h/o weight loss , abd pain and DAV.      # reflux/ heartburn  #regurg  #h/o abd pain, now much improved   #h/o weight loss, resolved   Sx are suggestive of unmanaged GERD / NERD likely compounded by undertreated constipation. Ddx includes comorbid functional disease, resolving gastritis (thought NSAID induced/resolving  information from h.pylori infection), gastroparesis, gastric outlet obstruction, atypical biliary disease vs other      Sx are improved since her last visit with us, though still having daily heartburn/reflux and regurg. No longer with significant abd pain and weight had increased. Still having some upper abdomen burning sensation which I reassured her can be normal.      plan  -order CTAP w/ contrast   -cont aciphex 20 mg BID  -cont famotidine 20 mg BID  -cont gaviscon PRN  -cont to avoid gastric irritants, advised to limit coffee   -EGD scheduled with Dr Conklin 11/14/24 asked she hold PPI 10-14d prior to this scope . Will reach out to endo to see if we can move this scope up     Future consideration  -alternate PPI - dexilant; PCAB  -vonoprazan  -addition of alginate  -GI RD consult  -GES      #h.pylori infection, h/o   Confirmed eradicated, she reports PPI was held 14d prior to stool test 8/15/24   -she also  tested her children and one came up POS, this child was treated with confirmed eradication      #DAV  Seen on labs 5 mo ago, repeat Hgb holding steady at her baseline of mid 11s. No sx of overt GIB. She does endorse a very restrictive diet so this could very well contribute to her anemia - I encouraged she open up the diet and reintroduce food groups. She also reported multiple nose bleeds last week (nose ring) too. Does have IUD with very scant bleeding. She has tested NEG for h.pylori. We will rule out celiac disease toom- will obtain celiac serologies.  Given unclear etiology of DAV, I did recommend lower endoscopy too. Indication, R/B and potential complications explained to patient. Alternative management also reviewed. Recommended she start iron every other day and follow up with her primary for management of DAV.     Plan  -celiac serologies  -order cscope  -start iron every other day and follow up with primary     Thank you for this consultation. It was a pleasure to participate in the care of this patient; please contact us with any further questions.    Yue Ramirez PA-C    Follow up: As planned above. Today, I personally spent 29 minutes in direct face to face time with the patient, of which greater than 50% of the time was spent in patient education and counseling as described above. Approximately 20 minutes were spent on indirect care associated with the patient's consultation including but not limited to review of: patient medical records to date, clinic visits, hospital records, lab results, imaging studies, procedural documentation, and coordinating care with other providers. The findings from this review are summarized in the above note. All of the above accounted for a cumulative time of 49 minutes and was performed on the date of service.

## 2024-09-30 NOTE — PROGRESS NOTES
The add-on test TISSUE TRANSGLUTAMINASE JOAQUÍN IGA AND IGG  that was requested on 09/30/24 is unable to be completed. This test must be centrifuged within 2 hours from collection. Future order is available for collection. If labs are needed before next appointment, please arrange for collection.    Thanks,  Vanessa Cuellar

## 2024-09-30 NOTE — PROGRESS NOTES
The add-on test DEAMIDATED GLIADIN PEPTIDE AB IGA AND IGG  that was requested on 09/30/24 is unable to be completed due to: sample must be  from cells within 2 hours of collection. Future order is available for collection. If labs are needed before next appointment, please arrange for collection.    Thanks,  Vanessa Cuellar

## 2024-09-30 NOTE — LETTER
9/30/2024      Savi Kramer  [address]  [place] [address]      Dear Colleague,    Thank you for referring your patient, Savi Kramer, to the [facility] [place]. Please see a copy of my visit note below.    Virtual Visit Details    Type of service:  Video Visit   Joined the call at 9/30/2024, 12:01:47 pm.  Left the call at 9/30/2024, 12:31:02 pm.  You were on the call for 29 minutes 15 seconds    Originating Location (pt. Location): Other in [place]    Distant Location (provider location):  Off-site  Platform used for Video Visit: [facility]      GI CLINIC VISIT    HPI: 34 year old female with PMH of anxiety/depression presenting to GI clinic for abd pain, reflux, and weight loss  Last seen with Gen GI with Dr Jyoti Conklin 6/2024, see note below    6/2024 appt  Savi presents today for a video visit to discuss abdominal pain, acid reflux and weight loss.  Was found to have H. Pylori - was treated and abdominal pain and weight loss improved but heartburn has persisted.  Has also developed a pressure sensation in her abdomen that happens after eating or drinking coffee - feels it in her lower stomach and then goes all the way down to her lower abdomen. Also feels like she maybe constipated - has bowel movements but has a lot of pressure with them - this persists even after she goes.  Is taking stool softeners to have a bowel movement every day after imaging shows she was constipated but isn't working    Hasn't been checked for eradication of H. Pylori - whenever she comes off her nexium she gets sick.    Did have x-rays which showed large stool burden - as did CT scan in 2017.    Thinks she has been gaining weight now after treatment for H. Pylori    Is very worried she might have cancer - understandably has a lot of anxiety over her recent illness    Today 9/30/24  She had sent a [employer] message last week requesting blood work as she continues to feel unwell.  A  "CBC with differential, CMP and lipase were checked.  Calcium was slightly low and hemoglobin level had dropped to the mid tens.  On a follow-up telephone call 9/26 she informed me that the burning upper abdominal pain has improved however still continues.  She did not have any more of the significant severe abdominal pain.  Her weight continues to uptrend.  Appetite has been stable.  No nausea or vomiting.  She denied bleeding in her stools or black stools.  She reported regular bowel movements.  We repeated the blood work to be drawn on 9/27 and included iron panels, ferritin and a repeat calcium.  She presents today to discuss these results  She reports she has been iron deficient in the past and states she was recommended to start iron however did not start it. She is on a very limited diet, eating PB sandwiches or junk food and drinking water.  She wonders if nutrition has a role to play in this?  -Does have IUD and so no periods or very scant bleeding    -she did have a nose ring and kept hitting it and so she had multiple nosebleeds.  She has removed the ring.   -she is fatigued with low energy and is losing hair .  No chest pain, shortness of breath.  She has stopped smoking/vaping for past 4 months   Weight is up. She is at 135#.   She is taking aciphex 20 mg BID and started famotidine 20 mg BID.  Continues with Gaviscon as needed which she feels is very helpful. The heartburn continues but she just started the famotidine the other day too.   No etoh or drugs. She is on suboxone   EGD got rescheduled to November.  She is very concerned she has cancer.     8/20/24    She is still having GI symptoms - states saw a urologist and reports \"the acid is going down to my bladder.\" States when she has bad acid reflux, her bladder burning pain also flares. This is an external [facility] - ?interstitial cystitis. States she was given a uro med but did not want to start it yet as she wanted to target the acid reflux " "and try for better control  Acid reflux is largest issue, daily breakthrough despite Nexium 40 mg BID + PRN gaviscon (not daily but does help). Tums does not help. Not on pepcid   -will have occ nausea with severe heartburn but denies emesis  -no dysphagia, odynphagia, melena, abdominal pain, unintentional weight loss   -drinks 2 cups of coffee daily - admits this does flare her acid sx  -tries to eat 3 meals daily, eating PB and J sandwiches only  -She feels stress is related to her reflux as well, single mom with 3 kids   -Sleeping upright for past 4 months otherwise she gets a rush of acid coming up throat (regurg)  -not on NSAIDs but did use excedrin daily x 2 years that she stopped 4-5 mo ago  -no etoh (last use 5y ago). No smoking (stopped in past 3-4mo)  -No known FHX gastric Ca, eso Ca  She feels her abd pain is better, not having \"rawness\" like previously.  She is pleased to report this.   Stooling - having 1 BM every 1-2d, endorses incomplete evacuation. Does strain. Not on bowel regiment.   She gained weight, up to 130#, was 107# previously. This is her normal weight.   Her largest goal today is to symptomatically manage her acid reflux  She has a EGD with Dr Conklin 10/29/24. A recent H.Pylori stool antigen was neg (she reports PPI was held 14d prior to this test).     ROS: 10pt ROS performed and otherwise negative.    PAST MEDICAL HISTORY:  No past medical history on file.    PREVIOUS ABDOMINAL/GYNECOLOGIC SURGERIES:  No past surgical history on file.      PERTINENT MEDICATIONS:  Current Outpatient Medications   Medication Sig Dispense Refill     ferrous gluconate (FERGON) 324 (38 Fe) MG tablet Take 1 tablet (324 mg) by mouth every other day. 45 tablet 1     citalopram (CELEXA) 20 MG tablet Take 1.5 tablets (30 mg) by mouth daily (Increase dose once you are able to lower your omeprazole). (Patient taking differently: Take 20 mg by mouth daily (Increase dose once you are able to lower your omeprazole).) " 45 tablet 0     esomeprazole (NEXIUM) 40 MG DR capsule Take 1 capsule (40 mg) by mouth 2 times daily Take 30-60 minutes before eating. 90 capsule 0     famotidine (PEPCID) 20 MG tablet Take 1 tablet (20 mg) by mouth 2 times daily 60 tablet 2     gabapentin (NEURONTIN) 800 MG tablet Take 300 mg by mouth daily       levonorgestrel (MIRENA) 20 mcg/24 hr (5 years) IUD [LEVONORGESTREL (MIRENA) 20 MCG/24 HR (5 YEARS) IUD] 1 each by Intrauterine route.       mirtazapine (REMERON) 45 MG tablet Take 45 mg by mouth At Bedtime       phenazopyridine (PYRIDIUM) 200 MG tablet Take 1 tablet (200 mg) by mouth 3 times daily as needed for irritation 20 tablet 0     polyethylene glycol (MIRALAX) 17 GM/Dose powder Take 17 g (1 Capful) by mouth 2 times daily 578 g 3     RABEprazole (ACIPHEX) 20 MG EC tablet Take 1 tablet (20 mg) by mouth 2 times daily 60 tablet 2     SUBOXONE 12-3 MG FILM per film Place 1 Film under the tongue 2 times daily       valACYclovir (VALTREX) 1000 mg tablet Take 1 tablet (1,000 mg) by mouth daily 90 tablet 2       SOCIAL HISTORY:    Social History     Socioeconomic History     Marital status: Single     Spouse name: Not on file     Number of children: Not on file     Years of education: Not on file     Highest education level: Not on file   Occupational History     Not on file   Tobacco Use     Smoking status: Former     Types: Cigarettes     Smokeless tobacco: Never     Tobacco comments:     6 per day   Vaping Use     Vaping status: Former   Substance and Sexual Activity     Alcohol use: Not on file     Drug use: Not on file     Sexual activity: Not on file   Other Topics Concern     Not on file   Social History Narrative     Not on file     Social Determinants of Health     Financial Resource Strain: Not At Risk (2/14/2024)    [facility]    Financial [facility] Strain      Is it hard for you to pay for the very basics like food, housing, medical care or heating?: No   Food  Insecurity: Not At Risk (2/14/2024)    [facility] from [facility]    Food Insecurity      Does your food run out before you have the money to buy more?: No   Transportation Needs: Not At Risk (2/14/2024)    [facility]    Transportation Needs      Does a lack of transportation keep you from your medical appointments or from getting your medications?: No   Physical Activity: Not on file   Stress: Not on file   Social Connections: Not on file   Interpersonal Safety: Low Risk  (4/23/2024)    Interpersonal Safety      Do you feel physically and emotionally safe where you currently live?: Yes      Within the past 12 months, have you been hit, slapped, kicked or otherwise physically hurt by someone?: No      Within the past 12 months, have you been humiliated or emotionally abused in other ways by your partner or ex-partner?: No   Housing Stability: High Risk (10/2/2023)    Housing Stability      Do you have housing? : Yes      Are you worried about losing your housing?: Yes       FAMILY HISTORY:  No colon/panc/esophageal/other GI CA, no other Perez or other HPS-related Jess. No IBD/celiac, no other AI/liver/thyroid disease.  No family history on file.    PHYSICAL EXAMINATION:  Vitals reviewed: There were no vitals taken for this visit.  Wt:   Wt Readings from Last 2 Encounters:   07/30/24 59.9 kg (132 lb 1 oz)   07/18/24 57.7 kg (127 lb 2 oz)      Video physical exam  General: Patient appears well in no acute distress.   Skin: No visualized rash or lesions on visualized skin  Eyes: EOMI, no erythema, sclera icterus or discharge noted  Resp: Appears to be breathing comfortably without accessory muscle usage, speaking in full sentences, no cough  MSK: Appears to have normal range of motion based on visualized movements  Neurologic: No apparent tremors, facial movements symmetric  Psych: affect normal, alert and oriented    PERTINENT STUDIES - Reviewed in EMR     Lab Results    Component Value Date    WBC 3.8 (L) 09/27/2024    WBC 4.1 09/26/2024    WBC 2.7 (L) 04/16/2024    HGB 11.6 (L) 09/27/2024    HGB 10.7 (L) 09/26/2024    HGB 11.6 (L) 04/16/2024     09/27/2024     09/26/2024     04/16/2024    CHOL 165 11/27/2019    TRIG 114 11/27/2019    HDL 50 11/27/2019    ALT 15 09/26/2024    ALT 17 04/16/2024    ALT 20 03/19/2024    AST 23 09/26/2024    AST 19 04/16/2024    AST 21 03/19/2024     09/26/2024     04/16/2024     03/19/2024    BUN 11.0 09/26/2024    BUN 10.7 04/16/2024    BUN 13.7 03/19/2024    CO2 22 09/26/2024    CO2 26 04/16/2024    CO2 26 03/19/2024    TSH 0.91 08/14/2024    TSH 0.50 11/27/2019        Liver Function Studies -   Recent Labs   Lab Test 04/16/24  0851   PROTTOTAL 6.6   ALBUMIN 4.2   BILITOTAL 0.4   ALKPHOS 49   AST 19   ALT 17        PREVIOUS ENDOSCOPY    No results found for this or any previous visit.    ASSESSMENT/PLAN:  34 year old female with PMH of anxiety/depression presenting to GI clinic for acid reflux, h/o weight loss , abd pain and DAV.      # reflux/ heartburn  #regurg  #h/o abd pain, now much improved   #h/o weight loss, resolved   Sx are suggestive of unmanaged GERD / NERD likely compounded by undertreated constipation. Ddx includes comorbid functional disease, resolving gastritis (thought NSAID induced/resolving  information from h.pylori infection), gastroparesis, gastric outlet obstruction, atypical biliary disease vs other      Sx are improved since her last visit with us, though still having daily heartburn/reflux and regurg. No longer with significant abd pain and weight had increased. Still having some upper abdomen burning sensation which I reassured her can be normal.      plan  -order CTAP w/ contrast   -cont aciphex 20 mg BID  -cont famotidine 20 mg BID  -cont gaviscon PRN  -cont to avoid gastric irritants, advised to limit coffee   -EGD scheduled with Dr Conklin 11/14/24 asked she hold PPI 10-14d  prior to this scope . Will reach out to endo to see if we can move this scope up     Future consideration  -alternate PPI - dexilant; PCAB  -vonoprazan  -addition of alginate  -GI RD consult  -GES      #h.pylori infection, h/o   Confirmed eradicated, she reports PPI was held 14d prior to stool test 8/15/24   -she also tested her children and one came up POS, this child was treated with confirmed eradication      #DAV  Seen on labs 5 mo ago, repeat Hgb holding steady at her baseline of mid 11s. No sx of overt GIB. She does endorse a very restrictive diet so this could very well contribute to her anemia - I encouraged she open up the diet and reintroduce food groups. She also reported multiple nose bleeds last week (nose ring) too. Does have IUD with very scant bleeding. She has tested NEG for h.pylori. We will rule out celiac disease toom- will obtain celiac serologies.  Given unclear etiology of DAV, I did recommend lower endoscopy too. Indication, R/B and potential complications explained to patient. Alternative management also reviewed. Recommended she start iron every other day and follow up with her primary for management of DAV.     Plan  -celiac serologies  -order cscope  -start iron every other day and follow up with primary     Thank you for this consultation. It was a pleasure to participate in the care of this patient; please contact us with any further questions.    Yue Ramirez PA-C    Follow up: As planned above. Today, I personally spent 29 minutes in direct face to face time with the patient, of which greater than 50% of the time was spent in patient education and counseling as described above. Approximately 20 minutes were spent on indirect care associated with the patient's consultation including but not limited to review of: patient medical records to date, clinic visits, hospital records, lab results, imaging studies, procedural documentation, and coordinating care with other providers. The  findings from this review are summarized in the above note. All of the above accounted for a cumulative time of 49 minutes and was performed on the date of service.       Again, thank you for allowing me to participate in the care of your patient.        Sincerely,        Yue Ramirez PA-C

## 2024-09-30 NOTE — CONFIDENTIAL NOTE
Virtual Visit Details    Originating Location (pt. Location): Other in [place]  {PROVIDER LOCATION On-site should be selected for visits conducted from your clinic location or adjoining [facility] hospital, academic office, or other nearby [facility] building. Off-site should be selected for all other provider locations, including home:[id number]}  Distant Location (provider location):  Off-site  Platform used for Video Visit: Matilda    Joined the call at 9/30/2024, 12:01:47 pm.  Left the call at 9/30/2024, 12:31:02 pm.  You were on the call for 29 minutes 15 seconds      GI CLINIC VISIT    HPI: 34 year old female with PMH of anxiety/depression presenting to GI clinic for abd pain, reflux, and weight loss  Established with Gen [facility] with Dr Jyoti Conklin 6/2024, see note below. Seen with me 8/2024. Today is short term follow up on labs.     6/2024 appt with Dr Katerin Hou presents today for a video visit to discuss abdominal pain, acid reflux and weight loss.  Was found to have H. Pylori - was treated and abdominal pain and weight loss improved but heartburn has persisted.  Has also developed a pressure sensation in her abdomen that happens after eating or drinking coffee - feels it in her lower stomach and then goes all the way down to her lower abdomen. Also feels like she maybe constipated - has bowel movements but has a lot of pressure with them - this persists even after she goes.  Is taking stool softeners to have a bowel movement every day after imaging shows she was constipated but isn't working.     Hasn't been checked for eradication of H. Pylori - whenever she comes off her nexium she gets sick.    Did have x-rays which showed large stool burden - as did CT scan in 2017.    Thinks she has been gaining weight now after treatment for H. Pylori    Is very worried she might have cancer - understandably has a lot of anxiety over her recent illness    Today 9/30/24  She had sent a [facility] message last week requesting blood work  "as she continues to feel unwell.  A CBC with differential, CMP and lipase were checked.  Calcium was slightly low and hemoglobin level had dropped to the mid tens.  On a follow-up telephone call 9/26 she informed me that the burning upper abdominal pain has improved however still continues.  She did not have any more of the significant severe abdominal pain.  Her weight continues to uptrend.  Appetite has been stable.  No nausea or vomiting.  She denied bleeding in her stools or black stools.  She reported regular bowel movements.  We repeated the blood work to be drawn on 9/27 and included iron panels, ferritin and a repeat calcium.  She presents today to discuss these results  She reports she has been iron deficient in the past and states she was recommended to start iron however did not start it. She is on a very limited diet, eating PB sandwiches or junk food and drinking water.  She wonders if nutrition has a role to play in this?  -Does have IUD and so no periods or very scant bleeding    -she did have a nose ring and kept hitting it and so she had multiple nosebleeds.  She has removed the ring.   -she is fatigued with low energy and is losing hair .  No chest pain, shortness of breath.  She has stopped smoking/vaping for past 4 months   Weight is up. She is at 135#.   She is taking aciphex 20 mg BID and started famotidine 20 mg BID.  Continues with Gaviscon as needed which she feels is very helpful. The heartburn continues but she just started the famotidine the other day too.   No etoh or drugs. She is on suboxone   EGD got rescheduled to November.  She is very concerned she has cancer.     8/20/24    She is still having GI symptoms - states saw a urologist and reports \"the acid is going down to my bladder.\" States when she has bad acid reflux, her bladder burning pain also flares. This is an external [facility] - ?interstitial cystitis. States she was given a uro med but did not want to start it yet as " "she wanted to target the acid reflux and try for better control  Acid reflux is largest issue, daily breakthrough despite Nexium 40 mg BID + PRN gaviscon (not daily but does help). Tums does not help. Not on pepcid   -will have occ nausea with severe heartburn but denies emesis  -no dysphagia, odynphagia, melena, abdominal pain, unintentional weight loss   -drinks 2 cups of coffee daily - admits this does flare her acid sx  -tries to eat 3 meals daily, eating PB and J sandwiches only  -She feels stress is related to her reflux as well, single mom with 3 kids   -Sleeping upright for past 4 months otherwise she gets a rush of acid coming up throat (regurg)  -not on NSAIDs but did use excedrin daily x 2 years that she stopped 4-5 mo ago  -no etoh (last use 5y ago). No smoking (stopped in past 3-4mo)  -No known FHX gastric Ca, eso Ca  She feels her abd pain is better, not having \"rawness\" like previously.  She is pleased to report this.   Stooling - having 1 BM every 1-2d, endorses incomplete evacuation. Does strain. Not on bowel regiment.   She gained weight, up to 130#, was 107# previously. This is her normal weight.   Her largest goal today is to symptomatically manage her acid reflux  She has a EGD with Dr Conklin 10/29/24. A recent H.Pylori stool antigen was neg (she reports PPI was held 14d prior to this test).     ROS: 10pt ROS performed and otherwise negative.    PAST MEDICAL HISTORY:  No past medical history on file.    PREVIOUS ABDOMINAL/GYNECOLOGIC SURGERIES:  No past surgical history on file.      PERTINENT MEDICATIONS:  Current Outpatient Medications   Medication Sig Dispense Refill    citalopram (CELEXA) 20 MG tablet Take 1.5 tablets (30 mg) by mouth daily (Increase dose once you are able to lower your omeprazole). (Patient taking differently: Take 20 mg by mouth daily (Increase dose once you are able to lower your omeprazole).) 45 tablet 0    esomeprazole (NEXIUM) 40 MG DR capsule Take 1 capsule (40 mg) " by mouth 2 times daily Take 30-60 minutes before eating. 90 capsule 0    famotidine (PEPCID) 20 MG tablet Take 1 tablet (20 mg) by mouth 2 times daily 60 tablet 2    gabapentin (NEURONTIN) 800 MG tablet Take 300 mg by mouth daily      levonorgestrel (MIRENA) 20 mcg/24 hr (5 years) IUD [LEVONORGESTREL (MIRENA) 20 MCG/24 HR (5 YEARS) IUD] 1 each by Intrauterine route.      mirtazapine (REMERON) 45 MG tablet Take 45 mg by mouth At Bedtime      phenazopyridine (PYRIDIUM) 200 MG tablet Take 1 tablet (200 mg) by mouth 3 times daily as needed for irritation 20 tablet 0    polyethylene glycol (MIRALAX) 17 GM/Dose powder Take 17 g (1 Capful) by mouth 2 times daily 578 g 3    RABEprazole (ACIPHEX) 20 MG EC tablet Take 1 tablet (20 mg) by mouth 2 times daily 60 tablet 2    SUBOXONE 12-3 MG FILM per film Place 1 Film under the tongue 2 times daily      valACYclovir (VALTREX) 1000 mg tablet Take 1 tablet (1,000 mg) by mouth daily 90 tablet 2       SOCIAL HISTORY:    Social History     Socioeconomic History    Marital status: Single     Spouse name: Not on file    Number of children: Not on file    Years of education: Not on file    Highest education level: Not on file   Occupational History    Not on file   Tobacco Use    Smoking status: Former     Types: Cigarettes    Smokeless tobacco: Never    Tobacco comments:     6 per day   Vaping Use    Vaping status: Former   Substance and Sexual Activity    Alcohol use: Not on file    Drug use: Not on file    Sexual activity: Not on file   Other Topics Concern    Not on file   Social History Narrative    Not on file     Social Determinants of Health     Financial Resource Strain: Not At Risk (2/14/2024)    [facility]    Financial Resource Strain     Is it hard for you to pay for the very basics like food, housing, medical care or heating?: No   Food Insecurity: Not At Risk (2/14/2024)    [email or web address]    Food Insecurity      Does your food run out before you have the money to buy more?: No   Transportation Needs: Not At Risk (2/14/2024)    [facility]    Transportation Needs     Does a lack of transportation keep you from your medical appointments or from getting your medications?: No   Physical Activity: Not on file   Stress: Not on file   Social Connections: Not on file   Interpersonal Safety: Low Risk  (4/23/2024)    Interpersonal Safety     Do you feel physically and emotionally safe where you currently live?: Yes     Within the past 12 months, have you been hit, slapped, kicked or otherwise physically hurt by someone?: No     Within the past 12 months, have you been humiliated or emotionally abused in other ways by your partner or ex-partner?: No   Housing Stability: High Risk (10/2/2023)    Housing Stability     Do you have housing? : Yes     Are you worried about losing your housing?: Yes       FAMILY HISTORY:  No colon/panc/esophageal/other GI CA, no other Perez or other HPS-related Jess. No IBD/celiac, no other AI/liver/thyroid disease.  No family history on file.    PHYSICAL EXAMINATION:  Vitals reviewed: There were no vitals taken for this visit.  Wt:   Wt Readings from Last 2 Encounters:   07/30/24 59.9 kg (132 lb 1 oz)   07/18/24 57.7 kg (127 lb 2 oz)      Video physical exam  General: Patient does appear anxious  Skin: No visualized rash or lesions on visualized skin  Eyes: EOMI, no erythema, sclera icterus or discharge noted  Resp: Appears to be breathing comfortably without accessory muscle usage, speaking in full sentences, no cough  MSK: Appears to have normal range of motion based on visualized movements  Neurologic: No apparent tremors, facial movements symmetric  Psych: affect normal, alert and oriented    PERTINENT STUDIES - Reviewed in EMR     Lab Results   Component Value Date    WBC 3.8 (L) 09/27/2024    WBC 4.1 09/26/2024    WBC 2.7 (L) 04/16/2024    HGB 11.6 (L) 09/27/2024     HGB 10.7 (L) 09/26/2024    HGB 11.6 (L) 04/16/2024     09/27/2024     09/26/2024     04/16/2024    CHOL 165 11/27/2019    TRIG 114 11/27/2019    HDL 50 11/27/2019    ALT 15 09/26/2024    ALT 17 04/16/2024    ALT 20 03/19/2024    AST 23 09/26/2024    AST 19 04/16/2024    AST 21 03/19/2024     09/26/2024     04/16/2024     03/19/2024    BUN 11.0 09/26/2024    BUN 10.7 04/16/2024    BUN 13.7 03/19/2024    CO2 22 09/26/2024    CO2 26 04/16/2024    CO2 26 03/19/2024    TSH 0.91 08/14/2024    TSH 0.50 11/27/2019        Liver Function Studies -   Recent Labs   Lab Test 04/16/24  0851   PROTTOTAL 6.6   ALBUMIN 4.2   BILITOTAL 0.4   ALKPHOS 49   AST 19   ALT 17        PREVIOUS ENDOSCOPY    No results found for this or any previous visit.    ASSESSMENT/PLAN:  34 year old female with PMH of anxiety/depression presenting to GI clinic for acid reflux, h/o weight loss and abd pain.     # reflux/ heartburn  #regurg  #h/o abd pain, now much improved   #h/o weight loss, resolved   Sx are suggestive of unmanaged GERD / NERD likely compounded by undertreated constipation. Ddx includes comorbid functional disease, resolving gastritis (thought NSAID induced), gastroparesis, gastric outlet obstruction, atypical biliary disease vs other     Sx are improved since her last visit with us, only remaining sx include daily heartburn/reflux and regurg. No longer with significant abd pain and weight had increased. Still having some upper abdomen burning sensation which I reassured her can be normal.     plan  -order CTAP w/ contrast   -cont aciphex 20 mg BID  -cont famotidine 20 mg BID  -cont gaviscon PRN  -cont to avoid gastric irritants, advised to limit coffee   -EGD scheduled with Dr oCnklin 11/14/24 asked she hold PPI 10-14d prior to this scope . Will reach out to endo to see if we can move this scope up    Future consideration  -alternate PPI - dexilant; PCAB  -vonoprazan  -addition of alginate  -GI RD  consult  -GES     #h.pylori infection, h/o   Confirmed eradicated, she reports PPI was held 14d prior to stool test 8/15/24    RTC 1-2 weeks after EGD    Thank you for this consultation. It was a pleasure to participate in the care of this patient; please contact us with any further questions.    Yue Ramirez PA-C    Follow up: As planned above. Today, I personally spent 24 minutes in direct face to face time with the patient, of which greater than 50% of the time was spent in patient education and counseling as described above. Approximately 16 minutes were spent on indirect care associated with the patient's consultation including but not limited to review of: patient medical records to date, clinic visits, hospital records, lab results, imaging studies, procedural documentation, and coordinating care with other providers. The findings from this review are summarized in the above note. All of the above accounted for a cumulative time of 40 minutes and was performed on the date of service.

## 2024-09-30 NOTE — NURSING NOTE
Current patient location: [address] [place] [address] [address]  [facility] [address]    Is the patient currently in the state of [place]? YES    Visit mode:VIDEO    If the visit is dropped, the patient can be reconnected by: VIDEO VISIT: Text to cell phone:   Telephone Information:   Mobile [phone]       Will anyone else be joining the visit? NO  (If patient encounters technical issues they should call [phone]    How would you like to obtain your AVS? MyChart    Are changes needed to the allergy or medication list? No    Are refills needed on medications prescribed by this physician? NO    Rooming Documentation:  Not applicable    Reason for visit: RECHECK    Ev FORRESTERF

## 2024-10-16 ENCOUNTER — MEDICAL CORRESPONDENCE (OUTPATIENT)
Dept: HEALTH INFORMATION MANAGEMENT | Facility: CLINIC | Age: 34
End: 2024-10-16

## 2024-10-16 ENCOUNTER — TRANSFERRED RECORDS (OUTPATIENT)
Dept: HEALTH INFORMATION MANAGEMENT | Facility: CLINIC | Age: 34
End: 2024-10-16

## 2024-10-20 ENCOUNTER — MYC MEDICAL ADVICE (OUTPATIENT)
Dept: GASTROENTEROLOGY | Facility: CLINIC | Age: 34
End: 2024-10-20
Payer: COMMERCIAL

## 2024-10-22 ENCOUNTER — TELEPHONE (OUTPATIENT)
Dept: GASTROENTEROLOGY | Facility: CLINIC | Age: 34
End: 2024-10-22
Payer: COMMERCIAL

## 2024-10-22 NOTE — TELEPHONE ENCOUNTER
Caller:     Reason for Reschedule/Cancellation   (please be detailed, any staff messages or encounters to note?): WENT SOME WHERE ELSE      Prior to reschedule please review:  Ordering Provider:     DEMARIO UGARTE     Sedation Determined: CS  Does patient have any ASC Exclusions, please identify?:       Notes on Cancelled Procedure:  Procedure: Lower Endoscopy [Colonoscopy]   Date: 11/14  Location: [facility]; [address] [place] [address] [address], [place] [address]  Surgeon: PORFIRIO      Rescheduled: No,         Did you cancel or rescheduled an EUS procedure? No.

## 2024-11-19 ENCOUNTER — VIRTUAL VISIT (OUTPATIENT)
Dept: GASTROENTEROLOGY | Facility: CLINIC | Age: 34
End: 2024-11-19
Attending: PHYSICIAN ASSISTANT
Payer: COMMERCIAL

## 2024-11-19 VITALS — HEIGHT: 63 IN | BODY MASS INDEX: 24.1 KG/M2 | WEIGHT: 136 LBS

## 2024-11-19 DIAGNOSIS — R10.13 EPIGASTRIC PAIN: ICD-10-CM

## 2024-11-19 DIAGNOSIS — D50.9 IRON DEFICIENCY ANEMIA, UNSPECIFIED IRON DEFICIENCY ANEMIA TYPE: ICD-10-CM

## 2024-11-19 DIAGNOSIS — E61.1 IRON DEFICIENCY: ICD-10-CM

## 2024-11-19 DIAGNOSIS — R10.9 ABDOMINAL DISCOMFORT: ICD-10-CM

## 2024-11-19 DIAGNOSIS — R10.13 DYSPEPSIA: Primary | ICD-10-CM

## 2024-11-19 RX ORDER — SUCRALFATE ORAL 1 G/10ML
1 SUSPENSION ORAL 4 TIMES DAILY
Qty: 473 ML | Refills: 1 | Status: SHIPPED | OUTPATIENT
Start: 2024-11-19

## 2024-11-19 ASSESSMENT — PAIN SCALES - GENERAL: PAINLEVEL_OUTOF10: MODERATE PAIN (5)

## 2024-11-19 NOTE — NURSING NOTE
Current patient location: [address] [place] [address] [address]  [facility] [address]    Is the patient currently in the state of [place]? YES    Visit mode:VIDEO    If the visit is dropped, the patient can be reconnected by:VIDEO VISIT: Text to cell phone:   Telephone Information:   Mobile [phone]       Will anyone else be joining the visit? NO  (If patient encounters technical issues they should call [phone])    Are changes needed to the allergy or medication list? No    Are refills needed on medications prescribed by this physician? NO    Rooming Documentation:  Questionnaire(s) completed    Reason for visit: LACY HENDERSON

## 2024-11-19 NOTE — LETTER
11/19/2024      Savi Kramer  [address]  [facility] [address]      Dear Colleague,    Thank you for referring your patient, Savi Krmaer, to the [facility] [place]. Please see a copy of my visit note below.    Virtual Visit Details    Type of service:  Video Visit     Originating Location (pt. Location): Other in [place]    Distant Location (provider location):  Off-site  Platform used for Video Visit: Matilda    Joined the call at 11/19/2024, 2:29:49 pm.  Left the call at 11/19/2024, 2:51:09 pm.  You were on the call for 21 minutes 20 seconds     GI CLINIC VISIT    ASSESSMENT/PLAN:  34 year old female with PMH of anxiety/depression presenting to GI clinic for dyspepsia, history of weight loss, iron deficiency anemia.  She had a recent upper endoscopy and is here to review the results     # reflux/ heartburn  #regurg  #h/o abd pain, now much improved   # Dyspepsia  #h/o weight loss, resolved   EGD 10/16/2024 done off PPI (at least 14 days) overall macroscopically unremarkable.  Gastric biopsies showing reactive gastropathy without atrophy or H. pylori.  Duodenal biopsies were negative for celiac disease.  CTAP with contrast 10/20/2024 unremarkable; no evidence of obstruction or colonic inflammation.     After the EGD, the symptoms seem to have resolved with some time.  Given the scope findings, it is possible the pain that she had could have been reflective of reactive gastropathy (thought NSAID induced, no longer using), and comorbid functional pain.  Differential includes gastroparesis, atypical biliary disease versus other.     The symptoms resolved however recurred in the past couple days after she had started taking iron.  Iron can be hard on the stomach and cause a gastropathy.  Given her ongoing iron deficiency (Hgb 11.6, MCV 77.4, Ferritin 7), we will start her on IV iron.  Referral to [facility] pharmacy to assist with this.  Okay to stop oral iron supplements  now.  Advise start of Gaviscon.  Can try Carafate.  She is hesitant to try PPI or famotidine..      Future consideration  -alternate PPI - dexilant; PCAB  -vonoprazan  -addition of alginate  -GI RD consult  -GES      #h.pylori infection, h/o   4/10/2024 POS s/p pylera x 14d. Confirmed eradicated   Confirmed eradicated, she reports PPI was held 14d prior to stool test 8/15/24 and most recently on EGD (done on PPI holiday) 10/2024.     #DAV  Seen 2/2024 with Hgb 11.3, MCV 86.7, ferritin 8. Shares she was advised to start oral iron by care team but never did. Anemia continued, with lowest hgb to 10.7, MCV 78, ferritin 7.     -EGD 10/20/2024 with decrease rugae seen to the stomach, gastric biopsy with reactive gastropathy but no atrophy, intestinal metaplasia or H. Pylori.  Duodenal biopsies were negative for celiac disease  -H.pylori was POS 4/2024 but that had been successfully treated with confirmed eradication  -Celiac serologies and duodenal biopsies have been negative for celiac disease  -it's possible a component of dietary preferences are driving this anemia, shares she was on a very restrictive diet due only eating PB sandwiches and junk food.   -She has an IUD so has scant periods or no periods at all   -she is symptomatic, c/o fatigue, low endurance, hair loss  -Given poor tolerance to oral iron, switch to IV iron today    -We had ordered a colonoscopy for further evaluation of iron deficiency anemia.  She had not scheduled this yet but will plan to do so. If the hemoglobin levels normalize after iron replacement, we could consider cancellation of this procedure after risk versus benefits discussion.    Return in 3 to 4 months or sooner as needed    Thank you for this consultation. It was a pleasure to participate in the care of this patient; please contact us with any further questions.    Yue Ramirez PA-C  Code per complexity    HPI: 34 year old female with PMH of anxiety/depression presenting to GI clinic  "for abd pain, reflux, and weight loss  Seen with Dr Jyoti Conklin 6/2024 and most recently with me 9/30/2024.     6/2024 appt  Dr Jyoti Hou presents today for a video visit to discuss abdominal pain, acid reflux and weight loss.  Was found to have H. Pylori - was treated and abdominal pain and weight loss improved but heartburn has persisted.  Has also developed a pressure sensation in her abdomen that happens after eating or drinking coffee - feels it in her lower stomach and then goes all the way down to her lower abdomen. Also feels like she maybe constipated - has bowel movements but has a lot of pressure with them - this persists even after she goes.  Is taking stool softeners to have a bowel movement every day after imaging shows she was constipated but isn't working    Hasn't been checked for eradication of H. Pylori - whenever she comes off her nexium she gets sick.    Did have x-rays which showed large stool burden - as did CT scan in 2017.    Thinks she has been gaining weight now after treatment for H. Pylori    Is very worried she might have cancer - understandably has a lot of anxiety over her recent illness    8/20/24 appointment with me    She is still having GI symptoms - states saw a urologist and reports \"the acid is going down to my bladder.\" States when she has bad acid reflux, her bladder burning pain also flares. This is an external [facility] - ?interstitial cystitis. States she was given a uro med but did not want to start it yet as she wanted to target the acid reflux and try for better control  Acid reflux is largest issue, daily breakthrough despite Nexium 40 mg BID + PRN gaviscon (not daily but does help). Tums does not help. Not on pepcid   -will have occ nausea with severe heartburn but denies emesis  -no dysphagia, odynphagia, melena, abdominal pain, unintentional weight loss   -drinks 2 cups of coffee daily - admits this does flare her acid sx  -tries to eat 3 meals " "daily, eating PB and J sandwiches only  -She feels stress is related to her reflux as well, single mom with 3 kids   -Sleeping upright for past 4 months otherwise she gets a rush of acid coming up throat (regurg)  -not on NSAIDs but did use excedrin daily x 2 years that she stopped 4-5 mo ago  -no etoh (last use 5y ago). No smoking (stopped in past 3-4mo)  -No known FHX gastric Ca, eso Ca  She feels her abd pain is better, not having \"rawness\" like previously.  She is pleased to report this.   Stooling - having 1 BM every 1-2d, endorses incomplete evacuation. Does strain. Not on bowel regiment.   She gained weight, up to 130#, was 107# previously. This is her normal weight.   Her largest goal today is to symptomatically manage her acid reflux  She has a EGD with Dr Conklin 10/29/24. A recent H.Pylori stool antigen was neg (she reports PPI was held 14d prior to this test).     9/30/24 appointment with me  She had sent a [facility] message last week requesting blood work as she continues to feel unwell.  A CBC with differential, CMP and lipase were checked.  Calcium was slightly low and hemoglobin level had dropped to the mid tens.  On a follow-up telephone call 9/26 she informed me that the burning upper abdominal pain has improved however still continues.  She did not have any more of the significant severe abdominal pain.  Her weight continues to uptrend.  Appetite has been stable.  No nausea or vomiting.  She denied bleeding in her stools or black stools.  She reported regular bowel movements.  We repeated the blood work to be drawn on 9/27 and included iron panels, ferritin and a repeat calcium.  She presents today to discuss these results  She reports she has been iron deficient in the past and states she was recommended to start iron however did not start it. She is on a very limited diet, eating PB sandwiches or junk food and drinking water.  She wonders if nutrition has a role to play in this?  -Does have IUD " and so no periods or very scant bleeding    -she did have a nose ring and kept hitting it and so she had multiple nosebleeds.  She has removed the ring.   -she is fatigued with low energy and is losing hair .  No chest pain, shortness of breath.  She has stopped smoking/vaping for past 4 months   Weight is up. She is at 135#.   She is taking aciphex 20 mg BID and started famotidine 20 mg BID.  Continues with Gaviscon as needed which she feels is very helpful. The heartburn continues but she just started the famotidine the other day too.   No etoh or drugs. She is on suboxone   EGD got rescheduled to November.  She is very concerned she has cancer.     Today 11/19/24 appointment with me  She had been doing really well until the other day when her burning abdominal pain restarted.  This seems to have coincided when she started taking iron supplement every other day.  It is to the lower abdomen.  It is constant and it does affect her ability to eat and concentrate.  It is negatively affecting her quality of life.  She had been doing so well and is upset that her symptoms have worsened again.  Shares that she tried omeprazole, Nexium, Aciphex and when her pain was bad, feels that these medications actually made them worse.   -Since the start of iron, she is noticing dark green stools  -No fevers, chills, nausea, vomiting, melena, bloody stools  She previously was taking daily Excedrin for 2 years however had stopped that.  She was previously smoking however stopped that a few months ago as well.  She is not drinking any alcohol.  Dgt had watery stool and thinks she has is also coming down with a GI bug. Prior to that, was going daily, soft serve consistency no bloody stools or black stools.   No weight loss. Appetite is stable.   Did well with the EGD.  Shares that she was very worried about the sedation but found that she had no issues at all.  She has not scheduled a colonoscopy.  This was undertaken for further  evaluation of iron deficiency.  No other questions or concerns today    PAST MEDICAL HISTORY:  No past medical history on file.    PREVIOUS ABDOMINAL/GYNECOLOGIC SURGERIES:  No past surgical history on file.      PERTINENT MEDICATIONS:  Current Outpatient Medications   Medication Sig Dispense Refill     citalopram (CELEXA) 20 MG tablet Take 1.5 tablets (30 mg) by mouth daily (Increase dose once you are able to lower your omeprazole). 45 tablet 0     gabapentin (NEURONTIN) 800 MG tablet Take 300 mg by mouth daily       levonorgestrel (MIRENA) 20 mcg/24 hr (5 years) IUD [LEVONORGESTREL (MIRENA) 20 MCG/24 HR (5 YEARS) IUD] 1 each by Intrauterine route.       mirtazapine (REMERON) 45 MG tablet Take 45 mg by mouth At Bedtime       polyethylene glycol (MIRALAX) 17 GM/Dose powder Take 17 g (1 Capful) by mouth 2 times daily 578 g 3     SUBOXONE 12-3 MG FILM per film Place 1 Film under the tongue 2 times daily       valACYclovir (VALTREX) 1000 mg tablet Take 1 tablet (1,000 mg) by mouth daily 90 tablet 2     esomeprazole (NEXIUM) 40 MG DR capsule Take 1 capsule (40 mg) by mouth 2 times daily Take 30-60 minutes before eating. (Patient not taking: Reported on 11/19/2024) 90 capsule 0     famotidine (PEPCID) 20 MG tablet Take 1 tablet (20 mg) by mouth 2 times daily (Patient not taking: Reported on 11/19/2024) 60 tablet 2     ferrous gluconate (FERGON) 324 (38 Fe) MG tablet Take 1 tablet (324 mg) by mouth every other day. (Patient not taking: Reported on 11/19/2024) 45 tablet 1     phenazopyridine (PYRIDIUM) 200 MG tablet Take 1 tablet (200 mg) by mouth 3 times daily as needed for irritation (Patient not taking: Reported on 11/19/2024) 20 tablet 0     RABEprazole (ACIPHEX) 20 MG EC tablet Take 1 tablet (20 mg) by mouth 2 times daily (Patient not taking: Reported on 11/19/2024) 60 tablet 2       SOCIAL HISTORY:    Social History     Socioeconomic History     Marital status: Single     Spouse name: Not on file     Number of  "children: Not on file     Years of education: Not on file     Highest education level: Not on file   Occupational History     Not on file   Tobacco Use     Smoking status: Former     Types: Cigarettes     Smokeless tobacco: Never     Tobacco comments:     6 per day   Vaping Use     Vaping status: Former   Substance and Sexual Activity     Alcohol use: Not on file     Drug use: Not on file     Sexual activity: Not on file   Other Topics Concern     Not on file   Social History Narrative     Not on file     Social Drivers of Health     Financial Resource Strain: Not At Risk (2/14/2024)    Received from [employer]    Financial Resource Strain      Is it hard for you to pay for the very basics like food, housing, medical care or heating?: No   Food Insecurity: Not At Risk (2/14/2024)    Received from [employer]    Food Insecurity      Does your food run out before you have the money to buy more?: No   Transportation Needs: Not At Risk (2/14/2024)    Received from [employer]    Transportation Needs      Does a lack of transportation keep you from your medical appointments or from getting your medications?: No   Physical Activity: Not on file   Stress: Not on file   Social Connections: Not on file   Interpersonal Safety: Not At Risk (10/2/2024)    Received from [employer]    Humiliation, Afraid, Rape, and Kick questionnaire      Fear of Current or Ex-Partner: No      Emotionally Abused: No      Physically Abused: No      Sexually Abused: No   Housing Stability: High Risk (10/2/2023)    Housing Stability      Do you have housing? : Yes      Are you worried about losing your housing?: Yes       FAMILY HISTORY:  No colon/panc/esophageal/other GI CA, no other Perez or other HPS-related Jess. No IBD/celiac, no other AI/liver/thyroid disease.  No family history on file.    PHYSICAL EXAMINATION:  Vitals reviewed: Ht 1.593 m (5' 2.7\")   Wt 61.7 kg (136 lb)   BMI " 24.32 kg/m    Wt:   Wt Readings from Last 2 Encounters:   11/19/24 61.7 kg (136 lb)   07/30/24 59.9 kg (132 lb 1 oz)      Video physical exam  General: Patient appears anxious  Skin: No visualized rash or lesions on visualized skin  Eyes: EOMI, no erythema, sclera icterus or discharge noted  Resp: Appears to be breathing comfortably without accessory muscle usage, speaking in full sentences, no cough  MSK: Appears to have normal range of motion based on visualized movements  Neurologic: No apparent tremors, facial movements symmetric  Psych: affect anxious, alert and oriented    PERTINENT STUDIES - Reviewed in EMR     Lab Results   Component Value Date    WBC 3.8 (L) 09/27/2024    WBC 4.1 09/26/2024    WBC 2.7 (L) 04/16/2024    HGB 11.6 (L) 09/27/2024    HGB 10.7 (L) 09/26/2024    HGB 11.6 (L) 04/16/2024     09/27/2024     09/26/2024     04/16/2024    CHOL 165 11/27/2019    TRIG 114 11/27/2019    HDL 50 11/27/2019    ALT 15 09/26/2024    ALT 17 04/16/2024    ALT 20 03/19/2024    AST 23 09/26/2024    AST 19 04/16/2024    AST 21 03/19/2024     09/26/2024     04/16/2024     03/19/2024    BUN 11.0 09/26/2024    BUN 10.7 04/16/2024    BUN 13.7 03/19/2024    CO2 22 09/26/2024    CO2 26 04/16/2024    CO2 26 03/19/2024    TSH 0.91 08/14/2024    TSH 0.50 11/27/2019        Liver Function Studies -   Recent Labs   Lab Test 04/16/24  0851   PROTTOTAL 6.6   ALBUMIN 4.2   BILITOTAL 0.4   ALKPHOS 49   AST 19   ALT 17        PREVIOUS ENDOSCOPY    10/2024 EGD - Normal esophagus.  Esophageal biopsies were unremarkable.  Decreased rugae was seen throughout the entire stomach.  Gastric biopsy with findings of reactive gastropathy.  No signs of atrophy, inflammation, H. pylori.  Normal duodenum.  Duodenal biopsies were unremarkable and negative for celiac disease.      Again, thank you for allowing me to participate in the care of your patient.        Sincerely,        Yue Ramirez PA-C

## 2024-11-19 NOTE — PATIENT INSTRUCTIONS
It was a pleasure taking care of you today.  I've included a brief summary of our discussion and care plan from today's visit below.  Please review this information with your primary care provider.  _______________________________________________________________________    My recommendations are summarized as follows:    Stop oral iron. Will start IV iron. You will be contacted for appt with the medical therapy pharmacy team to discuss this start  OK to take gaviscon, per package instruction  OK to take carafate, I sent this to your pharmacy.   Schedule colonoscopy as discussed - call below Procedures line     Please call our clinic or send a [facility] message to us if you have any questions or concerns. [facility] messages are answered by your nurse or doctor typically within 24 hours.  Please allow extra time on weekends and holidays    Return to GI Clinic in 3-4 months to review your progress.    _______________________________________________________________________    How do I schedule labs, imaging studies, or procedures that were ordered in clinic today?      Labs: To schedule lab appointment at the [facility], use my chart or call [phone]. If you have a [place] lab closer to home where you are regularly seen you can give them a call.      Procedures: If a colonoscopy, upper endoscopy, breath test, esophageal manometry, or pH impedence was ordered today, our endoscopy team will call you to schedule this. If you have not heard from our endoscopy team within a week, please call [phone] option 2 to schedule.      Imaging Studies: If you were scheduled for a CT scan, X-ray, MRI, ultrasound, HIDA scan, EKG or other imaging study, please call [phone] to have this scheduled.      Referral: If a referral to another specialty was ordered, expect a phone call or follow instructions above. If you have not heard from anyone regarding your referral in a week, please call our clinic to check the  status.      Who do I call with any questions after my visit?  Please be in touch if there are any further questions that arise following today's visit.  There are multiple ways to contact your gastroenterology care team.       During business hours, you may reach a Gastroenterology nurse at [phone]     To schedule or reschedule an appointment, please call [phone].      You can always send a secure message through Evince.  Evince messages are answered by your nurse or doctor typically within 24 hours.  Please allow extra time on weekends and holidays.       For urgent/emergent questions after business hours, you may reach the on-call GI Fellow by contacting the [facility]  at [phone].     How will I get the results of any tests ordered?    You will receive all of your results.  If you have signed up for [employer], any tests ordered at your visit will be available to you after your physician reviews them.  Typically this takes 1-2 weeks.  If there are urgent results that require a change in your care plan, your physician or nurse will call you to discuss the next steps.       What is Evince?  Evince is a secure way for you to access all of your healthcare records from the [facility].  It is a web based computer program, so you can sign on to it from any location.  It also allows you to send secure messages to your care team.  I recommend signing up for Evince access if you have not already done so and are comfortable with using a computer.       How to I schedule a follow-up visit?  If you did not schedule a follow-up visit today, please call [phone] to schedule a follow-up office visit.      Sincerely,    Yue Ramirez PA-C  Gastroenterology

## 2024-11-19 NOTE — PROGRESS NOTES
Virtual Visit Details    Type of service:  Video Visit     Originating Location (pt. Location): Other in [place]    Distant Location (provider location):  Off-site  Platform used for Video Visit: Matilda    Joined the call at 11/19/2024, 2:29:49 pm.  Left the call at 11/19/2024, 2:51:09 pm.  You were on the call for 21 minutes 20 seconds     GI CLINIC VISIT    ASSESSMENT/PLAN:  34 year old female with PMH of anxiety/depression presenting to GI clinic for dyspepsia, history of weight loss, iron deficiency anemia.  She had a recent upper endoscopy and is here to review the results     # reflux/ heartburn  #regurg  #abd pain  # Dyspepsia  #h/o weight loss, resolved   EGD 10/16/2024 done off PPI (at least 14 days) overall macroscopically unremarkable.  Gastric biopsies showing reactive gastropathy without atrophy or H. pylori.  Duodenal biopsies were negative for celiac disease.  CTAP with contrast 10/20/2024 unremarkable; no evidence of obstruction or colonic inflammation.     After the EGD, the symptoms seem to have resolved with some time.  Given the scope findings, it is possible the pain that she had could have been reflective of reactive gastropathy (thought NSAID induced, no longer using), and comorbid functional pain.  Differential includes gastroparesis, atypical biliary disease versus other.     The symptoms resolved however recurred in the past couple days after she had started taking iron.  Iron can be hard on the stomach and cause a gastropathy.  Given her ongoing iron deficiency (Hgb 11.6, MCV 77.4, Ferritin 7), we will start her on IV iron.  Referral to [facility] pharmacy to assist with this.  Okay to stop oral iron supplements now.  Advise start of Gaviscon.  Can try Carafate.  She is hesitant to try PPI or famotidine..      Future consideration  -alternate PPI - dexilant; PCAB  -vonoprazan  -addition of alginate  -GI RD consult  -GES      #h.pylori infection, h/o   4/10/2024 POS s/p pylera x 14d. Confirmed  eradicated   Confirmed eradicated, she reports PPI was held 14d prior to stool test 8/15/24 and most recently on EGD (done on PPI holiday) 10/2024.     #DAV  Seen 2/2024 with Hgb 11.3, MCV 86.7, ferritin 8. Shares she was advised to start oral iron by care team but never did. Anemia continued, with lowest hgb to 10.7, MCV 78, ferritin 7.     -EGD 10/20/2024 with decrease rugae seen to the stomach, gastric biopsy with reactive gastropathy but no atrophy, intestinal metaplasia or H. Pylori.  Duodenal biopsies were negative for celiac disease  -H.pylori was POS 4/2024 but that had been successfully treated with confirmed eradication  -Celiac serologies and duodenal biopsies have been negative for celiac disease  -it's possible a component of dietary preferences are driving this anemia, shares she was on a very restrictive diet due only eating PB sandwiches and junk food.   -She has an IUD so has scant periods or no periods at all   -she is symptomatic, c/o fatigue, low endurance, hair loss  -Given poor tolerance to oral iron, switch to IV iron today    -We had ordered a colonoscopy for further evaluation of iron deficiency anemia.  She had not scheduled this yet but will plan to do so. If the hemoglobin levels normalize after iron replacement, we could consider cancellation of this procedure after risk versus benefits discussion.    Return in 3 to 4 months or sooner as needed    Thank you for this consultation. It was a pleasure to participate in the care of this patient; please contact us with any further questions.    Yue Ramirez PA-C  Code per complexity    HPI: 34 year old female with PMH of anxiety/depression presenting to GI clinic for abd pain, reflux, and weight loss  Seen with Dr Jyoti Conklin 6/2024 and most recently with me 9/30/2024.     6/2024 appt  Dr Jyoti Hou presents today for a video visit to discuss abdominal pain, acid reflux and weight loss.  Was found to have H. Pylori - was  "treated and abdominal pain and weight loss improved but heartburn has persisted.  Has also developed a pressure sensation in her abdomen that happens after eating or drinking coffee - feels it in her lower stomach and then goes all the way down to her lower abdomen. Also feels like she maybe constipated - has bowel movements but has a lot of pressure with them - this persists even after she goes.  Is taking stool softeners to have a bowel movement every day after imaging shows she was constipated but isn't working    Hasn't been checked for eradication of H. Pylori - whenever she comes off her nexium she gets sick.    Did have x-rays which showed large stool burden - as did CT scan in 2017.    Thinks she has been gaining weight now after treatment for H. Pylori    Is very worried she might have cancer - understandably has a lot of anxiety over her recent illness    8/20/24 appointment with me    She is still having GI symptoms - states saw a urologist and reports \"the acid is going down to my bladder.\" States when she has bad acid reflux, her bladder burning pain also flares. This is an external [facility] - ?interstitial cystitis. States she was given a uro med but did not want to start it yet as she wanted to target the acid reflux and try for better control  Acid reflux is largest issue, daily breakthrough despite Nexium 40 mg BID + PRN gaviscon (not daily but does help). Tums does not help. Not on pepcid   -will have occ nausea with severe heartburn but denies emesis  -no dysphagia, odynphagia, melena, abdominal pain, unintentional weight loss   -drinks 2 cups of coffee daily - admits this does flare her acid sx  -tries to eat 3 meals daily, eating PB and J sandwiches only  -She feels stress is related to her reflux as well, single mom with 3 kids   -Sleeping upright for past 4 months otherwise she gets a rush of acid coming up throat (regurg)  -not on NSAIDs but did use excedrin daily x 2 years that she " "stopped 4-5 mo ago  -no etoh (last use 5y ago). No smoking (stopped in past 3-4mo)  -No known FHX gastric Ca, eso Ca  She feels her abd pain is better, not having \"rawness\" like previously.  She is pleased to report this.   Stooling - having 1 BM every 1-2d, endorses incomplete evacuation. Does strain. Not on bowel regiment.   She gained weight, up to 130#, was 107# previously. This is her normal weight.   Her largest goal today is to symptomatically manage her acid reflux  She has a EGD with Dr Conklin 10/29/24. A recent H.Pylori stool antigen was neg (she reports PPI was held 14d prior to this test).     9/30/24 appointment with me  She had sent a [employer] message last week requesting blood work as she continues to feel unwell.  A CBC with differential, CMP and lipase were checked.  Calcium was slightly low and hemoglobin level had dropped to the mid tens.  On a follow-up telephone call 9/26 she informed me that the burning upper abdominal pain has improved however still continues.  She did not have any more of the significant severe abdominal pain.  Her weight continues to uptrend.  Appetite has been stable.  No nausea or vomiting.  She denied bleeding in her stools or black stools.  She reported regular bowel movements.  We repeated the blood work to be drawn on 9/27 and included iron panels, ferritin and a repeat calcium.  She presents today to discuss these results  She reports she has been iron deficient in the past and states she was recommended to start iron however did not start it. She is on a very limited diet, eating PB sandwiches or junk food and drinking water.  She wonders if nutrition has a role to play in this?  -Does have IUD and so no periods or very scant bleeding    -she did have a nose ring and kept hitting it and so she had multiple nosebleeds.  She has removed the ring.   -she is fatigued with low energy and is losing hair .  No chest pain, shortness of breath.  She has stopped smoking/vaping " for past 4 months   Weight is up. She is at 135#.   She is taking aciphex 20 mg BID and started famotidine 20 mg BID.  Continues with Gaviscon as needed which she feels is very helpful. The heartburn continues but she just started the famotidine the other day too.   No etoh or drugs. She is on suboxone   EGD got rescheduled to November.  She is very concerned she has cancer.     Today 11/19/24 appointment with me  She had been doing really well until the other day when her burning abdominal pain restarted.  This seems to have coincided when she started taking iron supplement every other day.  It is to the lower abdomen.  It is constant and it does affect her ability to eat and concentrate.  It is negatively affecting her quality of life.  She had been doing so well and is upset that her symptoms have worsened again.  Shares that she tried omeprazole, Nexium, Aciphex and when her pain was bad, feels that these medications actually made them worse.   -Since the start of iron, she is noticing dark green stools  -No fevers, chills, nausea, vomiting, melena, bloody stools  She previously was taking daily Excedrin for 2 years however had stopped that.  She was previously smoking however stopped that a few months ago as well.  She is not drinking any alcohol.  Dgt had watery stool and thinks she has is also coming down with a GI bug. Prior to that, was going daily, soft serve consistency no bloody stools or black stools.   No weight loss. Appetite is stable.   Did well with the EGD.  Shares that she was very worried about the sedation but found that she had no issues at all.  She has not scheduled a colonoscopy.  This was undertaken for further evaluation of iron deficiency.  No other questions or concerns today    PAST MEDICAL HISTORY:  No past medical history on file.    PREVIOUS ABDOMINAL/GYNECOLOGIC SURGERIES:  No past surgical history on file.      PERTINENT MEDICATIONS:  Current Outpatient Medications   Medication  Sig Dispense Refill    citalopram (CELEXA) 20 MG tablet Take 1.5 tablets (30 mg) by mouth daily (Increase dose once you are able to lower your omeprazole). 45 tablet 0    gabapentin (NEURONTIN) 800 MG tablet Take 300 mg by mouth daily      levonorgestrel (MIRENA) 20 mcg/24 hr (5 years) IUD [LEVONORGESTREL (MIRENA) 20 MCG/24 HR (5 YEARS) IUD] 1 each by Intrauterine route.      mirtazapine (REMERON) 45 MG tablet Take 45 mg by mouth At Bedtime      polyethylene glycol (MIRALAX) 17 GM/Dose powder Take 17 g (1 Capful) by mouth 2 times daily 578 g 3    SUBOXONE 12-3 MG FILM per film Place 1 Film under the tongue 2 times daily      valACYclovir (VALTREX) 1000 mg tablet Take 1 tablet (1,000 mg) by mouth daily 90 tablet 2    esomeprazole (NEXIUM) 40 MG DR capsule Take 1 capsule (40 mg) by mouth 2 times daily Take 30-60 minutes before eating. (Patient not taking: Reported on 11/19/2024) 90 capsule 0    famotidine (PEPCID) 20 MG tablet Take 1 tablet (20 mg) by mouth 2 times daily (Patient not taking: Reported on 11/19/2024) 60 tablet 2    ferrous gluconate (FERGON) 324 (38 Fe) MG tablet Take 1 tablet (324 mg) by mouth every other day. (Patient not taking: Reported on 11/19/2024) 45 tablet 1    phenazopyridine (PYRIDIUM) 200 MG tablet Take 1 tablet (200 mg) by mouth 3 times daily as needed for irritation (Patient not taking: Reported on 11/19/2024) 20 tablet 0    RABEprazole (ACIPHEX) 20 MG EC tablet Take 1 tablet (20 mg) by mouth 2 times daily (Patient not taking: Reported on 11/19/2024) 60 tablet 2       SOCIAL HISTORY:    Social History     Socioeconomic History    Marital status: Single     Spouse name: Not on file    Number of children: Not on file    Years of education: Not on file    Highest education level: Not on file   Occupational History    Not on file   Tobacco Use    Smoking status: Former     Types: Cigarettes    Smokeless tobacco: Never    Tobacco comments:     6 per day   Vaping Use    Vaping status: Former  "  Substance and Sexual Activity    Alcohol use: Not on file    Drug use: Not on file    Sexual activity: Not on file   Other Topics Concern    Not on file   Social History Narrative    Not on file     Social Drivers of Health     Financial Resource Strain: Not At Risk (2/14/2024)    Received from [facility]    Financial Resource Strain     Is it hard for you to pay for the very basics like food, housing, medical care or heating?: No   Food Insecurity: Not At Risk (2/14/2024)    Received from [facility]    Food Insecurity     Does your food run out before you have the money to buy more?: No   Transportation Needs: Not At Risk (2/14/2024)    Received from [facility]    Transportation Needs     Does a lack of transportation keep you from your medical appointments or from getting your medications?: No   Physical Activity: Not on file   Stress: Not on file   Social Connections: Not on file   Interpersonal Safety: Not At Risk (10/2/2024)    Received from [email or web address]    Humiliation, Afraid, Rape, and Kick questionnaire     Fear of Current or Ex-Partner: No     Emotionally Abused: No     Physically Abused: No     Sexually Abused: No   Housing Stability: High Risk (10/2/2023)    Housing Stability     Do you have housing? : Yes     Are you worried about losing your housing?: Yes       FAMILY HISTORY:  No colon/panc/esophageal/other GI CA, no other Perez or other HPS-related Jess. No IBD/celiac, no other AI/liver/thyroid disease.  No family history on file.    PHYSICAL EXAMINATION:  Vitals reviewed: Ht 1.593 m (5' 2.7\")   Wt 61.7 kg (136 lb)   BMI 24.32 kg/m    Wt:   Wt Readings from Last 2 Encounters:   11/19/24 61.7 kg (136 lb)   07/30/24 59.9 kg (132 lb 1 oz)      Video physical exam  General: Patient appears anxious  Skin: No visualized rash or lesions on visualized skin  Eyes: EOMI, no erythema, sclera icterus or discharge noted  Resp: Appears to be breathing " comfortably without accessory muscle usage, speaking in full sentences, no cough  MSK: Appears to have normal range of motion based on visualized movements  Neurologic: No apparent tremors, facial movements symmetric  Psych: affect anxious, alert and oriented    PERTINENT STUDIES - Reviewed in EMR     Lab Results   Component Value Date    WBC 3.8 (L) 09/27/2024    WBC 4.1 09/26/2024    WBC 2.7 (L) 04/16/2024    HGB 11.6 (L) 09/27/2024    HGB 10.7 (L) 09/26/2024    HGB 11.6 (L) 04/16/2024     09/27/2024     09/26/2024     04/16/2024    CHOL 165 11/27/2019    TRIG 114 11/27/2019    HDL 50 11/27/2019    ALT 15 09/26/2024    ALT 17 04/16/2024    ALT 20 03/19/2024    AST 23 09/26/2024    AST 19 04/16/2024    AST 21 03/19/2024     09/26/2024     04/16/2024     03/19/2024    BUN 11.0 09/26/2024    BUN 10.7 04/16/2024    BUN 13.7 03/19/2024    CO2 22 09/26/2024    CO2 26 04/16/2024    CO2 26 03/19/2024    TSH 0.91 08/14/2024    TSH 0.50 11/27/2019        Liver Function Studies -   Recent Labs   Lab Test 04/16/24  0851   PROTTOTAL 6.6   ALBUMIN 4.2   BILITOTAL 0.4   ALKPHOS 49   AST 19   ALT 17        PREVIOUS ENDOSCOPY    10/2024 EGD - Normal esophagus.  Esophageal biopsies were unremarkable.  Decreased rugae was seen throughout the entire stomach.  Gastric biopsy with findings of reactive gastropathy.  No signs of atrophy, inflammation, H. pylori.  Normal duodenum.  Duodenal biopsies were unremarkable and negative for celiac disease.

## 2024-11-20 DIAGNOSIS — R10.13 EPIGASTRIC PAIN: Primary | ICD-10-CM

## 2024-11-20 DIAGNOSIS — R10.9 ABDOMINAL DISCOMFORT: ICD-10-CM

## 2024-11-20 RX ORDER — SUCRALFATE 1 G/1
1 TABLET ORAL 4 TIMES DAILY
Qty: 120 TABLET | Refills: 0 | Status: SHIPPED | OUTPATIENT
Start: 2024-11-20

## 2024-11-26 ENCOUNTER — MYC MEDICAL ADVICE (OUTPATIENT)
Dept: GASTROENTEROLOGY | Facility: CLINIC | Age: 34
End: 2024-11-26
Payer: COMMERCIAL

## 2024-12-02 ENCOUNTER — TELEPHONE (OUTPATIENT)
Dept: GASTROENTEROLOGY | Facility: CLINIC | Age: 34
End: 2024-12-02
Payer: COMMERCIAL

## 2024-12-02 NOTE — TELEPHONE ENCOUNTER
Patient confirmed scheduled appointment:  Date: 2/4/25  Time: 2:30 pm  Visit type: return gi  Provider: Yue Ramirez  Location: virtual  Testing/imaging: NA  Additional notes: NA

## 2024-12-03 ENCOUNTER — TRANSFERRED RECORDS (OUTPATIENT)
Dept: HEALTH INFORMATION MANAGEMENT | Facility: CLINIC | Age: 34
End: 2024-12-03
Payer: COMMERCIAL

## 2024-12-11 DIAGNOSIS — R10.13 EPIGASTRIC PAIN: Primary | ICD-10-CM

## 2024-12-11 RX ORDER — ESOMEPRAZOLE MAGNESIUM 40 MG/1
40 CAPSULE, DELAYED RELEASE ORAL
Qty: 90 CAPSULE | Refills: 0 | Status: SHIPPED | OUTPATIENT
Start: 2024-12-11 | End: 2024-12-12

## 2024-12-12 ENCOUNTER — VIRTUAL VISIT (OUTPATIENT)
Dept: PHARMACY | Facility: CLINIC | Age: 34
End: 2024-12-12
Attending: PHYSICIAN ASSISTANT
Payer: COMMERCIAL

## 2024-12-12 VITALS — WEIGHT: 130 LBS | HEIGHT: 64 IN | BODY MASS INDEX: 22.2 KG/M2

## 2024-12-12 ASSESSMENT — PAIN SCALES - GENERAL: PAINLEVEL_OUTOF10: NO PAIN (0)

## 2024-12-12 NOTE — PATIENT INSTRUCTIONS
"Recommendations from today's MTM visit:                                                    MTM (medication therapy management) is a service provided by a clinical pharmacist designed to help you get the most of out of your medicines.        Initiate IV iron infusion therapy (Venofer).  Message sent to Lashanda King RN to input therapy plan for this.    Please send Spock message to Diana if you have any questions or concerns.    Follow-up: as needed.    It was great speaking with you today.  I value your experience and would be very thankful for your time in providing feedback in our clinic survey. In the next few days, you may receive an email or text message from [facility] with a link to a survey related to your  clinical pharmacist.\"     To schedule another MTM appointment, please call the clinic directly or you may call the MTM scheduling line at [phone].    My Clinical Pharmacist's contact information:                                                      Please feel free to contact me with any questions or concerns you have.      Diana Thompson, PharmD  MTM Pharmacist  [facility]    "

## 2024-12-12 NOTE — PROGRESS NOTES
Medication Therapy Management (MTM) Encounter    ASSESSMENT:                            Medication Adherence/Access: Unable to take oral iron supplementation as she cannot tolerate, every other day dosing causes abdominal pain.    Iron deficiency anemia:  Savi would benefit from IV iron infusions with Venofer. We reviewed Venofer today including an overview of the coverage and pharmacy process, mechanism of action, general dosing/administration, side effects (both common/serious), precautions, monitoring for safety and efficacy, as well as time to efficacy. Contact information provided in the event she has questions/concerns about the medication.       Reactive gastropathy:  Appropriate to continue esomeprazole, Gaviscon, sucralfate while Savi is symptomatic.  Could consider discontinuation/taper off in the future if symptoms resolve.    Recurrent herpes simplex:  Continue valacyclovir.  Assess for healing of cold sores/lesions after Savi is able to get IV iron infusion therapy for her iron deficiency.  Treating her iron deficiency may help with the healing process.     Depression/anxiety:  Appropriate to continue current medications.  Further assessment of mental health should be done following IV iron infusion therapy.  Resolution of iron deficiency symptoms such as hair loss and fatigue may improve mental health.    Chronic back pain/scoliosis:  Controlled, no medication changes needed at this time.    Restless leg syndrome:  Controlled, no medication changes needed at this time.    Contraception:  Controlled, no medication changes needed at this time.         PLAN:                            Initiate IV iron infusion therapy (Venofer).  Message sent to Lashanda King RN to input therapy plan for this.    Please send MyChart message to Diana if you have any questions or concerns.    Follow-up: as needed.    SUBJECTIVE/OBJECTIVE:                          Savi Kramer is a 34 year old female seen for an initial  visit. She was referred to me from Yue Ramirez PA-C.      Reason for visit: iron deficiency anemia - develops abdominal pain on every other day dosing of oral iron. Discuss IV iron infusions.    Allergies/ADRs: Reviewed in chart  Past Medical History: Reviewed in chart  Tobacco: She reports that she has quit smoking. Her smoking use included cigarettes. She has never used smokeless tobacco.  Alcohol: not currently using  Caffeine: drinks a cup of coffee twice daily - coffee + creamer. Does not drink soda.    Medication Adherence/Access: Abdominal pain with every other day oral iron dosing.     Iron deficiency anemia:   (Hgb 11.6, MCV 77.4, Ferritin 7)     Complains of fatigue, low endurance, and hair loss. She states she is losing a lot of hair which is distressing to her.     She notes that for a while, she has not been eating anything healthy, she was eating only cereal everyday. She is a picky eater so she has a hard time choosing more nutrient dense meals, though she states she has been trying to eat more meat recently because of her low iron.    Reactive gastropathy:  Esomeprazole 40 mg twice daily   Sucralfate 1 g four times daily   Gaviscon liquid - 30 mL twice daily    She states she had H. Pylori infection in the past and ever since then she can't get herself back to feeling normal. Has burning stomach pain. Has been taking Gaviscon twice daily for about 3 months. Recently started sucralfate, though she did not get full relief, so she re-starting PPI therapy. Has trialed esomeprazole 40 mg once daily and rabeprazole 20 mg twice daily. Just re-started taking esomeprazole a couple of days ago. She is now taking 40 mg twice daily.     The reactive gastropathy is thought to be NSAID induced. She states she used to take Excederin twice daily for about 2 years but stopped when she found out she had H. pylori infection. She is no longer taking any NSAIDs.     Recurrent herpes simplex:  Valacyclovir 1 g once daily  "    She states she has been taking valacyclovir 1 g 3 times daily for the past month.  She has facial cold sores and lesions in the genital region, these seem like they will start to heal and then they come right back in the same day.  We discussed that iron deficiency can affect healing negatively.    Depression/anxiety:  Citalopram 30 mg once daily   Mirtazapine 45 mg every night at bedtime     She states the medications work well but her mental health has suffered because of the current state of her physical health. Reports panic attacks twice a month - gets in her head about her physical health, thinks that \"something is so wrong that I must be dying\" when she loses big clumps of hair.    Chronic back pain/scoliosis:  Suboxone 12-3 mg film twice daily  States her chronic back pain caused her to have opiate dependency, but she has been off opiates for 5-6 years now. Reports that she no longer has back pain.    Restless leg syndrome:  Gabapentin 300 mg once daily  States she had opiate dependency and coming off of opiates caused her to have restless legs. She used to take 800 mg gabapentin twice daily and had been tapering down off of it.     Contraception:  Mirena IUD - no questions/concerns. No periods/little bleeding.      Today's Vitals: There were no vitals taken for this visit.  ----------------      I spent 35 minutes with this patient today. All changes were made via collaborative practice agreement with Yue Ramirez.     A summary of these recommendations was sent via [employer].    Telemedicine Visit Details  The patient's medications can be safely assessed via a telemedicine encounter.  Type of service:  Telephone visit  Originating Location (pt. Location): Other work    Distant Location (provider location):  On-site  Start Time:  1:38 PM  End Time: 2:13 PM     Medication Therapy Recommendations  No medication therapy recommendations to display     "

## 2024-12-12 NOTE — NURSING NOTE
Current patient location: [address] [address] [address]  [facility] [address]    Is the patient currently in the state of [place]? YES    Visit mode:TELEPHONE    If the visit is dropped, the patient can be reconnected by:TELEPHONE VISIT: Phone number:   Telephone Information:   Mobile [phone]       Will anyone else be joining the visit? NO  (If patient encounters technical issues they should call [phone]    Are changes needed to the allergy or medication list? No    Are refills needed on medications prescribed by this physician? NO    Rooming Documentation:  Questionnaire(s) not done per department protocol    Reason for visit: Consult    Nikkie HENDERSON

## 2024-12-13 PROBLEM — D50.9 ANEMIA, IRON DEFICIENCY: Status: ACTIVE | Noted: 2024-12-13

## 2024-12-16 ENCOUNTER — MYC MEDICAL ADVICE (OUTPATIENT)
Dept: GASTROENTEROLOGY | Facility: CLINIC | Age: 34
End: 2024-12-16
Payer: COMMERCIAL

## 2024-12-16 ENCOUNTER — MYC MEDICAL ADVICE (OUTPATIENT)
Dept: FAMILY MEDICINE | Facility: CLINIC | Age: 34
End: 2024-12-16
Payer: COMMERCIAL

## 2024-12-16 DIAGNOSIS — A04.8 H. PYLORI INFECTION: ICD-10-CM

## 2024-12-16 DIAGNOSIS — R10.13 DYSPEPSIA: Primary | ICD-10-CM

## 2024-12-16 DIAGNOSIS — R10.13 EPIGASTRIC PAIN: ICD-10-CM

## 2024-12-16 NOTE — TELEPHONE ENCOUNTER
OK for nexium 20 mg daily.   She should have an appt with primary for SOB.   She should go to ER if SOB does not resolve / it worsens   Thanks,  SY

## 2024-12-18 ENCOUNTER — OFFICE VISIT (OUTPATIENT)
Dept: FAMILY MEDICINE | Facility: CLINIC | Age: 34
End: 2024-12-18
Payer: COMMERCIAL

## 2024-12-18 VITALS
HEART RATE: 81 BPM | TEMPERATURE: 98.1 F | WEIGHT: 132.2 LBS | RESPIRATION RATE: 16 BRPM | BODY MASS INDEX: 22.57 KG/M2 | OXYGEN SATURATION: 97 % | DIASTOLIC BLOOD PRESSURE: 84 MMHG | SYSTOLIC BLOOD PRESSURE: 126 MMHG | HEIGHT: 64 IN

## 2024-12-18 DIAGNOSIS — A60.00 GENITAL HERPES SIMPLEX, UNSPECIFIED SITE: ICD-10-CM

## 2024-12-18 DIAGNOSIS — K13.0 ANGULAR CHEILITIS: ICD-10-CM

## 2024-12-18 DIAGNOSIS — B00.9 RECURRENT HERPES SIMPLEX: ICD-10-CM

## 2024-12-18 DIAGNOSIS — N94.9 GENITAL LESION, FEMALE: Primary | ICD-10-CM

## 2024-12-18 PROCEDURE — G2211 COMPLEX E/M VISIT ADD ON: HCPCS | Performed by: FAMILY MEDICINE

## 2024-12-18 PROCEDURE — 99214 OFFICE O/P EST MOD 30 MIN: CPT | Performed by: FAMILY MEDICINE

## 2024-12-18 RX ORDER — CLOTRIMAZOLE 1 %
CREAM (GRAM) TOPICAL 2 TIMES DAILY
Qty: 15 G | Refills: 1 | Status: SHIPPED | OUTPATIENT
Start: 2024-12-18

## 2024-12-18 RX ORDER — ACYCLOVIR 400 MG/1
400 TABLET ORAL EVERY 8 HOURS
Qty: 15 TABLET | Refills: 0 | Status: SHIPPED | OUTPATIENT
Start: 2024-12-18 | End: 2024-12-23

## 2024-12-18 RX ORDER — TRIAMCINOLONE ACETONIDE 0.25 MG/G
OINTMENT TOPICAL 2 TIMES DAILY
Qty: 15 G | Refills: 0 | Status: SHIPPED | OUTPATIENT
Start: 2024-12-18

## 2024-12-18 ASSESSMENT — ANXIETY QUESTIONNAIRES
8. IF YOU CHECKED OFF ANY PROBLEMS, HOW DIFFICULT HAVE THESE MADE IT FOR YOU TO DO YOUR WORK, TAKE CARE OF THINGS AT HOME, OR GET ALONG WITH OTHER PEOPLE?: EXTREMELY DIFFICULT
IF YOU CHECKED OFF ANY PROBLEMS ON THIS QUESTIONNAIRE, HOW DIFFICULT HAVE THESE PROBLEMS MADE IT FOR YOU TO DO YOUR WORK, TAKE CARE OF THINGS AT HOME, OR GET ALONG WITH OTHER PEOPLE: EXTREMELY DIFFICULT
7. FEELING AFRAID AS IF SOMETHING AWFUL MIGHT HAPPEN: NEARLY EVERY DAY
2. NOT BEING ABLE TO STOP OR CONTROL WORRYING: NEARLY EVERY DAY
6. BECOMING EASILY ANNOYED OR IRRITABLE: MORE THAN HALF THE DAYS
GAD7 TOTAL SCORE: 16
3. WORRYING TOO MUCH ABOUT DIFFERENT THINGS: NEARLY EVERY DAY
1. FEELING NERVOUS, ANXIOUS, OR ON EDGE: MORE THAN HALF THE DAYS
GAD7 TOTAL SCORE: 16
4. TROUBLE RELAXING: SEVERAL DAYS
GAD7 TOTAL SCORE: 16
7. FEELING AFRAID AS IF SOMETHING AWFUL MIGHT HAPPEN: NEARLY EVERY DAY
5. BEING SO RESTLESS THAT IT IS HARD TO SIT STILL: MORE THAN HALF THE DAYS

## 2024-12-18 ASSESSMENT — PATIENT HEALTH QUESTIONNAIRE - PHQ9
SUM OF ALL RESPONSES TO PHQ QUESTIONS 1-9: 9
SUM OF ALL RESPONSES TO PHQ QUESTIONS 1-9: 9
10. IF YOU CHECKED OFF ANY PROBLEMS, HOW DIFFICULT HAVE THESE PROBLEMS MADE IT FOR YOU TO DO YOUR WORK, TAKE CARE OF THINGS AT HOME, OR GET ALONG WITH OTHER PEOPLE: VERY DIFFICULT

## 2024-12-18 NOTE — ASSESSMENT & PLAN NOTE
History of HSV.  She presents today concerned about lesions in the lateral commissures of the mouth which have been present for about 7 months.  She also has a sense of discomfort in her pelvis which she associates with herpes genital flares.  She is iron deficient and has been working with GI.  Planned iron infusion later this month.  - We reviewed that the lesions on her lips are actually more likely to be inflammatory or fungal than HSV.  We will add topical triamcinolone and clotrimazole.  - Given that she is used valacyclovir frequently over the last 6 months, we will switch to acyclovir for her next burst/flare of HSV symptoms.

## 2024-12-18 NOTE — PROGRESS NOTES
"  Assessment & Plan   Problem List Items Addressed This Visit       Genital herpes     History of HSV.  She presents today concerned about lesions in the lateral commissures of the mouth which have been present for about 7 months.  She also has a sense of discomfort in her pelvis which she associates with herpes genital flares.  She is iron deficient and has been working with GI.  Planned iron infusion later this month.  - We reviewed that the lesions on her lips are actually more likely to be inflammatory or fungal than HSV.  We will add topical triamcinolone and clotrimazole.  - Given that she is used valacyclovir frequently over the last 6 months, we will switch to acyclovir for her next burst/flare of HSV symptoms.         Recurrent herpes simplex    Relevant Medications    acyclovir (ZOVIRAX) 400 MG tablet     Other Visit Diagnoses       Genital lesion, female    -  Primary    Relevant Medications    acyclovir (ZOVIRAX) 400 MG tablet    Other Relevant Orders    HC HSV 1&2 BY PCR SWAB    Angular cheilitis        Relevant Medications    triamcinolone (KENALOG) 0.025 % external ointment    clotrimazole (LOTRIMIN) 1 % external cream            The longitudinal plan of care for the diagnosis(es)/condition(s) as documented were addressed during this visit. Due to the added complexity in care, I will continue to support Savi in the subsequent management and with ongoing continuity of care.    Subjective   Savi is a 34 year old, presenting for the following health issues:  STD (Not responding to valcyclovir)        12/18/2024     7:32 AM   Additional Questions   Roomed by Je     Cold sore.  Not getting better for 7 months.  \"Immune system is low.\"   She has known HSV.  She can generally feel a pressure down low.   Angular chelitis?  She has but pn breva.    Known iron deficiciecy. Planned infusions later this month      History of Present Illness       Reason for visit:  Cold sore herpes out break    She eats 0-1 " "servings of fruits and vegetables daily.She consumes 2 sweetened beverage(s) daily.She exercises with enough effort to increase her heart rate 30 to 60 minutes per day.  She exercises with enough effort to increase her heart rate 4 days per week.   She is taking medications regularly.          Objective    /84 (BP Location: Left arm, Patient Position: Sitting, Cuff Size: Adult Regular)   Pulse 81   Temp 98.1  F (36.7  C) (Oral)   Resp 16   Ht 1.626 m (5' 4\")   Wt 60 kg (132 lb 3.2 oz)   SpO2 97%   BMI 22.69 kg/m    Body mass index is 22.69 kg/m .  Physical Exam  Nursing note reviewed.   Constitutional:       General: She is not in acute distress.     Appearance: Normal appearance. She is not ill-appearing.   HENT:      Head: Normocephalic and atraumatic.   Eyes:      Extraocular Movements: Extraocular movements intact.      Conjunctiva/sclera: Conjunctivae normal.   Pulmonary:      Effort: Pulmonary effort is normal.   Neurological:      Mental Status: She is alert and oriented to person, place, and time.   Psychiatric:         Attention and Perception: Attention normal.         Mood and Affect: Mood normal.         Speech: Speech normal.         Thought Content: Thought content normal.                    Signed Electronically by: Sang Redding MD    "

## 2024-12-20 ENCOUNTER — MYC MEDICAL ADVICE (OUTPATIENT)
Dept: FAMILY MEDICINE | Facility: CLINIC | Age: 34
End: 2024-12-20
Payer: COMMERCIAL

## 2024-12-20 DIAGNOSIS — N94.9 GENITAL LESION, FEMALE: ICD-10-CM

## 2024-12-20 DIAGNOSIS — B00.9 RECURRENT HERPES SIMPLEX: ICD-10-CM

## 2024-12-22 RX ORDER — ACYCLOVIR 400 MG/1
400 TABLET ORAL EVERY 12 HOURS
Qty: 60 TABLET | Refills: 0 | Status: SHIPPED | OUTPATIENT
Start: 2024-12-22

## 2024-12-30 ENCOUNTER — INFUSION THERAPY VISIT (OUTPATIENT)
Dept: INFUSION THERAPY | Facility: HOSPITAL | Age: 34
End: 2024-12-30
Payer: COMMERCIAL

## 2024-12-30 VITALS
DIASTOLIC BLOOD PRESSURE: 86 MMHG | RESPIRATION RATE: 16 BRPM | TEMPERATURE: 98.5 F | OXYGEN SATURATION: 97 % | SYSTOLIC BLOOD PRESSURE: 118 MMHG | HEART RATE: 68 BPM

## 2024-12-30 DIAGNOSIS — D50.9 ANEMIA, IRON DEFICIENCY: Primary | ICD-10-CM

## 2024-12-30 PROCEDURE — 258N000003 HC RX IP 258 OP 636: Performed by: PHYSICIAN ASSISTANT

## 2024-12-30 PROCEDURE — 96366 THER/PROPH/DIAG IV INF ADDON: CPT

## 2024-12-30 PROCEDURE — 250N000011 HC RX IP 250 OP 636: Performed by: PHYSICIAN ASSISTANT

## 2024-12-30 PROCEDURE — 96365 THER/PROPH/DIAG IV INF INIT: CPT

## 2024-12-30 RX ORDER — DIPHENHYDRAMINE HYDROCHLORIDE 50 MG/ML
50 INJECTION INTRAMUSCULAR; INTRAVENOUS
Status: DISCONTINUED | OUTPATIENT
Start: 2024-12-30 | End: 2024-12-30 | Stop reason: HOSPADM

## 2024-12-30 RX ORDER — MEPERIDINE HYDROCHLORIDE 25 MG/ML
25 INJECTION INTRAMUSCULAR; INTRAVENOUS; SUBCUTANEOUS
Status: CANCELLED | OUTPATIENT
Start: 2025-01-01

## 2024-12-30 RX ORDER — ALBUTEROL SULFATE 90 UG/1
1-2 INHALANT RESPIRATORY (INHALATION)
Status: CANCELLED
Start: 2025-01-01

## 2024-12-30 RX ORDER — ALBUTEROL SULFATE 90 UG/1
1-2 INHALANT RESPIRATORY (INHALATION)
Status: DISCONTINUED | OUTPATIENT
Start: 2024-12-30 | End: 2024-12-30 | Stop reason: HOSPADM

## 2024-12-30 RX ORDER — METHYLPREDNISOLONE SODIUM SUCCINATE 40 MG/ML
40 INJECTION INTRAMUSCULAR; INTRAVENOUS
Status: CANCELLED
Start: 2025-01-01

## 2024-12-30 RX ORDER — DIPHENHYDRAMINE HYDROCHLORIDE 50 MG/ML
25 INJECTION INTRAMUSCULAR; INTRAVENOUS
Status: CANCELLED
Start: 2025-01-01

## 2024-12-30 RX ORDER — DIPHENHYDRAMINE HYDROCHLORIDE 50 MG/ML
50 INJECTION INTRAMUSCULAR; INTRAVENOUS
Status: CANCELLED
Start: 2025-01-01

## 2024-12-30 RX ORDER — HEPARIN SODIUM,PORCINE 10 UNIT/ML
5-20 VIAL (ML) INTRAVENOUS DAILY PRN
Status: CANCELLED | OUTPATIENT
Start: 2025-01-01

## 2024-12-30 RX ORDER — EPINEPHRINE 1 MG/ML
0.3 INJECTION, SOLUTION INTRAMUSCULAR; SUBCUTANEOUS EVERY 5 MIN PRN
Status: DISCONTINUED | OUTPATIENT
Start: 2024-12-30 | End: 2024-12-30 | Stop reason: HOSPADM

## 2024-12-30 RX ORDER — ALBUTEROL SULFATE 0.83 MG/ML
2.5 SOLUTION RESPIRATORY (INHALATION)
Status: CANCELLED | OUTPATIENT
Start: 2025-01-01

## 2024-12-30 RX ORDER — DIPHENHYDRAMINE HYDROCHLORIDE 50 MG/ML
25 INJECTION INTRAMUSCULAR; INTRAVENOUS
Status: DISCONTINUED | OUTPATIENT
Start: 2024-12-30 | End: 2024-12-30 | Stop reason: HOSPADM

## 2024-12-30 RX ORDER — MEPERIDINE HYDROCHLORIDE 25 MG/ML
25 INJECTION INTRAMUSCULAR; INTRAVENOUS; SUBCUTANEOUS
Status: DISCONTINUED | OUTPATIENT
Start: 2024-12-30 | End: 2024-12-30 | Stop reason: HOSPADM

## 2024-12-30 RX ORDER — ALBUTEROL SULFATE 0.83 MG/ML
2.5 SOLUTION RESPIRATORY (INHALATION)
Status: DISCONTINUED | OUTPATIENT
Start: 2024-12-30 | End: 2024-12-30 | Stop reason: HOSPADM

## 2024-12-30 RX ORDER — HEPARIN SODIUM (PORCINE) LOCK FLUSH IV SOLN 100 UNIT/ML 100 UNIT/ML
5 SOLUTION INTRAVENOUS
Status: CANCELLED | OUTPATIENT
Start: 2025-01-01

## 2024-12-30 RX ORDER — METHYLPREDNISOLONE SODIUM SUCCINATE 40 MG/ML
40 INJECTION INTRAMUSCULAR; INTRAVENOUS
Status: DISCONTINUED | OUTPATIENT
Start: 2024-12-30 | End: 2024-12-30 | Stop reason: HOSPADM

## 2024-12-30 RX ORDER — EPINEPHRINE 1 MG/ML
0.3 INJECTION, SOLUTION INTRAMUSCULAR; SUBCUTANEOUS EVERY 5 MIN PRN
Status: CANCELLED | OUTPATIENT
Start: 2025-01-01

## 2024-12-30 RX ADMIN — IRON SUCROSE 300 MG: 20 INJECTION, SOLUTION INTRAVENOUS at 13:48

## 2024-12-30 RX ADMIN — SODIUM CHLORIDE 250 ML: 9 INJECTION, SOLUTION INTRAVENOUS at 13:32

## 2024-12-30 NOTE — PROGRESS NOTES
Infusion Nursing Note:  Savi Kramer presents today for #1/3 venofer 300 mg infusions.    Patient seen by provider today: No   present during visit today: Not Applicable.    Note: Savi arrived ambulatory in stable condition. Printed Venofer information given to and reviewed with patient. She verbalized understanding plan of care. Iron sucrose infused over 90 minutes followed with NS flush. She denies any side effects from the iron at this time. Savi is scheduled to return 1/2/25 for her second dose of venofer.      Intravenous Access:  Peripheral IV placed.    Treatment Conditions:  Not Applicable.      Post Infusion Assessment:  Patient tolerated infusion without incident.  Site patent and intact, free from redness, edema or discomfort.  Access discontinued per protocol.       Discharge Plan:   Patient discharged in stable condition accompanied by: self.  Departure Mode: Ambulatory.      Lula Miller RN

## 2024-12-31 ENCOUNTER — MYC REFILL (OUTPATIENT)
Dept: GASTROENTEROLOGY | Facility: CLINIC | Age: 34
End: 2024-12-31
Payer: COMMERCIAL

## 2024-12-31 DIAGNOSIS — R10.13 EPIGASTRIC PAIN: ICD-10-CM

## 2024-12-31 DIAGNOSIS — R10.9 ABDOMINAL DISCOMFORT: ICD-10-CM

## 2025-01-02 ENCOUNTER — INFUSION THERAPY VISIT (OUTPATIENT)
Dept: INFUSION THERAPY | Facility: HOSPITAL | Age: 35
End: 2025-01-02
Payer: COMMERCIAL

## 2025-01-02 VITALS
TEMPERATURE: 98 F | HEART RATE: 71 BPM | RESPIRATION RATE: 16 BRPM | OXYGEN SATURATION: 97 % | DIASTOLIC BLOOD PRESSURE: 89 MMHG | SYSTOLIC BLOOD PRESSURE: 123 MMHG

## 2025-01-02 DIAGNOSIS — D50.9 ANEMIA, IRON DEFICIENCY: Primary | ICD-10-CM

## 2025-01-02 PROCEDURE — 250N000011 HC RX IP 250 OP 636: Performed by: PHYSICIAN ASSISTANT

## 2025-01-02 PROCEDURE — 258N000003 HC RX IP 258 OP 636: Performed by: PHYSICIAN ASSISTANT

## 2025-01-02 RX ORDER — ALBUTEROL SULFATE 90 UG/1
1-2 INHALANT RESPIRATORY (INHALATION)
Start: 2025-01-03

## 2025-01-02 RX ORDER — SUCRALFATE 1 G/1
1 TABLET ORAL 4 TIMES DAILY
Qty: 120 TABLET | Refills: 0 | Status: SHIPPED | OUTPATIENT
Start: 2025-01-02

## 2025-01-02 RX ORDER — ALBUTEROL SULFATE 90 UG/1
1-2 INHALANT RESPIRATORY (INHALATION)
Status: DISCONTINUED | OUTPATIENT
Start: 2025-01-02 | End: 2025-01-02 | Stop reason: HOSPADM

## 2025-01-02 RX ORDER — DIPHENHYDRAMINE HYDROCHLORIDE 50 MG/ML
50 INJECTION INTRAMUSCULAR; INTRAVENOUS
Start: 2025-01-03

## 2025-01-02 RX ORDER — HEPARIN SODIUM (PORCINE) LOCK FLUSH IV SOLN 100 UNIT/ML 100 UNIT/ML
5 SOLUTION INTRAVENOUS
OUTPATIENT
Start: 2025-01-03

## 2025-01-02 RX ORDER — MEPERIDINE HYDROCHLORIDE 25 MG/ML
25 INJECTION INTRAMUSCULAR; INTRAVENOUS; SUBCUTANEOUS
Status: DISCONTINUED | OUTPATIENT
Start: 2025-01-02 | End: 2025-01-02 | Stop reason: HOSPADM

## 2025-01-02 RX ORDER — MEPERIDINE HYDROCHLORIDE 25 MG/ML
25 INJECTION INTRAMUSCULAR; INTRAVENOUS; SUBCUTANEOUS
OUTPATIENT
Start: 2025-01-03

## 2025-01-02 RX ORDER — DIPHENHYDRAMINE HYDROCHLORIDE 50 MG/ML
25 INJECTION INTRAMUSCULAR; INTRAVENOUS
Status: DISCONTINUED | OUTPATIENT
Start: 2025-01-02 | End: 2025-01-02 | Stop reason: HOSPADM

## 2025-01-02 RX ORDER — METHYLPREDNISOLONE SODIUM SUCCINATE 40 MG/ML
40 INJECTION INTRAMUSCULAR; INTRAVENOUS
Status: DISCONTINUED | OUTPATIENT
Start: 2025-01-02 | End: 2025-01-02 | Stop reason: HOSPADM

## 2025-01-02 RX ORDER — ALBUTEROL SULFATE 0.83 MG/ML
2.5 SOLUTION RESPIRATORY (INHALATION)
OUTPATIENT
Start: 2025-01-03

## 2025-01-02 RX ORDER — HEPARIN SODIUM,PORCINE 10 UNIT/ML
5-20 VIAL (ML) INTRAVENOUS DAILY PRN
OUTPATIENT
Start: 2025-01-03

## 2025-01-02 RX ORDER — ALBUTEROL SULFATE 0.83 MG/ML
2.5 SOLUTION RESPIRATORY (INHALATION)
Status: DISCONTINUED | OUTPATIENT
Start: 2025-01-02 | End: 2025-01-02 | Stop reason: HOSPADM

## 2025-01-02 RX ORDER — METHYLPREDNISOLONE SODIUM SUCCINATE 40 MG/ML
40 INJECTION INTRAMUSCULAR; INTRAVENOUS
Start: 2025-01-03

## 2025-01-02 RX ORDER — EPINEPHRINE 1 MG/ML
0.3 INJECTION, SOLUTION INTRAMUSCULAR; SUBCUTANEOUS EVERY 5 MIN PRN
OUTPATIENT
Start: 2025-01-03

## 2025-01-02 RX ORDER — DIPHENHYDRAMINE HYDROCHLORIDE 50 MG/ML
25 INJECTION INTRAMUSCULAR; INTRAVENOUS
Start: 2025-01-03

## 2025-01-02 RX ORDER — DIPHENHYDRAMINE HYDROCHLORIDE 50 MG/ML
50 INJECTION INTRAMUSCULAR; INTRAVENOUS
Status: DISCONTINUED | OUTPATIENT
Start: 2025-01-02 | End: 2025-01-02 | Stop reason: HOSPADM

## 2025-01-02 RX ORDER — EPINEPHRINE 1 MG/ML
0.3 INJECTION, SOLUTION INTRAMUSCULAR; SUBCUTANEOUS EVERY 5 MIN PRN
Status: DISCONTINUED | OUTPATIENT
Start: 2025-01-02 | End: 2025-01-02 | Stop reason: HOSPADM

## 2025-01-02 RX ADMIN — IRON SUCROSE 300 MG: 20 INJECTION, SOLUTION INTRAVENOUS at 09:17

## 2025-01-02 RX ADMIN — SODIUM CHLORIDE 250 ML: 9 INJECTION, SOLUTION INTRAVENOUS at 09:16

## 2025-01-02 ASSESSMENT — PAIN SCALES - GENERAL: PAINLEVEL_OUTOF10: NO PAIN (0)

## 2025-01-02 NOTE — PROGRESS NOTES
Infusion Nursing Note:  Savi Kramer presents today for Venofer.    Patient seen by provider today: No   present during visit today: Not Applicable.    Note: Savi arrived into the infusion center ambulatory in a stable condition for Venfer 300 mg, VSS. Plan of care reviewed, she verbalized understanding of her plan of care and return to clinic for the last dose. She stated she tolerated the 1st dose with no reaction or ill effect noted.    Intravenous Access:  Peripheral IV placed.    Treatment Conditions:  Not Applicable.    Post Infusion Assessment:  Patient tolerated infusion without incident.  Site patent and intact, free from redness, edema or discomfort.  No evidence of extravasations.  Access discontinued per protocol.     Discharge Plan:   Discharge instructions reviewed with: Patient.  Patient and/or family verbalized understanding of discharge instructions and all questions answered.  Patient discharged in stable condition accompanied by: self.  Departure Mode: Ambulatory.    Obdulia Viera RN

## 2025-01-07 ENCOUNTER — INFUSION THERAPY VISIT (OUTPATIENT)
Dept: INFUSION THERAPY | Facility: HOSPITAL | Age: 35
End: 2025-01-07
Attending: PHYSICIAN ASSISTANT
Payer: COMMERCIAL

## 2025-01-07 VITALS
OXYGEN SATURATION: 98 % | RESPIRATION RATE: 16 BRPM | DIASTOLIC BLOOD PRESSURE: 75 MMHG | TEMPERATURE: 98 F | HEART RATE: 66 BPM | SYSTOLIC BLOOD PRESSURE: 110 MMHG

## 2025-01-07 DIAGNOSIS — D50.9 ANEMIA, IRON DEFICIENCY: Primary | ICD-10-CM

## 2025-01-07 PROCEDURE — 96365 THER/PROPH/DIAG IV INF INIT: CPT

## 2025-01-07 PROCEDURE — 250N000011 HC RX IP 250 OP 636: Performed by: PHYSICIAN ASSISTANT

## 2025-01-07 PROCEDURE — 96366 THER/PROPH/DIAG IV INF ADDON: CPT

## 2025-01-07 PROCEDURE — 258N000003 HC RX IP 258 OP 636: Performed by: PHYSICIAN ASSISTANT

## 2025-01-07 RX ORDER — ALBUTEROL SULFATE 90 UG/1
1-2 INHALANT RESPIRATORY (INHALATION)
Status: CANCELLED
Start: 2025-01-08

## 2025-01-07 RX ORDER — ALBUTEROL SULFATE 90 UG/1
1-2 INHALANT RESPIRATORY (INHALATION)
Status: DISCONTINUED | OUTPATIENT
Start: 2025-01-07 | End: 2025-01-07

## 2025-01-07 RX ORDER — MEPERIDINE HYDROCHLORIDE 25 MG/ML
25 INJECTION INTRAMUSCULAR; INTRAVENOUS; SUBCUTANEOUS
Status: CANCELLED | OUTPATIENT
Start: 2025-01-08

## 2025-01-07 RX ORDER — DIPHENHYDRAMINE HYDROCHLORIDE 50 MG/ML
50 INJECTION INTRAMUSCULAR; INTRAVENOUS
Status: CANCELLED
Start: 2025-01-08

## 2025-01-07 RX ORDER — HEPARIN SODIUM (PORCINE) LOCK FLUSH IV SOLN 100 UNIT/ML 100 UNIT/ML
5 SOLUTION INTRAVENOUS
Status: CANCELLED | OUTPATIENT
Start: 2025-01-08

## 2025-01-07 RX ORDER — EPINEPHRINE 1 MG/ML
0.3 INJECTION, SOLUTION INTRAMUSCULAR; SUBCUTANEOUS EVERY 5 MIN PRN
Status: CANCELLED | OUTPATIENT
Start: 2025-01-08

## 2025-01-07 RX ORDER — DIPHENHYDRAMINE HYDROCHLORIDE 50 MG/ML
25 INJECTION INTRAMUSCULAR; INTRAVENOUS
Status: DISCONTINUED | OUTPATIENT
Start: 2025-01-07 | End: 2025-01-07

## 2025-01-07 RX ORDER — EPINEPHRINE 1 MG/ML
0.3 INJECTION, SOLUTION INTRAMUSCULAR; SUBCUTANEOUS EVERY 5 MIN PRN
Status: DISCONTINUED | OUTPATIENT
Start: 2025-01-07 | End: 2025-01-07

## 2025-01-07 RX ORDER — MEPERIDINE HYDROCHLORIDE 25 MG/ML
25 INJECTION INTRAMUSCULAR; INTRAVENOUS; SUBCUTANEOUS
Status: DISCONTINUED | OUTPATIENT
Start: 2025-01-07 | End: 2025-01-07

## 2025-01-07 RX ORDER — DIPHENHYDRAMINE HYDROCHLORIDE 50 MG/ML
50 INJECTION INTRAMUSCULAR; INTRAVENOUS
Status: DISCONTINUED | OUTPATIENT
Start: 2025-01-07 | End: 2025-01-07

## 2025-01-07 RX ORDER — METHYLPREDNISOLONE SODIUM SUCCINATE 40 MG/ML
40 INJECTION INTRAMUSCULAR; INTRAVENOUS
Status: CANCELLED
Start: 2025-01-08

## 2025-01-07 RX ORDER — METHYLPREDNISOLONE SODIUM SUCCINATE 40 MG/ML
40 INJECTION INTRAMUSCULAR; INTRAVENOUS
Status: DISCONTINUED | OUTPATIENT
Start: 2025-01-07 | End: 2025-01-07

## 2025-01-07 RX ORDER — ALBUTEROL SULFATE 0.83 MG/ML
2.5 SOLUTION RESPIRATORY (INHALATION)
Status: DISCONTINUED | OUTPATIENT
Start: 2025-01-07 | End: 2025-01-07

## 2025-01-07 RX ORDER — ALBUTEROL SULFATE 0.83 MG/ML
2.5 SOLUTION RESPIRATORY (INHALATION)
Status: CANCELLED | OUTPATIENT
Start: 2025-01-08

## 2025-01-07 RX ORDER — HEPARIN SODIUM,PORCINE 10 UNIT/ML
5-20 VIAL (ML) INTRAVENOUS DAILY PRN
Status: CANCELLED | OUTPATIENT
Start: 2025-01-08

## 2025-01-07 RX ORDER — DIPHENHYDRAMINE HYDROCHLORIDE 50 MG/ML
25 INJECTION INTRAMUSCULAR; INTRAVENOUS
Status: CANCELLED
Start: 2025-01-08

## 2025-01-07 RX ADMIN — IRON SUCROSE 300 MG: 20 INJECTION, SOLUTION INTRAVENOUS at 10:05

## 2025-01-07 RX ADMIN — SODIUM CHLORIDE 250 ML: 9 INJECTION, SOLUTION INTRAVENOUS at 10:05

## 2025-01-07 ASSESSMENT — PAIN SCALES - GENERAL: PAINLEVEL_OUTOF10: NO PAIN (0)

## 2025-01-07 NOTE — PROGRESS NOTES
Infusion Nursing Note:  Savi Kramer presents today for Venofer Dose 3.    Patient seen by provider today: No   present during visit today: Not Applicable.    Note:  Savi arrived into the infusion center ambulatory in a stable condition for Venfer 300 mg, VSS. Plan of care reviewed, she verbalized understanding of her plan of care and has tolerated Iron so far with no reaction or ill effect noted.  .    Intravenous Access:  Peripheral IV placed.    Treatment Conditions:  Not Applicable.      Post Infusion Assessment:  Patient tolerated infusion without incident.  Site patent and intact, free from redness, edema or discomfort.  No evidence of extravasations.  Access discontinued per protocol.     Discharge Plan:   Discharge instructions reviewed with: Patient.  Patient and/or family verbalized understanding of discharge instructions and all questions answered.  Patient discharged in stable condition accompanied by: self.  Departure Mode: Ambulatory.    Obdulia Viera RN

## 2025-01-21 DIAGNOSIS — R10.13 EPIGASTRIC PAIN: Primary | ICD-10-CM

## 2025-01-21 DIAGNOSIS — R12 HEARTBURN: ICD-10-CM

## 2025-01-21 RX ORDER — ESOMEPRAZOLE MAGNESIUM 40 MG/1
40 CAPSULE, DELAYED RELEASE ORAL 2 TIMES DAILY
Qty: 90 CAPSULE | Refills: 0 | Status: SHIPPED | OUTPATIENT
Start: 2025-01-21

## 2025-01-27 DIAGNOSIS — D50.9 ANEMIA, IRON DEFICIENCY: Primary | ICD-10-CM

## 2025-02-04 ENCOUNTER — VIRTUAL VISIT (OUTPATIENT)
Dept: GASTROENTEROLOGY | Facility: CLINIC | Age: 35
End: 2025-02-04
Attending: PHYSICIAN ASSISTANT
Payer: COMMERCIAL

## 2025-02-04 DIAGNOSIS — R10.13 DYSPEPSIA: Primary | ICD-10-CM

## 2025-02-04 DIAGNOSIS — D72.819 LEUKOPENIA, UNSPECIFIED TYPE: ICD-10-CM

## 2025-02-04 DIAGNOSIS — K59.00 CONSTIPATION, UNSPECIFIED CONSTIPATION TYPE: ICD-10-CM

## 2025-02-04 DIAGNOSIS — D50.9 IRON DEFICIENCY ANEMIA, UNSPECIFIED IRON DEFICIENCY ANEMIA TYPE: ICD-10-CM

## 2025-02-04 NOTE — NURSING NOTE
Current patient location: [address] [place] [address] [address]  [facility] [address]    Is the patient currently in the state of [place]? YES    Visit mode: VIDEO    If the visit is dropped, the patient can be reconnected by:VIDEO VISIT: Text to cell phone:   Telephone Information:   Mobile [phone]       Will anyone else be joining the visit? NO  (If patient encounters technical issues they should call [phone]    Are changes needed to the allergy or medication list? No    Are refills needed on medications prescribed by this physician? NO    Rooming Documentation:  Questionnaire(s) completed    Reason for visit: RECHECK    Bhaskar HENDERSON

## 2025-02-04 NOTE — LETTER
2/4/2025      Savi Kramer  [address]  [facility] [address]      Dear Colleague,    Thank you for referring your patient, Savi Kramer, to the [facility] [place]. Please see a copy of my visit note below.    Virtual Visit Details    Type of service:  Video Visit     Originating Location [place]    Distant Location (provider location):  Off-site  Platform used for Video Visit: [facility]      GI CLINIC VISIT    ASSESSMENT/PLAN:  34 year old female with PMH of anxiety/depression presenting to GI clinic for dyspepsia, history of weight loss, iron deficiency anemia.  She had a recent upper endoscopy and is here to review the results     # reflux/ heartburn  #regurg  #abd pain  # Dyspepsia  #h/o weight loss, resolved   EGD 10/16/2024 done off PPI (at least 14 days) overall macroscopically unremarkable.  Gastric biopsies showing reactive gastropathy without atrophy or H. pylori.  Duodenal biopsies were negative for celiac disease.  CTAP with contrast 10/20/2024 unremarkable; no evidence of obstruction or colonic inflammation.     After the EGD, the symptoms resolved with some time.  Given the scope findings, it is possible the pain that she had could have been reflective of reactive gastropathy (thought NSAID induced, no longer using), and comorbid functional pain.  Differential includes gastroparesis, atypical biliary disease versus other.     She had a slight return of these sx with use of PO iron and they continued despite stopping the supplements. PO carafate has been helpful, I asked she try decreasing carfate use (TID currently) to see how she responds. Goal is lowest effective dose. She tried PPI and got SE on med. Tried famotidine without good results.     Future consideration  -alternate PPI - dexilant; PCAB  -vonoprazan  -addition of alginate  -GI RD consult  -GES      #h.pylori infection, h/o   4/10/2024 POS s/p pylera x 14d. Confirmed  eradicated   Confirmed eradicated, she reports PPI was held 14d prior to stool test 8/15/24 and most recently on EGD (done on PPI holiday) 10/2024.     #DAV, h/o   lowest hgb to 10.7, MCV 78, ferritin 7 now s/p IV iron infusion with Hgb now in 14s and ferritin 115. She feels a lot better after IV iron infusion     -EGD 10/20/2024 with decrease rugae seen to the stomach, gastric biopsy with reactive gastropathy but no atrophy, intestinal metaplasia or H. Pylori.  Duodenal biopsies were negative for celiac disease  -Cscope 12/2024 - ileal intubation with poor prep, normal macro colon. Findings of hemorrhoids. No polyps taken or evidence of bleeding under visualization.   -H.pylori was POS 4/2024 but that had been successfully treated with confirmed eradication  -Duodenal biopsies have been negative for celiac disease  -it's possible a component of dietary preferences could be driving this anemia, shares she was on a very restrictive diet due only eating PB sandwiches and junk food.   -She has an IUD so has scant periods or no periods at all     -prior DAV could have been driven by h.pylori infection (now eradicated) and poor dietary choices. No longer anemic, will monitor along with care team     #stooling  Trouble with constipation. Metabolic and TSH WNL. Duodenal Bx NEG for celiac disease. Bidirectional scopes done. Likely driven by diet / lifestyle and med induced SE (mirtazepine, gabapentin, suboxone) with ddx to include functional constipation vs other.    -daily magnesium recommended, Rx'd . Renal functional WNL (1/22/2025)  Future consideration includes secretagogue trial given daily suboxone use     #leukopenia  Seen on recent CBC, she suffers from recurrent genital herpes outbreaks. This could certainly lead to leukopenia. I asked she follow this finding with her primary in a few months.     Return in 6 months or sooner as needed    Thank you for this consultation. It was a pleasure to participate in the care  "of this patient; please contact us with any further questions.    Yue Ramirez PA-C  Code per complexity    HPI: 34 year old female with PMH of anxiety/depression presenting to GI clinic for abd pain, reflux, and weight loss  Seen with Dr Jyoti Conklin 6/2024 and most recently with me 9/30/2024.     6/2024 appt  Dr Jyoti Conklin   Savi presents today for a video visit to discuss abdominal pain, acid reflux and weight loss.  Was found to have H. Pylori - was treated and abdominal pain and weight loss improved but heartburn has persisted.  Has also developed a pressure sensation in her abdomen that happens after eating or drinking coffee - feels it in her lower stomach and then goes all the way down to her lower abdomen. Also feels like she maybe constipated - has bowel movements but has a lot of pressure with them - this persists even after she goes.  Is taking stool softeners to have a bowel movement every day after imaging shows she was constipated but isn't working    Hasn't been checked for eradication of H. Pylori - whenever she comes off her nexium she gets sick.    Did have x-rays which showed large stool burden - as did CT scan in 2017.    Thinks she has been gaining weight now after treatment for H. Pylori    Is very worried she might have cancer - understandably has a lot of anxiety over her recent illness    8/20/24 appointment with me    She is still having GI symptoms - states saw a urologist and reports \"the acid is going down to my bladder.\" States when she has bad acid reflux, her bladder burning pain also flares. This is an external [facility] - ?interstitial cystitis. States she was given a uro med but did not want to start it yet as she wanted to target the acid reflux and try for better control  Acid reflux is largest issue, daily breakthrough despite Nexium 40 mg BID + PRN gaviscon (not daily but does help). Tums does not help. Not on pepcid   -will have occ nausea with severe heartburn but " "denies emesis  -no dysphagia, odynphagia, melena, abdominal pain, unintentional weight loss   -drinks 2 cups of coffee daily - admits this does flare her acid sx  -tries to eat 3 meals daily, eating PB and J sandwiches only  -She feels stress is related to her reflux as well, single mom with 3 kids   -Sleeping upright for past 4 months otherwise she gets a rush of acid coming up throat (regurg)  -not on NSAIDs but did use excedrin daily x 2 years that she stopped 4-5 mo ago  -no etoh (last use 5y ago). No smoking (stopped in past 3-4mo)  -No known FHX gastric Ca, eso Ca  She feels her abd pain is better, not having \"rawness\" like previously.  She is pleased to report this.   Stooling - having 1 BM every 1-2d, endorses incomplete evacuation. Does strain. Not on bowel regiment.   She gained weight, up to 130#, was 107# previously. This is her normal weight.   Her largest goal today is to symptomatically manage her acid reflux  She has a EGD with Dr Conklin 10/29/24. A recent H.Pylori stool antigen was neg (she reports PPI was held 14d prior to this test).     9/30/24 appointment with me  She had sent a [employer] message last week requesting blood work as she continues to feel unwell.  A CBC with differential, CMP and lipase were checked.  Calcium was slightly low and hemoglobin level had dropped to the mid tens.  On a follow-up telephone call 9/26 she informed me that the burning upper abdominal pain has improved however still continues.  She did not have any more of the significant severe abdominal pain.  Her weight continues to uptrend.  Appetite has been stable.  No nausea or vomiting.  She denied bleeding in her stools or black stools.  She reported regular bowel movements.  We repeated the blood work to be drawn on 9/27 and included iron panels, ferritin and a repeat calcium.  She presents today to discuss these results  She reports she has been iron deficient in the past and states she was recommended to start " iron however did not start it. She is on a very limited diet, eating PB sandwiches or junk food and drinking water.  She wonders if nutrition has a role to play in this?  -Does have IUD and so no periods or very scant bleeding    -she did have a nose ring and kept hitting it and so she had multiple nosebleeds.  She has removed the ring.   -she is fatigued with low energy and is losing hair .  No chest pain, shortness of breath.  She has stopped smoking/vaping for past 4 months   Weight is up. She is at 135#.   She is taking aciphex 20 mg BID and started famotidine 20 mg BID.  Continues with Gaviscon as needed which she feels is very helpful. The heartburn continues but she just started the famotidine the other day too.   No etoh or drugs. She is on suboxone   EGD got rescheduled to November.  She is very concerned she has cancer.     11/19/24 appointment with me  She had been doing really well until the other day when her burning abdominal pain restarted.  This seems to have coincided when she started taking iron supplement every other day.  It is to the lower abdomen.  It is constant and it does affect her ability to eat and concentrate.  It is negatively affecting her quality of life.  She had been doing so well and is upset that her symptoms have worsened again.  Shares that she tried omeprazole, Nexium, Aciphex and when her pain was bad, feels that these medications actually made them worse.   -Since the start of iron, she is noticing dark green stools  -No fevers, chills, nausea, vomiting, melena, bloody stools  She previously was taking daily Excedrin for 2 years however had stopped that.  She was previously smoking however stopped that a few months ago as well.  She is not drinking any alcohol.  Dgt had watery stool and thinks she has is also coming down with a GI bug. Prior to that, was going daily, soft serve consistency no bloody stools or black stools.   No weight loss. Appetite is stable.   Did well with  "the EGD.  Shares that she was very worried about the sedation but found that she had no issues at all.  She has not scheduled a colonoscopy.  This was undertaken for further evaluation of iron deficiency.  No other questions or concerns today    Today 2/4/2025  She feels quite good today   Carafate 3x a day helps with burning sensation to stomach   No fevers, chills, nausea, vomiting, melena, bloody stools  BM - running constipated, at times having 3-4 BM a days, usually one firm hard stool that is hard to evacuate   -miralax makes her stomach \"blow up\"  -no abd pain/discomfort, nausea/vomiting, heartburn   IV iron has been very helpful   Appetite good, no weight loss   No other questions or concerns today   PAST MEDICAL HISTORY:  No past medical history on file.    PREVIOUS ABDOMINAL/GYNECOLOGIC SURGERIES:  No past surgical history on file.      PERTINENT MEDICATIONS:  Current Outpatient Medications   Medication Sig Dispense Refill     acyclovir (ZOVIRAX) 400 MG tablet Take 1 tablet (400 mg) by mouth every 12 hours. 60 tablet 0     alum hydroxide-mag carbonate (GENACTON)  MG/15ML SUSP suspension Take 30 mLs by mouth 2 times daily as needed for heartburn.       citalopram (CELEXA) 20 MG tablet Take 1.5 tablets (30 mg) by mouth daily (Increase dose once you are able to lower your omeprazole). 45 tablet 0     clotrimazole (LOTRIMIN) 1 % external cream Apply topically 2 times daily. 15 g 1     esomeprazole (NEXIUM) 20 MG DR capsule Take 1 capsule (20 mg) by mouth 2 times daily (before meals). Take 30-60 minutes before eating. 60 capsule 2     esomeprazole (NEXIUM) 40 MG DR capsule Take 1 capsule (40 mg) by mouth 2 times daily. Take 30-60 minutes before eating. 90 capsule 0     esomeprazole (NEXIUM) 40 MG DR capsule Take 1 capsule (40 mg) by mouth 2 times daily Take 30-60 minutes before eating. (Patient not taking: Reported on 12/18/2024) 90 capsule 0     gabapentin (NEURONTIN) 800 MG tablet Take 300 mg by mouth " daily       levonorgestrel (MIRENA) 20 mcg/24 hr (5 years) IUD [LEVONORGESTREL (MIRENA) 20 MCG/24 HR (5 YEARS) IUD] 1 each by Intrauterine route.       mirtazapine (REMERON) 45 MG tablet Take 45 mg by mouth At Bedtime       SUBOXONE 12-3 MG FILM per film Place 1 Film under the tongue 2 times daily       sucralfate (CARAFATE) 1 GM tablet Take 1 tablet (1 g) by mouth 4 times daily. 120 tablet 0     triamcinolone (KENALOG) 0.025 % external ointment Apply topically 2 times daily. Apply to affected area (lip lesions) twice daily.  Do not use for more than 14 days. 15 g 0     valACYclovir (VALTREX) 1000 mg tablet Take 1 tablet (1,000 mg) by mouth daily 90 tablet 2       SOCIAL HISTORY:    Social History     Socioeconomic History     Marital status: Single     Spouse name: Not on file     Number of children: Not on file     Years of education: Not on file     Highest education level: Not on file   Occupational History     Not on file   Tobacco Use     Smoking status: Former     Types: Cigarettes     Smokeless tobacco: Never     Tobacco comments:     6 per day   Vaping Use     Vaping status: Former   Substance and Sexual Activity     Alcohol use: Not on file     Drug use: Not on file     Sexual activity: Not on file   Other Topics Concern     Not on file   Social History Narrative     Not on file     Social Drivers of Health     Financial Resource Strain: Not At Risk (2/14/2024)    Received from [email or web address]    Financial Resource Strain      Is it hard for you to pay for the very basics like food, housing, medical care or heating?: No   Food Insecurity: Not At Risk (2/14/2024)    Received from [facility]    Food Insecurity      Does your food run out before you have the money to buy more?: No   Transportation Needs: Not At Risk (2/14/2024)    Received from [facility]    Transportation Needs      Does a lack of transportation keep you from your medical appointments or  from getting your medications?: No   Physical Activity: Not on file   Stress: Not on file   Social Connections: Not on file   Interpersonal Safety: Low Risk  (12/18/2024)    Interpersonal Safety      Do you feel physically and emotionally safe where you currently live?: Yes      Within the past 12 months, have you been hit, slapped, kicked or otherwise physically hurt by someone?: No      Within the past 12 months, have you been humiliated or emotionally abused in other ways by your partner or ex-partner?: No   Housing Stability: High Risk (10/2/2023)    Housing Stability      Do you have housing? : Yes      Are you worried about losing your housing?: Yes       FAMILY HISTORY:  No colon/panc/esophageal/other GI CA, no other Perez or other HPS-related Jess. No IBD/celiac, no other AI/liver/thyroid disease.  No family history on file.    PHYSICAL EXAMINATION:  Vitals reviewed: There were no vitals taken for this visit.  Wt:   Wt Readings from Last 2 Encounters:   12/18/24 60 kg (132 lb 3.2 oz)   12/12/24 59 kg (130 lb)      Video physical exam  General: Patient appears anxious  Skin: No visualized rash or lesions on visualized skin  Eyes: EOMI, no erythema, sclera icterus or discharge noted  Resp: Appears to be breathing comfortably without accessory muscle usage, speaking in full sentences, no cough  MSK: Appears to have normal range of motion based on visualized movements  Neurologic: No apparent tremors, facial movements symmetric  Psych: affect anxious, alert and oriented    PERTINENT STUDIES - Reviewed in EMR     Lab Results   Component Value Date    WBC 2.3 (L) 01/31/2025    WBC 3.8 (L) 09/27/2024    WBC 4.1 09/26/2024    HGB 14.2 01/31/2025    HGB 11.6 (L) 09/27/2024    HGB 10.7 (L) 09/26/2024     01/31/2025     09/27/2024     09/26/2024    CHOL 165 11/27/2019    TRIG 114 11/27/2019    HDL 50 11/27/2019    ALT 15 09/26/2024    ALT 17 04/16/2024    ALT 20 03/19/2024    AST 23 09/26/2024     AST 19 04/16/2024    AST 21 03/19/2024     09/26/2024     04/16/2024     03/19/2024    BUN 11.0 09/26/2024    BUN 10.7 04/16/2024    BUN 13.7 03/19/2024    CO2 22 09/26/2024    CO2 26 04/16/2024    CO2 26 03/19/2024    TSH 0.91 08/14/2024    TSH 0.50 11/27/2019        Liver Function Studies -   Recent Labs   Lab Test 04/16/24  0851   PROTTOTAL 6.6   ALBUMIN 4.2   BILITOTAL 0.4   ALKPHOS 49   AST 19   ALT 17        PREVIOUS ENDOSCOPY    10/2024 EGD - Normal esophagus.  Esophageal biopsies were unremarkable.  Decreased rugae was seen throughout the entire stomach.  Gastric biopsy with findings of reactive gastropathy.  No signs of atrophy, inflammation, H. pylori.  Normal duodenum.  Duodenal biopsies were unremarkable and negative for celiac disease.      Again, thank you for allowing me to participate in the care of your patient.        Sincerely,        Yue Ramirez PA-C    Electronically signed

## 2025-02-04 NOTE — PROGRESS NOTES
Virtual Visit Details    Type of service:  Video Visit     [place]ig[place] Location [place]    Distant Location (provider location):  Off-site  Platform used for Video Visit: [facility]      GI CLINIC VISIT    ASSESSMENT/PLAN:  34 year old female with PMH of anxiety/depression presenting to GI clinic for dyspepsia, history of weight loss, iron deficiency anemia.  She had a recent upper endoscopy and is here to review the results     # reflux/ heartburn  #regurg  #abd pain  # Dyspepsia  #h/o weight loss, resolved   EGD 10/16/2024 done off PPI (at least 14 days) overall macroscopically unremarkable.  Gastric biopsies showing reactive gastropathy without atrophy or H. pylori.  Duodenal biopsies were negative for celiac disease.  CTAP with contrast 10/20/2024 unremarkable; no evidence of obstruction or colonic inflammation.     After the EGD, the symptoms resolved with some time.  Given the scope findings, it is possible the pain that she had could have been reflective of reactive gastropathy (thought NSAID induced, no longer using), and comorbid functional pain.  Differential includes gastroparesis, atypical biliary disease versus other.     She had a slight return of these sx with use of PO iron and they continued despite stopping the supplements. PO carafate has been helpful, I asked she try decreasing carfate use (TID currently) to see how she responds. Goal is lowest effective dose. She tried PPI and got SE on med. Tried famotidine without good results.     Future consideration  -alternate PPI - dexilant; PCAB  -vonoprazan  -addition of alginate  -GI RD consult  -GES      #h.pylori infection, h/o   4/10/2024 POS s/p pylera x 14d. Confirmed eradicated   Confirmed eradicated, she reports PPI was held 14d prior to stool test 8/15/24 and most recently on EGD (done on PPI holiday) 10/2024.     #DAV, h/o   lowest hgb to 10.7, MCV 78, ferritin 7 now s/p IV iron infusion with Hgb now in 14s and ferritin 115. She  feels a lot better after IV iron infusion     -EGD 10/20/2024 with decrease rugae seen to the stomach, gastric biopsy with reactive gastropathy but no atrophy, intestinal metaplasia or H. Pylori.  Duodenal biopsies were negative for celiac disease  -Cscope 12/2024 - ileal intubation with poor prep, normal macro colon. Findings of hemorrhoids. No polyps taken or evidence of bleeding under visualization.   -H.pylori was POS 4/2024 but that had been successfully treated with confirmed eradication  -Duodenal biopsies have been negative for celiac disease  -it's possible a component of dietary preferences could be driving this anemia, shares she was on a very restrictive diet due only eating PB sandwiches and junk food.   -She has an IUD so has scant periods or no periods at all     -prior DAV could have been driven by h.pylori infection (now eradicated) and poor dietary choices. No longer anemic, will monitor along with care team     #stooling  Trouble with constipation. Metabolic and TSH WNL. Duodenal Bx NEG for celiac disease. Bidirectional scopes done. Likely driven by diet / lifestyle and med induced SE (mirtazepine, gabapentin, suboxone) with ddx to include functional constipation vs other.    -daily magnesium recommended, Rx'd . Renal functional WNL (1/22/2025)  Future consideration includes secretagogue trial given daily suboxone use     #leukopenia  Seen on recent CBC, she suffers from recurrent genital herpes outbreaks. This could certainly lead to leukopenia. I asked she follow this finding with her primary in a few months.     Return in 6 months or sooner as needed    Thank you for this consultation. It was a pleasure to participate in the care of this patient; please contact us with any further questions.    Yue Ramirez PA-C  Code per complexity    HPI: 34 year old female with PMH of anxiety/depression presenting to GI clinic for abd pain, reflux, and weight loss  Seen with Dr Jyoti Conklin 6/2024 and most  "recently with me 9/30/2024.     6/2024 appt  Dr Jyoti Hou presents today for a video visit to discuss abdominal pain, acid reflux and weight loss.  Was found to have H. Pylori - was treated and abdominal pain and weight loss improved but heartburn has persisted.  Has also developed a pressure sensation in her abdomen that happens after eating or drinking coffee - feels it in her lower stomach and then goes all the way down to her lower abdomen. Also feels like she maybe constipated - has bowel movements but has a lot of pressure with them - this persists even after she goes.  Is taking stool softeners to have a bowel movement every day after imaging shows she was constipated but isn't working    Hasn't been checked for eradication of H. Pylori - whenever she comes off her nexium she gets sick.    Did have x-rays which showed large stool burden - as did CT scan in 2017.    Thinks she has been gaining weight now after treatment for H. Pylori    Is very worried she might have cancer - understandably has a lot of anxiety over her recent illness    8/20/24 appointment with me    She is still having GI symptoms - states saw a urologist and reports \"the acid is going down to my bladder.\" States when she has bad acid reflux, her bladder burning pain also flares. This is an external [facility] - ?interstitial cystitis. States she was given a uro med but did not want to start it yet as she wanted to target the acid reflux and try for better control  Acid reflux is largest issue, daily breakthrough despite Nexium 40 mg BID + PRN gaviscon (not daily but does help). Tums does not help. Not on pepcid   -will have occ nausea with severe heartburn but denies emesis  -no dysphagia, odynphagia, melena, abdominal pain, unintentional weight loss   -drinks 2 cups of coffee daily - admits this does flare her acid sx  -tries to eat 3 meals daily, eating PB and J sandwiches only  -She feels stress is related to her reflux " "as well, single mom with 3 kids   -Sleeping upright for past 4 months otherwise she gets a rush of acid coming up throat (regurg)  -not on NSAIDs but did use excedrin daily x 2 years that she stopped 4-5 mo ago  -no etoh (last use 5y ago). No smoking (stopped in past 3-4mo)  -No known FHX gastric Ca, eso Ca  She feels her abd pain is better, not having \"rawness\" like previously.  She is pleased to report this.   Stooling - having 1 BM every 1-2d, endorses incomplete evacuation. Does strain. Not on bowel regiment.   She gained weight, up to 130#, was 107# previously. This is her normal weight.   Her largest goal today is to symptomatically manage her acid reflux  She has a EGD with Dr Conklin 10/29/24. A recent H.Pylori stool antigen was neg (she reports PPI was held 14d prior to this test).     9/30/24 appointment with me  She had sent a [employer] message last week requesting blood work as she continues to feel unwell.  A CBC with differential, CMP and lipase were checked.  Calcium was slightly low and hemoglobin level had dropped to the mid tens.  On a follow-up telephone call 9/26 she informed me that the burning upper abdominal pain has improved however still continues.  She did not have any more of the significant severe abdominal pain.  Her weight continues to uptrend.  Appetite has been stable.  No nausea or vomiting.  She denied bleeding in her stools or black stools.  She reported regular bowel movements.  We repeated the blood work to be drawn on 9/27 and included iron panels, ferritin and a repeat calcium.  She presents today to discuss these results  She reports she has been iron deficient in the past and states she was recommended to start iron however did not start it. She is on a very limited diet, eating PB sandwiches or junk food and drinking water.  She wonders if nutrition has a role to play in this?  -Does have IUD and so no periods or very scant bleeding    -she did have a nose ring and kept " hitting it and so she had multiple nosebleeds.  She has removed the ring.   -she is fatigued with low energy and is losing hair .  No chest pain, shortness of breath.  She has stopped smoking/vaping for past 4 months   Weight is up. She is at 135#.   She is taking aciphex 20 mg BID and started famotidine 20 mg BID.  Continues with Gaviscon as needed which she feels is very helpful. The heartburn continues but she just started the famotidine the other day too.   No etoh or drugs. She is on suboxone   EGD got rescheduled to November.  She is very concerned she has cancer.     11/19/24 appointment with me  She had been doing really well until the other day when her burning abdominal pain restarted.  This seems to have coincided when she started taking iron supplement every other day.  It is to the lower abdomen.  It is constant and it does affect her ability to eat and concentrate.  It is negatively affecting her quality of life.  She had been doing so well and is upset that her symptoms have worsened again.  Shares that she tried omeprazole, Nexium, Aciphex and when her pain was bad, feels that these medications actually made them worse.   -Since the start of iron, she is noticing dark green stools  -No fevers, chills, nausea, vomiting, melena, bloody stools  She previously was taking daily Excedrin for 2 years however had stopped that.  She was previously smoking however stopped that a few months ago as well.  She is not drinking any alcohol.  Dgt had watery stool and thinks she has is also coming down with a GI bug. Prior to that, was going daily, soft serve consistency no bloody stools or black stools.   No weight loss. Appetite is stable.   Did well with the EGD.  Shares that she was very worried about the sedation but found that she had no issues at all.  She has not scheduled a colonoscopy.  This was undertaken for further evaluation of iron deficiency.  No other questions or concerns today    Today  "2/4/2025  She feels quite good today   Carafate 3x a day helps with burning sensation to stomach   No fevers, chills, nausea, vomiting, melena, bloody stools  BM - running constipated, at times having 3-4 BM a days, usually one firm hard stool that is hard to evacuate   -miralax makes her stomach \"blow up\"  -no abd pain/discomfort, nausea/vomiting, heartburn   IV iron has been very helpful   Appetite good, no weight loss   No other questions or concerns today   PAST MEDICAL HISTORY:  No past medical history on file.    PREVIOUS ABDOMINAL/GYNECOLOGIC SURGERIES:  No past surgical history on file.      PERTINENT MEDICATIONS:  Current Outpatient Medications   Medication Sig Dispense Refill    acyclovir (ZOVIRAX) 400 MG tablet Take 1 tablet (400 mg) by mouth every 12 hours. 60 tablet 0    alum hydroxide-mag carbonate (GENACTON)  MG/15ML SUSP suspension Take 30 mLs by mouth 2 times daily as needed for heartburn.      citalopram (CELEXA) 20 MG tablet Take 1.5 tablets (30 mg) by mouth daily (Increase dose once you are able to lower your omeprazole). 45 tablet 0    clotrimazole (LOTRIMIN) 1 % external cream Apply topically 2 times daily. 15 g 1    esomeprazole (NEXIUM) 20 MG DR capsule Take 1 capsule (20 mg) by mouth 2 times daily (before meals). Take 30-60 minutes before eating. 60 capsule 2    esomeprazole (NEXIUM) 40 MG DR capsule Take 1 capsule (40 mg) by mouth 2 times daily. Take 30-60 minutes before eating. 90 capsule 0    esomeprazole (NEXIUM) 40 MG DR capsule Take 1 capsule (40 mg) by mouth 2 times daily Take 30-60 minutes before eating. (Patient not taking: Reported on 12/18/2024) 90 capsule 0    gabapentin (NEURONTIN) 800 MG tablet Take 300 mg by mouth daily      levonorgestrel (MIRENA) 20 mcg/24 hr (5 years) IUD [LEVONORGESTREL (MIRENA) 20 MCG/24 HR (5 YEARS) IUD] 1 each by Intrauterine route.      mirtazapine (REMERON) 45 MG tablet Take 45 mg by mouth At Bedtime      SUBOXONE 12-3 MG FILM per film Place 1 " Film under the tongue 2 times daily      sucralfate (CARAFATE) 1 GM tablet Take 1 tablet (1 g) by mouth 4 times daily. 120 tablet 0    triamcinolone (KENALOG) 0.025 % external ointment Apply topically 2 times daily. Apply to affected area (lip lesions) twice daily.  Do not use for more than 14 days. 15 g 0    valACYclovir (VALTREX) 1000 mg tablet Take 1 tablet (1,000 mg) by mouth daily 90 tablet 2       SOCIAL HISTORY:    Social History     Socioeconomic History    Marital status: Single     Spouse name: Not on file    Number of children: Not on file    Years of education: Not on file    Highest education level: Not on file   Occupational History    Not on file   Tobacco Use    Smoking status: Former     Types: Cigarettes    Smokeless tobacco: Never    Tobacco comments:     6 per day   Vaping Use    Vaping status: Former   Substance and Sexual Activity    Alcohol use: Not on file    Drug use: Not on file    Sexual activity: Not on file   Other Topics Concern    Not on file   Social History Narrative    Not on file     Social Drivers of Health     Financial Resource Strain: Not At Risk (2/14/2024)    [facility]    Financial Resource Strain     Is it hard for you to pay for the very basics like food, housing, medical care or heating?: No   Food Insecurity: Not At Risk (2/14/2024)    [facility]    Food Insecurity     Does your food run out before you have the money to buy more?: No   Transportation Needs: Not At Risk (2/14/2024)    Received from [facility], [facility]    Transportation Needs     Does a lack of transportation keep you from your medical appointments or from getting your medications?: No   Physical Activity: Not on file   Stress: Not on file   Social Connections: Not on file   Interpersonal Safety: Low Risk  (12/18/2024)    Interpersonal Safety     Do you feel physically and emotionally safe where you currently live?: Yes      Within the past 12 months, have you been hit, slapped, kicked or otherwise physically hurt by someone?: No     Within the past 12 months, have you been humiliated or emotionally abused in other ways by your partner or ex-partner?: No   Housing Stability: High Risk (10/2/2023)    Housing Stability     Do you have housing? : Yes     Are you worried about losing your housing?: Yes       FAMILY HISTORY:  No colon/panc/esophageal/other GI CA, no other Perez or other HPS-related Jess. No IBD/celiac, no other AI/liver/thyroid disease.  No family history on file.    PHYSICAL EXAMINATION:  Vitals reviewed: There were no vitals taken for this visit.  Wt:   Wt Readings from Last 2 Encounters:   12/18/24 60 kg (132 lb 3.2 oz)   12/12/24 59 kg (130 lb)      Video physical exam  General: Patient appears anxious  Skin: No visualized rash or lesions on visualized skin  Eyes: EOMI, no erythema, sclera icterus or discharge noted  Resp: Appears to be breathing comfortably without accessory muscle usage, speaking in full sentences, no cough  MSK: Appears to have normal range of motion based on visualized movements  Neurologic: No apparent tremors, facial movements symmetric  Psych: affect anxious, alert and oriented    PERTINENT STUDIES - Reviewed in EMR     Lab Results   Component Value Date    WBC 2.3 (L) 01/31/2025    WBC 3.8 (L) 09/27/2024    WBC 4.1 09/26/2024    HGB 14.2 01/31/2025    HGB 11.6 (L) 09/27/2024    HGB 10.7 (L) 09/26/2024     01/31/2025     09/27/2024     09/26/2024    CHOL 165 11/27/2019    TRIG 114 11/27/2019    HDL 50 11/27/2019    ALT 15 09/26/2024    ALT 17 04/16/2024    ALT 20 03/19/2024    AST 23 09/26/2024    AST 19 04/16/2024    AST 21 03/19/2024     09/26/2024     04/16/2024     03/19/2024    BUN 11.0 09/26/2024    BUN 10.7 04/16/2024    BUN 13.7 03/19/2024    CO2 22 09/26/2024    CO2 26 04/16/2024    CO2 26 03/19/2024    TSH 0.91 08/14/2024    TSH 0.50 11/27/2019         Liver Function Studies -   Recent Labs   Lab Test 04/16/24  0851   PROTTOTAL 6.6   ALBUMIN 4.2   BILITOTAL 0.4   ALKPHOS 49   AST 19   ALT 17        PREVIOUS ENDOSCOPY    10/2024 EGD - Normal esophagus.  Esophageal biopsies were unremarkable.  Decreased rugae was seen throughout the entire stomach.  Gastric biopsy with findings of reactive gastropathy.  No signs of atrophy, inflammation, H. pylori.  Normal duodenum.  Duodenal biopsies were unremarkable and negative for celiac disease.

## 2025-02-05 RX ORDER — MAGNESIUM OXIDE 400 MG/1
400 TABLET ORAL DAILY
Qty: 90 TABLET | Refills: 1 | Status: SHIPPED | OUTPATIENT
Start: 2025-02-05

## 2025-02-05 NOTE — PATIENT INSTRUCTIONS
It was a pleasure taking care of you today.  I've included a brief summary of our discussion and care plan from today's visit below.  Please review this information with your primary care provider.  _______________________________________________________________________    My recommendations are summarized as follows:    Cut down on carafate to 2x a day, see how your body responds. If you do well, try to decrease to once a day too. Goal is always lowest effective dose. If we can get you off the med completely, that'd be great   -carafate can constipate too  Sending magnesium for you to try for bowel movements. Take at night   Follow blood work with primary care provider in a few months - low level is likely due to viral infection       Please call our clinic or send a [employer] message to us if you have any questions or concerns. [employer] messages are answered by your nurse or doctor typically within 24 hours.  Please allow extra time on weekends and holidays    Return to GI Clinic in 6 months to review your progress.    _______________________________________________________________________    How do I schedule labs, imaging studies, or procedures that were ordered in clinic today?      Labs: To schedule lab appointment at the [facility], use my chart or call [phone]. If you have a [place] lab closer to home where you are regularly seen you can give them a call.      Procedures: If a colonoscopy, upper endoscopy, breath test, esophageal manometry, or pH impedence was ordered today, our endoscopy team will call you to schedule this. If you have not heard from our endoscopy team within a week, please call [phone] option 2 to schedule.      Imaging Studies: If you were scheduled for a CT scan, X-ray, MRI, ultrasound, HIDA scan, EKG or other imaging study, please call [phone] to have this scheduled.      Referral: If a referral to another specialty was ordered, expect a phone call or  follow instructions above. If you have not heard from anyone regarding your referral in a week, please call our clinic to check the status.      Who do I call with any questions after my visit?  Please be in touch if there are any further questions that arise following today's visit.  There are multiple ways to contact your gastroenterology care team.       During business hours, you may reach a Gastroenterology nurse at [phone]     To schedule or reschedule an appointment, please call [phone].      You can always send a secure message through TicketFire.  TicketFire messages are answered by your nurse or doctor typically within 24 hours.  Please allow extra time on weekends and holidays.       For urgent/emergent questions after business hours, you may reach the on-call GI Fellow by contacting the [facility]  at [phone].     How will I get the results of any tests ordered?    You will receive all of your results.  If you have signed up for TicketFire, any tests ordered at your visit will be available to you after your physician reviews them.  Typically this takes 1-2 weeks.  If there are urgent results that require a change in your care plan, your physician or nurse will call you to discuss the next steps.       What is TicketFire?  TicketFire is a secure way for you to access all of your healthcare records from the [facility].  It is a web based computer program, so you can sign on to it from any location.  It also allows you to send secure messages to your care team.  I recommend signing up for TicketFire access if you have not already done so and are comfortable with using a computer.       How to I schedule a follow-up visit?  If you did not schedule a follow-up visit today, please call [phone] to schedule a follow-up office visit.      Sincerely,    Yue Ramirez PA-C  Gastroenterology

## 2025-02-07 ENCOUNTER — MYC REFILL (OUTPATIENT)
Dept: GASTROENTEROLOGY | Facility: CLINIC | Age: 35
End: 2025-02-07
Payer: COMMERCIAL

## 2025-02-07 ENCOUNTER — MYC REFILL (OUTPATIENT)
Dept: FAMILY MEDICINE | Facility: CLINIC | Age: 35
End: 2025-02-07
Payer: COMMERCIAL

## 2025-02-07 DIAGNOSIS — N94.9 GENITAL LESION, FEMALE: ICD-10-CM

## 2025-02-07 DIAGNOSIS — B00.9 RECURRENT HERPES SIMPLEX: ICD-10-CM

## 2025-02-07 DIAGNOSIS — R10.9 ABDOMINAL DISCOMFORT: ICD-10-CM

## 2025-02-07 DIAGNOSIS — R10.13 EPIGASTRIC PAIN: ICD-10-CM

## 2025-02-09 ENCOUNTER — MYC REFILL (OUTPATIENT)
Dept: GASTROENTEROLOGY | Facility: CLINIC | Age: 35
End: 2025-02-09
Payer: COMMERCIAL

## 2025-02-09 DIAGNOSIS — R12 HEARTBURN: ICD-10-CM

## 2025-02-09 DIAGNOSIS — R10.13 EPIGASTRIC PAIN: ICD-10-CM

## 2025-02-09 RX ORDER — ACYCLOVIR 400 MG/1
400 TABLET ORAL EVERY 8 HOURS
Qty: 60 TABLET | Refills: 0 | Status: SHIPPED | OUTPATIENT
Start: 2025-02-09

## 2025-02-09 RX ORDER — ESOMEPRAZOLE MAGNESIUM 40 MG/1
40 CAPSULE, DELAYED RELEASE ORAL 2 TIMES DAILY
Qty: 90 CAPSULE | Refills: 0 | Status: CANCELLED | OUTPATIENT
Start: 2025-02-09

## 2025-02-10 RX ORDER — SUCRALFATE 1 G/1
1 TABLET ORAL 2 TIMES DAILY
Qty: 120 TABLET | Refills: 0 | Status: SHIPPED | OUTPATIENT
Start: 2025-02-10

## 2025-02-13 ENCOUNTER — VIRTUAL VISIT (OUTPATIENT)
Dept: FAMILY MEDICINE | Facility: CLINIC | Age: 35
End: 2025-02-13
Payer: COMMERCIAL

## 2025-02-13 ENCOUNTER — LAB (OUTPATIENT)
Dept: LAB | Facility: CLINIC | Age: 35
End: 2025-02-13
Attending: PHYSICIAN ASSISTANT
Payer: COMMERCIAL

## 2025-02-13 DIAGNOSIS — R05.1 ACUTE COUGH: ICD-10-CM

## 2025-02-13 DIAGNOSIS — R05.1 ACUTE COUGH: Primary | ICD-10-CM

## 2025-02-13 LAB
FLUAV RNA SPEC QL NAA+PROBE: NEGATIVE
FLUBV RNA RESP QL NAA+PROBE: NEGATIVE
RSV RNA SPEC NAA+PROBE: POSITIVE
SARS-COV-2 RNA RESP QL NAA+PROBE: NEGATIVE

## 2025-02-13 RX ORDER — FLUTICASONE PROPIONATE 50 MCG
1 SPRAY, SUSPENSION (ML) NASAL DAILY
Qty: 15.8 ML | Refills: 0 | Status: SHIPPED | OUTPATIENT
Start: 2025-02-13

## 2025-02-13 NOTE — PROGRESS NOTES
Savi is a 34 year old who is being evaluated via a billable video visit.      Assessment & Plan   Problem List Items Addressed This Visit       Acute cough - Primary     Presents today via video visit with symptoms of chest congestion, sore throat, body aches and chills that began 4 days ago.  She has been using Sudafed and Tylenol without much improvement in her symptoms.  She has also been experiencing headaches.    Discussed that it sounds like she has a influenza-like illness.  She is outside the window of treatment.  She would like to know if she has influenza or COVID as her daughter has similar symptoms.  Will proceed with influenza, RSV and COVID testing at this time.  Symptomatic cares.  Encouraged to alternate Tylenol with ibuprofen.  She tells me that she is unable to tolerate ibuprofen due to history of GI issues.  Discussed rest and fluids.  Flonase was ordered.  Recheck if symptoms persist or worsen         Relevant Medications    fluticasone (FLONASE) 50 MCG/ACT nasal spray    Other Relevant Orders    Influenza A/B, RSV and SARS-CoV2 PCR (COVID-19)      Subjective   Savi is a 34 year old, presenting for the following health issues:  Sinusitis (Congestion 3 days), Generalized Body Aches (2 days with chills), and Otalgia (3 days right ear)        2/13/2025     3:00 PM   Additional Questions   Roomed by ac   Accompanied by self     Video Start Time:  3.00 pm    Sinusitis         Objective       Vitals:  No vitals were obtained today due to virtual visit.    Physical Exam   GENERAL: alert and no distress  EYES: Eyes grossly normal to inspection.  No discharge or erythema, or obvious scleral/conjunctival abnormalities.  RESP: No audible wheeze, cough, or visible cyanosis.    SKIN: Visible skin clear. No significant rash, abnormal pigmentation or lesions.  NEURO: Cranial nerves grossly intact.  Mentation and speech appropriate for age.  PSYCH: Appropriate affect, tone, and pace of words      Video-Visit  Details    Type of service:  Video Visit   Video End Time:3:11 PM  Originating Location (pt. Location): Home  Distant Location (provider location):  On-site  Platform used for Video Visit: Zoom (Telehealth)  Signed Electronically by: Alexandra Bustillos PA-C

## 2025-02-13 NOTE — ASSESSMENT & PLAN NOTE
Presents today via video visit with symptoms of chest congestion, sore throat, body aches and chills that began 4 days ago.  She has been using Sudafed and Tylenol without much improvement in her symptoms.  She has also been experiencing headaches.    Discussed that it sounds like she has a influenza-like illness.  She is outside the window of treatment.  She would like to know if she has influenza or COVID as her daughter has similar symptoms.  Will proceed with influenza, RSV and COVID testing at this time.  Symptomatic cares.  Encouraged to alternate Tylenol with ibuprofen.  She tells me that she is unable to tolerate ibuprofen due to history of GI issues.  Discussed rest and fluids.  Flonase was ordered.  Recheck if symptoms persist or worsen

## 2025-02-16 ENCOUNTER — MYC REFILL (OUTPATIENT)
Dept: GASTROENTEROLOGY | Facility: CLINIC | Age: 35
End: 2025-02-16
Payer: COMMERCIAL

## 2025-02-16 DIAGNOSIS — R12 HEARTBURN: ICD-10-CM

## 2025-02-16 DIAGNOSIS — R10.13 EPIGASTRIC PAIN: ICD-10-CM

## 2025-02-17 RX ORDER — ESOMEPRAZOLE MAGNESIUM 40 MG/1
40 CAPSULE, DELAYED RELEASE ORAL 2 TIMES DAILY
Qty: 90 CAPSULE | Refills: 1 | Status: SHIPPED | OUTPATIENT
Start: 2025-02-17

## 2025-02-25 ENCOUNTER — VIRTUAL VISIT (OUTPATIENT)
Dept: FAMILY MEDICINE | Facility: CLINIC | Age: 35
End: 2025-02-25
Payer: COMMERCIAL

## 2025-02-25 DIAGNOSIS — D72.819 LEUKOPENIA, UNSPECIFIED TYPE: Primary | ICD-10-CM

## 2025-02-25 PROCEDURE — 98005 SYNCH AUDIO-VIDEO EST LOW 20: CPT

## 2025-02-25 NOTE — PROGRESS NOTES
"Savi is a 34 year old who is being evaluated via a billable video visit.    How would you like to obtain your AVS? MyChart  If the video visit is dropped, the invitation should be resent by: Text to cell phone: [phone]  Will anyone else be joining your video visit? No      Assessment & Plan   Assessment & Plan  Leukopenia, unspecified type  Patient presents today in follow up from some recent outside labs suggestive of leukopenia to 2.3. Upon review of her chart, her baseline appears to be on the low end of normal. Discussed the transient nature of some blood abnormalities. Now that she is feeling better, recommend rechecking CBC with differential. If still abnormal, can expand workup to include blood smear and other diagnostics. Patient expressed understanding of and agreement with this plan. All questions were answered.  Orders:    CBC with platelets and differential; Future    Subjective   Savi is a 34 year old, presenting for the following health issues:  Orders (For bloodwork. Needs follow up to recheck WBC per Gastro)      2/25/2025     8:14 AM   Additional Questions   Roomed by sac   Accompanied by self         2/25/2025     8:14 AM   Patient Reported Additional Medications   Patient reports taking the following new medications no     Video Start Time: 12:01 PM    VV to discuss labs  Seen by gastroenterology - had CBC drawn which showed WBC of 2.3. Requesting recheck  \"I feel great!\" Stomach symptoms have improved. With iron infusions, hair is no longer falling out. Overall doing quite well.              Objective       Vitals:  No vitals were obtained today due to virtual visit.    Physical Exam   GENERAL: alert and no distress  EYES: Eyes grossly normal to inspection.  No discharge or erythema, or obvious scleral/conjunctival abnormalities.  RESP: No audible wheeze, cough, or visible cyanosis.    SKIN: Visible skin clear. No significant rash, abnormal pigmentation or lesions.  NEURO: Cranial nerves " grossly intact.  Mentation and speech appropriate for age.  PSYCH: Appropriate affect, tone, and pace of words      Video-Visit Details    Type of service:  Video Visit   Video End Time:12:06 PM  Originating Location (pt. Location): Home    Distant Location (provider location):  On-site  Platform used for Video Visit: Matilda  Signed Electronically by: KATHLEEN Bal CNP

## 2025-02-27 ENCOUNTER — LAB (OUTPATIENT)
Dept: LAB | Facility: CLINIC | Age: 35
End: 2025-02-27
Payer: COMMERCIAL

## 2025-02-27 ENCOUNTER — MYC MEDICAL ADVICE (OUTPATIENT)
Dept: FAMILY MEDICINE | Facility: CLINIC | Age: 35
End: 2025-02-27

## 2025-02-27 DIAGNOSIS — D72.819 LEUKOPENIA, UNSPECIFIED TYPE: ICD-10-CM

## 2025-02-27 LAB
BASOPHILS # BLD AUTO: 0 10E3/UL (ref 0–0.2)
BASOPHILS NFR BLD AUTO: 0 %
EOSINOPHIL # BLD AUTO: 0.1 10E3/UL (ref 0–0.7)
EOSINOPHIL NFR BLD AUTO: 2 %
ERYTHROCYTE [DISTWIDTH] IN BLOOD BY AUTOMATED COUNT: 15.5 % (ref 10–15)
HCT VFR BLD AUTO: 41.1 % (ref 35–47)
HGB BLD-MCNC: 13.7 G/DL (ref 11.7–15.7)
IMM GRANULOCYTES # BLD: 0 10E3/UL
IMM GRANULOCYTES NFR BLD: 0 %
LYMPHOCYTES # BLD AUTO: 0.7 10E3/UL (ref 0.8–5.3)
LYMPHOCYTES NFR BLD AUTO: 22 %
MCH RBC QN AUTO: 29.3 PG (ref 26.5–33)
MCHC RBC AUTO-ENTMCNC: 33.3 G/DL (ref 31.5–36.5)
MCV RBC AUTO: 88 FL (ref 78–100)
MONOCYTES # BLD AUTO: 0.3 10E3/UL (ref 0–1.3)
MONOCYTES NFR BLD AUTO: 9 %
NEUTROPHILS # BLD AUTO: 2.1 10E3/UL (ref 1.6–8.3)
NEUTROPHILS NFR BLD AUTO: 66 %
PLATELET # BLD AUTO: 187 10E3/UL (ref 150–450)
RBC # BLD AUTO: 4.67 10E6/UL (ref 3.8–5.2)
WBC # BLD AUTO: 3.1 10E3/UL (ref 4–11)

## 2025-03-03 ENCOUNTER — MYC REFILL (OUTPATIENT)
Dept: GASTROENTEROLOGY | Facility: CLINIC | Age: 35
End: 2025-03-03
Payer: COMMERCIAL

## 2025-03-03 DIAGNOSIS — R10.13 EPIGASTRIC PAIN: ICD-10-CM

## 2025-03-03 DIAGNOSIS — R10.9 ABDOMINAL DISCOMFORT: ICD-10-CM

## 2025-03-04 RX ORDER — SUCRALFATE 1 G/1
1 TABLET ORAL 3 TIMES DAILY
Qty: 90 TABLET | Refills: 3 | Status: SHIPPED | OUTPATIENT
Start: 2025-03-04 | End: 2025-04-03

## 2025-03-04 NOTE — TELEPHONE ENCOUNTER
Medication request: sucralfate (CARAFATE) 1 GM tablet   Sig: Take 1 tablet (1 g) by mouth 2 times daily   Last filled: 2/10/25  Last Qty: 120  Pt's last office visit: 2/4/25  Next scheduled office visit: None    Patient comment: Please refill i still use it 3 times a day I will be out in 1 day. Thank you!!       Rx pended and routed to provider for review and approval if appropriate.

## 2025-03-15 ENCOUNTER — HEALTH MAINTENANCE LETTER (OUTPATIENT)
Age: 35
End: 2025-03-15

## 2025-03-20 PROBLEM — M21.612 BUNION, LEFT: Status: ACTIVE | Noted: 2025-03-20

## 2025-03-24 PROBLEM — D50.9 ANEMIA, IRON DEFICIENCY: Status: ACTIVE | Noted: 2024-12-13

## 2025-03-24 PROBLEM — A04.8 H. PYLORI INFECTION: Status: RESOLVED | Noted: 2024-03-19 | Resolved: 2025-03-24

## 2025-03-24 PROBLEM — R39.89 BLADDER PAIN: Status: RESOLVED | Noted: 2024-07-30 | Resolved: 2025-03-24

## 2025-03-24 PROBLEM — B00.9 RECURRENT HERPES SIMPLEX: Status: ACTIVE | Noted: 2023-10-02

## 2025-03-24 PROBLEM — R14.0 ABDOMINAL BLOATING: Status: RESOLVED | Noted: 2024-06-28 | Resolved: 2025-03-24

## 2025-03-24 PROBLEM — Z97.5 IUD (INTRAUTERINE DEVICE) IN PLACE: Status: ACTIVE | Noted: 2025-03-24

## 2025-03-24 PROBLEM — R05.1 ACUTE COUGH: Status: RESOLVED | Noted: 2025-02-13 | Resolved: 2025-03-24

## 2025-03-24 PROBLEM — K21.9 GASTROESOPHAGEAL REFLUX DISEASE WITHOUT ESOPHAGITIS: Status: ACTIVE | Noted: 2025-03-24

## 2025-04-08 PROBLEM — R41.840 ATTENTION AND CONCENTRATION DEFICIT: Status: ACTIVE | Noted: 2017-02-21

## 2025-04-08 PROBLEM — Z00.00 PREVENTATIVE HEALTH CARE: Status: ACTIVE | Noted: 2025-04-08

## 2025-04-08 PROBLEM — Z87.891 HISTORY OF SMOKING: Status: ACTIVE | Noted: 2024-03-19

## 2025-04-21 ENCOUNTER — VIRTUAL VISIT (OUTPATIENT)
Dept: INTERNAL MEDICINE | Facility: CLINIC | Age: 35
End: 2025-04-21
Payer: COMMERCIAL

## 2025-04-21 DIAGNOSIS — D50.9 IRON DEFICIENCY ANEMIA, UNSPECIFIED IRON DEFICIENCY ANEMIA TYPE: Primary | ICD-10-CM

## 2025-04-21 DIAGNOSIS — D72.819 LEUKOPENIA, UNSPECIFIED TYPE: ICD-10-CM

## 2025-04-21 PROCEDURE — 98002 SYNCH AUDIO-VIDEO NEW MOD 45: CPT | Performed by: NURSE PRACTITIONER

## 2025-04-21 RX ORDER — LISDEXAMFETAMINE DIMESYLATE 50 MG/1
50 CAPSULE ORAL EVERY MORNING
COMMUNITY
Start: 2025-04-18

## 2025-04-21 NOTE — PROGRESS NOTES
Savi is a 35 year old who is being evaluated via a billable video visit.    How would you like to obtain your AVS? MyChart  If the video visit is dropped, the invitation should be resent by: Text to cell phone: [phone]  Will anyone else be joining your video visit? No      Assessment & Plan     Iron deficiency anemia, unspecified iron deficiency anemia type  Thought to possibly be related to decreased dietary intake of iron.  She has had iron infusions.  She would like a CBC rechecked as well as iron studies    - CBC with platelets; Future  - Iron and iron binding capacity; Future    Leukopenia, unspecified type  Unclear etiology.  She denies Mediterranean ethnicity.  No prior significant hematological disorders that she is aware of.    Her WBC has waxed and waned over the last couple of years, sometimes normal, sometimes slightly low.    This has brought about some anxiety for her.  She wants the WBC rechecked today.  We talked about having a conversation with hematology on whether she needs to have any additional workup.  She will take a hematology consult under consideration    - Adult Oncology/Hematology  Referral; Future      Patient Instructions   I placed lab orders so that you could have a WBC and hemoglobin rechecked, as well as iron studies.    It is unclear why your WBC has been intermittently low.  Sometimes, we are not able to identify a cause and otherwise healthy individuals.    To help krysten some anxiety, it may be a good idea to have a conversation with hematology on whether you need any additional workup.  I placed a referral for you today.            Subjective   Savi is a 35 year old, presenting for the following health issues:  Follow Up (Hx Iron deficiency, low WBC wants to retest. Has noticed her hair is falling out more)      Video Start Time:  515 pm    HPI      Video visit to discuss getting some repeat lab orders.  She wants a hemoglobin checked.  She also wants iron  studies checked.    History of iron deficiency anemia.  She has not demonstrated evidence of bleeding but admits to poor diet.    WBC intermittently low.  She has not been told why.  This brings about a lot of anxiety for her.      Objective           Vitals:  No vitals were obtained today due to virtual visit.    Physical Exam   GENERAL: alert and no distress  EYES: Eyes grossly normal to inspection.  No discharge or erythema, or obvious scleral/conjunctival abnormalities.  RESP: No audible wheeze, cough, or visible cyanosis.    SKIN: Visible skin clear. No significant rash, abnormal pigmentation or lesions.  NEURO: Cranial nerves grossly intact.  Mentation and speech appropriate for age.  PSYCH: Appropriate affect, tone, and pace of words          Video-Visit Details    Type of service:  Video Visit   Video End Time: 524  Originating Location (pt. Location): Home    Distant Location (provider location):  On-site  Platform used for Video Visit: Chavez  Signed Electronically by: Christian Jaffe, CNP

## 2025-04-22 ENCOUNTER — PATIENT OUTREACH (OUTPATIENT)
Dept: ONCOLOGY | Facility: CLINIC | Age: 35
End: 2025-04-22

## 2025-04-22 ENCOUNTER — LAB (OUTPATIENT)
Dept: LAB | Facility: CLINIC | Age: 35
End: 2025-04-22
Payer: COMMERCIAL

## 2025-04-22 DIAGNOSIS — D50.9 IRON DEFICIENCY ANEMIA, UNSPECIFIED IRON DEFICIENCY ANEMIA TYPE: ICD-10-CM

## 2025-04-22 LAB
ERYTHROCYTE [DISTWIDTH] IN BLOOD BY AUTOMATED COUNT: 13.3 % (ref 10–15)
HCT VFR BLD AUTO: 43.1 % (ref 35–47)
HGB BLD-MCNC: 14.9 G/DL (ref 11.7–15.7)
MCH RBC QN AUTO: 31.5 PG (ref 26.5–33)
MCHC RBC AUTO-ENTMCNC: 34.6 G/DL (ref 31.5–36.5)
MCV RBC AUTO: 91 FL (ref 78–100)
PLATELET # BLD AUTO: 196 10E3/UL (ref 150–450)
RBC # BLD AUTO: 4.73 10E6/UL (ref 3.8–5.2)
WBC # BLD AUTO: 3.7 10E3/UL (ref 4–11)

## 2025-04-22 PROCEDURE — 83540 ASSAY OF IRON: CPT

## 2025-04-22 PROCEDURE — 85027 COMPLETE CBC AUTOMATED: CPT

## 2025-04-22 PROCEDURE — 83550 IRON BINDING TEST: CPT

## 2025-04-22 PROCEDURE — 36415 COLL VENOUS BLD VENIPUNCTURE: CPT

## 2025-04-22 NOTE — PROGRESS NOTES
New Patient Oncology Nurse Navigator Note     Referral Received: 04/22/25      Referring provider: Christian Jaffe CNP     Referring Clinic/Organization: [facility]     Referred to: Benign Hematology    Requested provider (if applicable): First available - did not specify      Evaluation for :   Diagnosis   D72.819 (ICD-10-CM) - Leukopenia, unspecified type      Clinical History (per Nurse review of records provided):      4/21 OV Ld:   Assessment & Plan  Iron deficiency anemia, unspecified iron deficiency anemia type  Thought to possibly be related to decreased dietary intake of iron.  She has had iron infusions.  She would like a CBC rechecked as well as iron studies     - CBC with platelets; Future  - Iron and iron binding capacity; Future     Leukopenia, unspecified type  Unclear etiology.  She denies Mediterranean ethnicity.  No prior significant hematological disorders that she is aware of.     Her WBC has waxed and waned over the last couple of years, sometimes normal, sometimes slightly low.     This has brought about some anxiety for her.  She wants the WBC rechecked today.  We talked about having a conversation with hematology on whether she needs to have any additional workup.  She will take a hematology consult under consideration     Latest Reference Range & Units 03/19/24 07:50 04/16/24 08:51 09/26/24 14:13 09/27/24 08:45 01/31/25 08:37 02/27/25 08:47 04/22/25 08:46   Ferritin 6 - 175 ng/mL    7 115     Iron 37 - 145 ug/dL    30 (L) 94     Iron Binding Capacity 240 - 430 ug/dL    406 242     Iron Sat Index 15 - 46 %    7 (L) 39     WBC 4.0 - 11.0 10e3/uL 3.1 (L) 2.7 (L) 4.1 3.8 (L) 2.3 (L) 3.1 (L) 3.7 (L)   Hemoglobin 11.7 - 15.7 g/dL 11.9 11.6 (L) 10.7 (L) 11.6 (L) 14.2 13.7 14.9   Hematocrit 35.0 - 47.0 % 38.8 37.7 35.0 37.6 43.5 41.1 43.1   Platelet Count 150 - 450 10e3/uL 223 246 251 250 188 187 196   RBC Count 3.80 - 5.20 10e6/uL 4.73 4.66 4.51 4.86 5.08 4.67 4.73   MCV 78 - 100  fL 82 81 78 77 (L) 86 88 91   MCH 26.5 - 33.0 pg 25.2 (L) 24.9 (L) 23.7 (L) 23.9 (L) 28.0 29.3 31.5   MCHC 31.5 - 36.5 g/dL 30.7 (L) 30.8 (L) 30.6 (L) 30.9 (L) 32.6 33.3 34.6   RDW 10.0 - 15.0 % 12.7 13.9 15.1 (H) 15.1 (H) 17.8 (H) 15.5 (H) 13.3   (L): Data is abnormally low  (H): Data is abnormally high    Records Location: [facility]     Additional testing needed prior to consult:     ?    Referral updates and Plan:     04/22/2025 11:28 AM -  Referral received and reviewed. Sent to NPS to schedule next available.    Mariangel Coreas, AMRITN, RN  Hematology/Oncology Nurse Navigator  [facility] [place]  [phone] / [phone]

## 2025-04-22 NOTE — PATIENT INSTRUCTIONS
I placed lab orders so that you could have a WBC and hemoglobin rechecked, as well as iron studies.    It is unclear why your WBC has been intermittently low.  Sometimes, we are not able to identify a cause and otherwise healthy individuals.    To help krysten some anxiety, it may be a good idea to have a conversation with hematology on whether you need any additional workup.  I placed a referral for you today.

## 2025-04-23 LAB
IRON BINDING CAPACITY (ROCHE): 237 UG/DL (ref 240–430)
IRON SATN MFR SERPL: 24 % (ref 15–46)
IRON SERPL-MCNC: 57 UG/DL (ref 37–145)

## 2025-04-25 ENCOUNTER — MYC MEDICAL ADVICE (OUTPATIENT)
Dept: INTERNAL MEDICINE | Facility: CLINIC | Age: 35
End: 2025-04-25
Payer: COMMERCIAL

## 2025-04-28 ENCOUNTER — TELEPHONE (OUTPATIENT)
Dept: FAMILY MEDICINE | Facility: CLINIC | Age: 35
End: 2025-04-28

## 2025-04-28 ENCOUNTER — MYC MEDICAL ADVICE (OUTPATIENT)
Dept: FAMILY MEDICINE | Facility: CLINIC | Age: 35
End: 2025-04-28

## 2025-04-28 NOTE — TELEPHONE ENCOUNTER
Outgoing call to patient.     Patient said she has been having this pain for a year, has follow up with primary care provider tomorrow.     Advised that if pain is un tolerable before appointment than a urgent care would be the best place to go tonight. She said that she should be okay until visit tomorrow.     Whitney GLASER RN

## 2025-04-28 NOTE — TELEPHONE ENCOUNTER
Left message to call back for: pt  Information to relay to patient: patient scheduled with KATHLEEN Crockett CNP tomorrow 4/29 at 0740. Patient's PCP has opening at 0830.. OK to switch times and providers for tomorrow's appointment? 0830 slot on [facility] schedule held for patient, OK to use.

## 2025-04-29 ENCOUNTER — OFFICE VISIT (OUTPATIENT)
Dept: FAMILY MEDICINE | Facility: CLINIC | Age: 35
End: 2025-04-29
Payer: COMMERCIAL

## 2025-04-29 ENCOUNTER — TELEPHONE (OUTPATIENT)
Dept: FAMILY MEDICINE | Facility: CLINIC | Age: 35
End: 2025-04-29

## 2025-04-29 VITALS
OXYGEN SATURATION: 97 % | WEIGHT: 134.8 LBS | DIASTOLIC BLOOD PRESSURE: 87 MMHG | RESPIRATION RATE: 16 BRPM | HEIGHT: 64 IN | SYSTOLIC BLOOD PRESSURE: 125 MMHG | BODY MASS INDEX: 23.01 KG/M2 | TEMPERATURE: 98.2 F | HEART RATE: 79 BPM

## 2025-04-29 DIAGNOSIS — Z97.5 IUD (INTRAUTERINE DEVICE) IN PLACE: ICD-10-CM

## 2025-04-29 DIAGNOSIS — B96.89 BV (BACTERIAL VAGINOSIS): ICD-10-CM

## 2025-04-29 DIAGNOSIS — D72.819 LEUKOPENIA, UNSPECIFIED TYPE: ICD-10-CM

## 2025-04-29 DIAGNOSIS — Z87.891 HISTORY OF SMOKING: ICD-10-CM

## 2025-04-29 DIAGNOSIS — N76.0 BV (BACTERIAL VAGINOSIS): ICD-10-CM

## 2025-04-29 DIAGNOSIS — R41.840 ATTENTION AND CONCENTRATION DEFICIT: ICD-10-CM

## 2025-04-29 DIAGNOSIS — D50.9 IRON DEFICIENCY ANEMIA, UNSPECIFIED IRON DEFICIENCY ANEMIA TYPE: ICD-10-CM

## 2025-04-29 DIAGNOSIS — F11.11 HISTORY OF OPIOID ABUSE (H): ICD-10-CM

## 2025-04-29 DIAGNOSIS — R10.30 LOWER ABDOMINAL PAIN: Primary | ICD-10-CM

## 2025-04-29 PROBLEM — F11.90 CHRONIC, CONTINUOUS USE OF OPIOIDS: Status: ACTIVE | Noted: 2018-02-08

## 2025-04-29 PROBLEM — M21.612 BUNION, LEFT: Status: RESOLVED | Noted: 2025-03-20 | Resolved: 2025-04-29

## 2025-04-29 LAB
ALBUMIN SERPL BCG-MCNC: 4.5 G/DL (ref 3.5–5.2)
ALBUMIN UR-MCNC: 30 MG/DL
ALP SERPL-CCNC: 59 U/L (ref 40–150)
ALT SERPL W P-5'-P-CCNC: 16 U/L (ref 0–50)
AMORPH CRY #/AREA URNS HPF: ABNORMAL /HPF
ANION GAP SERPL CALCULATED.3IONS-SCNC: 7 MMOL/L (ref 7–15)
APPEARANCE UR: ABNORMAL
AST SERPL W P-5'-P-CCNC: 22 U/L (ref 0–45)
BACTERIA #/AREA URNS HPF: ABNORMAL /HPF
BASOPHILS # BLD AUTO: 0 10E3/UL (ref 0–0.2)
BASOPHILS NFR BLD AUTO: 1 %
BILIRUB SERPL-MCNC: 0.6 MG/DL
BILIRUB UR QL STRIP: NEGATIVE
BUN SERPL-MCNC: 11 MG/DL (ref 6–20)
CALCIUM SERPL-MCNC: 9.3 MG/DL (ref 8.8–10.4)
CHLORIDE SERPL-SCNC: 100 MMOL/L (ref 98–107)
CLUE CELLS: PRESENT
COLOR UR AUTO: YELLOW
CREAT SERPL-MCNC: 0.56 MG/DL (ref 0.51–0.95)
EGFRCR SERPLBLD CKD-EPI 2021: >90 ML/MIN/1.73M2
EOSINOPHIL # BLD AUTO: 0.1 10E3/UL (ref 0–0.7)
EOSINOPHIL NFR BLD AUTO: 2 %
ERYTHROCYTE [DISTWIDTH] IN BLOOD BY AUTOMATED COUNT: 13.3 % (ref 10–15)
GLUCOSE SERPL-MCNC: 99 MG/DL (ref 70–99)
GLUCOSE UR STRIP-MCNC: NEGATIVE MG/DL
HCG UR QL: NEGATIVE
HCO3 SERPL-SCNC: 27 MMOL/L (ref 22–29)
HCT VFR BLD AUTO: 44 % (ref 35–47)
HGB BLD-MCNC: 14.6 G/DL (ref 11.7–15.7)
HGB UR QL STRIP: NEGATIVE
IMM GRANULOCYTES # BLD: 0 10E3/UL
IMM GRANULOCYTES NFR BLD: 0 %
KETONES UR STRIP-MCNC: NEGATIVE MG/DL
LEUKOCYTE ESTERASE UR QL STRIP: NEGATIVE
LIPASE SERPL-CCNC: 29 U/L (ref 13–60)
LYMPHOCYTES # BLD AUTO: 0.8 10E3/UL (ref 0.8–5.3)
LYMPHOCYTES NFR BLD AUTO: 25 %
MCH RBC QN AUTO: 30.9 PG (ref 26.5–33)
MCHC RBC AUTO-ENTMCNC: 33.2 G/DL (ref 31.5–36.5)
MCV RBC AUTO: 93 FL (ref 78–100)
MONOCYTES # BLD AUTO: 0.3 10E3/UL (ref 0–1.3)
MONOCYTES NFR BLD AUTO: 10 %
MUCOUS THREADS #/AREA URNS LPF: PRESENT /LPF
NEUTROPHILS # BLD AUTO: 2 10E3/UL (ref 1.6–8.3)
NEUTROPHILS NFR BLD AUTO: 62 %
NITRATE UR QL: NEGATIVE
PH UR STRIP: 8.5 [PH] (ref 5–8)
PLATELET # BLD AUTO: 200 10E3/UL (ref 150–450)
POTASSIUM SERPL-SCNC: 4.4 MMOL/L (ref 3.4–5.3)
PROT SERPL-MCNC: 7.1 G/DL (ref 6.4–8.3)
RBC # BLD AUTO: 4.73 10E6/UL (ref 3.8–5.2)
RBC #/AREA URNS AUTO: ABNORMAL /HPF
SODIUM SERPL-SCNC: 134 MMOL/L (ref 135–145)
SP GR UR STRIP: 1.02 (ref 1–1.03)
SQUAMOUS #/AREA URNS AUTO: ABNORMAL /LPF
TRICHOMONAS, WET PREP: ABNORMAL
UROBILINOGEN UR STRIP-ACNC: 2 E.U./DL
WBC # BLD AUTO: 3.2 10E3/UL (ref 4–11)
WBC #/AREA URNS AUTO: ABNORMAL /HPF
WBC'S/HIGH POWER FIELD, WET PREP: ABNORMAL
YEAST, WET PREP: ABNORMAL

## 2025-04-29 PROCEDURE — 99214 OFFICE O/P EST MOD 30 MIN: CPT | Performed by: PHYSICIAN ASSISTANT

## 2025-04-29 PROCEDURE — 3074F SYST BP LT 130 MM HG: CPT | Performed by: PHYSICIAN ASSISTANT

## 2025-04-29 PROCEDURE — 83690 ASSAY OF LIPASE: CPT | Performed by: PHYSICIAN ASSISTANT

## 2025-04-29 PROCEDURE — 87210 SMEAR WET MOUNT SALINE/INK: CPT | Performed by: PHYSICIAN ASSISTANT

## 2025-04-29 PROCEDURE — 85025 COMPLETE CBC W/AUTO DIFF WBC: CPT | Performed by: PHYSICIAN ASSISTANT

## 2025-04-29 PROCEDURE — 81025 URINE PREGNANCY TEST: CPT | Performed by: PHYSICIAN ASSISTANT

## 2025-04-29 PROCEDURE — 80053 COMPREHEN METABOLIC PANEL: CPT | Performed by: PHYSICIAN ASSISTANT

## 2025-04-29 PROCEDURE — G2211 COMPLEX E/M VISIT ADD ON: HCPCS | Performed by: PHYSICIAN ASSISTANT

## 2025-04-29 PROCEDURE — 36415 COLL VENOUS BLD VENIPUNCTURE: CPT | Performed by: PHYSICIAN ASSISTANT

## 2025-04-29 PROCEDURE — 3079F DIAST BP 80-89 MM HG: CPT | Performed by: PHYSICIAN ASSISTANT

## 2025-04-29 PROCEDURE — 81001 URINALYSIS AUTO W/SCOPE: CPT | Performed by: PHYSICIAN ASSISTANT

## 2025-04-29 RX ORDER — METRONIDAZOLE 500 MG/1
500 TABLET ORAL 2 TIMES DAILY
Qty: 14 TABLET | Refills: 0 | Status: SHIPPED | OUTPATIENT
Start: 2025-04-29 | End: 2025-05-06

## 2025-04-29 ASSESSMENT — PATIENT HEALTH QUESTIONNAIRE - PHQ9
SUM OF ALL RESPONSES TO PHQ QUESTIONS 1-9: 3
10. IF YOU CHECKED OFF ANY PROBLEMS, HOW DIFFICULT HAVE THESE PROBLEMS MADE IT FOR YOU TO DO YOUR WORK, TAKE CARE OF THINGS AT HOME, OR GET ALONG WITH OTHER PEOPLE: SOMEWHAT DIFFICULT
SUM OF ALL RESPONSES TO PHQ QUESTIONS 1-9: 3

## 2025-04-29 NOTE — ASSESSMENT & PLAN NOTE
She has been experiencing lower abdominal pain with radiation to her low back for the past 3 weeks.  She does have a history of chronic back pain.  Her last bowel movement was 2 days ago.  She has a history of constipation.  She states that she does not feel bloated or that she is having problems with constipation currently.  No melena or hematochezia.  No nausea or vomiting.  Denies hematuria.  Reports that her urine is cloudy.  She has had some urinary frequency and urgency but this is not new.  She has an IUD in place.  No new sexual partners.  She has a history of herpes but does not currently have an outbreak.  No history of abdominal surgeries.  No fevers.    Urinalysis is negative for nitrates or leukocytes esterase.  Elevated protein and urobilinogen.  Negative urine pregnancy test.  Will proceed with additional evaluation with repeat CBC, CMP, lipase, wet prep and pelvic ultrasound.  In the meantime, she is going to do a bowel cleanse as this may be contributing to her symptoms.  If she develops worsening symptoms she was instructed to go to the ER.

## 2025-04-29 NOTE — ASSESSMENT & PLAN NOTE
"Lab Results   Component Value Date    WBC 3.7 04/22/2025     Lab Results   Component Value Date    RBC 4.73 04/22/2025     Lab Results   Component Value Date    HGB 14.9 04/22/2025     Lab Results   Component Value Date    HCT 43.1 04/22/2025     No components found for: \"MCT\"  Lab Results   Component Value Date    MCV 91 04/22/2025     Lab Results   Component Value Date    MCH 31.5 04/22/2025     Lab Results   Component Value Date    MCHC 34.6 04/22/2025     Lab Results   Component Value Date    RDW 13.3 04/22/2025     Lab Results   Component Value Date     04/22/2025     Continues to have leukopenia.  She was recenelty referred to Hematology.  Will await recommendations.    "

## 2025-04-29 NOTE — PROGRESS NOTES
"The longitudinal plan of care for the diagnosis(es)/condition(s) as documented were addressed during this visit. Due to the added complexity in care, I will continue to support Savi in the subsequent management and with ongoing continuity of care.    Assessment & Plan   Problem List Items Addressed This Visit       Attention and concentration deficit     Currently on Vyvanse.  She is followed by the [facility].         History of opioid abuse (H)     Currently on Suboxone.  Follows [facility].         History of smoking     Quit smoking May 2024.         Anemia, iron deficiency     Lab Results   Component Value Date    WBC 3.7 04/22/2025     Lab Results   Component Value Date    RBC 4.73 04/22/2025     Lab Results   Component Value Date    HGB 14.9 04/22/2025     Lab Results   Component Value Date    HCT 43.1 04/22/2025     No components found for: \"MCT\"  Lab Results   Component Value Date    MCV 91 04/22/2025     Lab Results   Component Value Date    MCH 31.5 04/22/2025     Lab Results   Component Value Date    MCHC 34.6 04/22/2025     Lab Results   Component Value Date    RDW 13.3 04/22/2025     Lab Results   Component Value Date     04/22/2025      Recent CBC shows normal hemoglobin.  She has an IUD in place.  CBC January 25, 2025 demonstrated hemoglobin 13.2. Ferritin 115. Normal iron studies. The cause of her iron deficiency anemia is likely a component of dietary preferences and very restrictive diet. IUD in place so she has a scant amount of bleeding. She has had a EGD and colonoscopy completed in 2024. EGD demonstrated reactive gastropathy but no atrophy. Negative for H. pylori. Colonoscopy in December 2024 demonstrated no polyps.          IUD (intrauterine device) in place    Lower abdominal pain - Primary     She has been experiencing lower abdominal pain with radiation to her low back for the past 3 weeks.  She does have a history of chronic back pain.  Her last bowel movement was 2 days " "ago.  She has a history of constipation.  She states that she does not feel bloated or that she is having problems with constipation currently.  No melena or hematochezia.  No nausea or vomiting.  Denies hematuria.  Reports that her urine is cloudy.  She has had some urinary frequency and urgency but this is not new.  She has an IUD in place.  No new sexual partners.  She has a history of herpes but does not currently have an outbreak.  No history of abdominal surgeries.  No fevers.    Urinalysis is negative for nitrates or leukocytes esterase.  Elevated protein and urobilinogen.  Negative urine pregnancy test.  Will proceed with additional evaluation with repeat CBC, CMP, lipase, wet prep and pelvic ultrasound.  In the meantime, she is going to do a bowel cleanse as this may be contributing to her symptoms.  If she develops worsening symptoms she was instructed to go to the ER.         Relevant Orders    Comprehensive metabolic panel (BMP + Alb, Alk Phos, ALT, AST, Total. Bili, TP)    HCG qualitative urine (Completed)    CBC with platelets and differential (Completed)    Lipase    UA with Microscopic reflex to Culture - Clinic Collect (Completed)    Wet prep - lab collect (Completed)    UA Microscopic with Reflex to Culture (Completed)    US Pelvic Complete with Transvaginal    Leukopenia     Lab Results   Component Value Date    WBC 3.7 04/22/2025     Lab Results   Component Value Date    RBC 4.73 04/22/2025     Lab Results   Component Value Date    HGB 14.9 04/22/2025     Lab Results   Component Value Date    HCT 43.1 04/22/2025     No components found for: \"MCT\"  Lab Results   Component Value Date    MCV 91 04/22/2025     Lab Results   Component Value Date    MCH 31.5 04/22/2025     Lab Results   Component Value Date    MCHC 34.6 04/22/2025     Lab Results   Component Value Date    RDW 13.3 04/22/2025     Lab Results   Component Value Date     04/22/2025     Continues to have leukopenia.  She was " "recenelty referred to Hematology.  Will await recommendations.            Other Visit Diagnoses       BV (bacterial vaginosis)        Relevant Medications    metroNIDAZOLE (FLAGYL) 500 MG tablet                 Subjective   Savi is a 35 year old, presenting for the following health issues:  Symptoms (pelvic pain radiating to lower back x 3 wks. )        4/29/2025     8:36 AM   Additional Questions   Roomed by Gregor Cuellar MA   Accompanied by Self         4/29/2025     8:36 AM   Patient Reported Additional Medications   Patient reports taking the following new medications None     History of Present Illness       Reason for visit:  Worse pain in the front and back    She eats 0-1 servings of fruits and vegetables daily.She consumes 2 sweetened beverage(s) daily.She exercises with enough effort to increase her heart rate 30 to 60 minutes per day.  She exercises with enough effort to increase her heart rate 3 or less days per week.   She is taking medications regularly.              Objective    /87 (BP Location: Left arm, Patient Position: Sitting, Cuff Size: Adult Regular)   Pulse 79   Temp 98.2  F (36.8  C) (Oral)   Resp 16   Ht 1.626 m (5' 4\")   Wt 61.1 kg (134 lb 12.8 oz)   LMP  (LMP Unknown)   SpO2 97%   BMI 23.14 kg/m    Body mass index is 23.14 kg/m .  Physical Exam  Vitals and nursing note reviewed.   Constitutional:       Appearance: Normal appearance.   HENT:      Head: Normocephalic.   Cardiovascular:      Rate and Rhythm: Normal rate and regular rhythm.      Heart sounds: Normal heart sounds.   Pulmonary:      Effort: Pulmonary effort is normal.      Breath sounds: Normal breath sounds.   Abdominal:      General: Abdomen is flat. Bowel sounds are normal.      Palpations: Abdomen is soft.   Neurological:      Mental Status: She is alert.                Signed Electronically by: Alexandra Bustillos PA-C    "

## 2025-04-29 NOTE — ASSESSMENT & PLAN NOTE
"Lab Results   Component Value Date    WBC 3.7 04/22/2025     Lab Results   Component Value Date    RBC 4.73 04/22/2025     Lab Results   Component Value Date    HGB 14.9 04/22/2025     Lab Results   Component Value Date    HCT 43.1 04/22/2025     No components found for: \"MCT\"  Lab Results   Component Value Date    MCV 91 04/22/2025     Lab Results   Component Value Date    MCH 31.5 04/22/2025     Lab Results   Component Value Date    MCHC 34.6 04/22/2025     Lab Results   Component Value Date    RDW 13.3 04/22/2025     Lab Results   Component Value Date     04/22/2025      Recent CBC shows normal hemoglobin.  She has an IUD in place.  CBC January 25, 2025 demonstrated hemoglobin 13.2. Ferritin 115. Normal iron studies. The cause of her iron deficiency anemia is likely a component of dietary preferences and very restrictive diet. IUD in place so she has a scant amount of bleeding. She has had a EGD and colonoscopy completed in 2024. EGD demonstrated reactive gastropathy but no atrophy. Negative for H. pylori. Colonoscopy in December 2024 demonstrated no polyps.   "

## 2025-04-29 NOTE — TELEPHONE ENCOUNTER
Called Savi and relayed message as written.  Pharmacy confirmed.  No other questions at this time.

## 2025-04-29 NOTE — TELEPHONE ENCOUNTER
----- Message from Alexandra Bustillos sent at 4/29/2025 10:01 AM CDT -----  Please let patient know that repeat CBC continues to show leukopenia which is a low white count.  She should set up her appointment with hematology as we discussed.    The wet prep shows clue cells.  Will treat bacterial vaginosis with Flagyl twice daily for 7 days.  She should avoid alcohol while taking this medication.  Will await remaining tests including pelvic ultrasound at this time.  Thank you.

## 2025-05-13 ENCOUNTER — MYC REFILL (OUTPATIENT)
Dept: GASTROENTEROLOGY | Facility: CLINIC | Age: 35
End: 2025-05-13
Payer: COMMERCIAL

## 2025-05-13 DIAGNOSIS — R10.13 EPIGASTRIC PAIN: ICD-10-CM

## 2025-05-13 DIAGNOSIS — R12 HEARTBURN: ICD-10-CM

## 2025-05-15 RX ORDER — ESOMEPRAZOLE MAGNESIUM 40 MG/1
40 CAPSULE, DELAYED RELEASE ORAL 2 TIMES DAILY
Qty: 90 CAPSULE | Refills: 1 | Status: SHIPPED | OUTPATIENT
Start: 2025-05-15

## 2025-05-15 NOTE — TELEPHONE ENCOUNTER
Last filled 4/6/25 for 45 days supply. No additional refills on file. Per MyChart encounter 1/17/25, patient to continue esomeprazole 40mg BID. Refill placed appropriately. Requested patient schedule follow-up in clinic with Yue Ramirez; due August 2025.

## 2025-05-21 ENCOUNTER — MYC MEDICAL ADVICE (OUTPATIENT)
Dept: FAMILY MEDICINE | Facility: CLINIC | Age: 35
End: 2025-05-21
Payer: COMMERCIAL

## 2025-05-21 DIAGNOSIS — K59.00 CONSTIPATION, UNSPECIFIED CONSTIPATION TYPE: Primary | ICD-10-CM

## 2025-05-22 RX ORDER — POLYETHYLENE GLYCOL 3350 17 G/17G
1 POWDER, FOR SOLUTION ORAL DAILY
Qty: 238 G | Refills: 0 | Status: SHIPPED | OUTPATIENT
Start: 2025-05-22

## 2025-05-28 PROBLEM — L98.9 SKIN LESION: Status: ACTIVE | Noted: 2025-05-28

## 2025-06-10 ENCOUNTER — RESULTS FOLLOW-UP (OUTPATIENT)
Dept: FAMILY MEDICINE | Facility: CLINIC | Age: 35
End: 2025-06-10

## 2025-06-16 ENCOUNTER — LAB (OUTPATIENT)
Dept: LAB | Facility: CLINIC | Age: 35
End: 2025-06-16
Payer: COMMERCIAL

## 2025-06-16 ENCOUNTER — RESULTS FOLLOW-UP (OUTPATIENT)
Dept: GASTROENTEROLOGY | Facility: CLINIC | Age: 35
End: 2025-06-16

## 2025-06-16 DIAGNOSIS — E61.1 LOW IRON: ICD-10-CM

## 2025-06-16 LAB
FERRITIN SERPL-MCNC: 65 NG/ML (ref 6–175)
HCT VFR BLD AUTO: 40 % (ref 35–47)
HGB BLD-MCNC: 14 G/DL (ref 11.7–15.7)
IRON BINDING CAPACITY (ROCHE): 244 UG/DL (ref 240–430)
IRON SATN MFR SERPL: 23 % (ref 15–46)
IRON SERPL-MCNC: 56 UG/DL (ref 37–145)
MCV RBC AUTO: 93 FL (ref 78–100)

## 2025-06-16 PROCEDURE — 85018 HEMOGLOBIN: CPT

## 2025-06-16 PROCEDURE — 83550 IRON BINDING TEST: CPT

## 2025-06-16 PROCEDURE — 36415 COLL VENOUS BLD VENIPUNCTURE: CPT

## 2025-06-16 PROCEDURE — 85014 HEMATOCRIT: CPT

## 2025-06-16 PROCEDURE — 82728 ASSAY OF FERRITIN: CPT

## 2025-06-16 PROCEDURE — 83540 ASSAY OF IRON: CPT

## 2025-06-19 NOTE — TELEPHONE ENCOUNTER
To address in an appt, asked RN to assist with scheduling in next 2 weeks.   She had multiple [employer] messages/questions.   SY

## 2025-07-15 PROBLEM — Q38.1 TONGUE TIED: Status: ACTIVE | Noted: 2025-07-15

## 2025-08-18 ENCOUNTER — MYC REFILL (OUTPATIENT)
Dept: FAMILY MEDICINE | Facility: CLINIC | Age: 35
End: 2025-08-18
Payer: COMMERCIAL

## 2025-08-18 DIAGNOSIS — N94.9 GENITAL LESION, FEMALE: ICD-10-CM

## 2025-08-18 DIAGNOSIS — B00.9 RECURRENT HERPES SIMPLEX: ICD-10-CM

## 2025-08-18 RX ORDER — ACYCLOVIR 400 MG/1
400 TABLET ORAL EVERY 8 HOURS
Qty: 60 TABLET | Refills: 0 | Status: SHIPPED | OUTPATIENT
Start: 2025-08-18

## 2025-09-04 ENCOUNTER — TELEPHONE (OUTPATIENT)
Dept: PHARMACY | Facility: OTHER | Age: 35
End: 2025-09-04
Payer: COMMERCIAL